# Patient Record
Sex: MALE | Race: WHITE | NOT HISPANIC OR LATINO
[De-identification: names, ages, dates, MRNs, and addresses within clinical notes are randomized per-mention and may not be internally consistent; named-entity substitution may affect disease eponyms.]

---

## 2018-01-12 ENCOUNTER — MOBILE ON CALL (OUTPATIENT)
Age: 68
End: 2018-01-12

## 2018-01-17 ENCOUNTER — APPOINTMENT (OUTPATIENT)
Dept: PULMONOLOGY | Facility: CLINIC | Age: 68
End: 2018-01-17
Payer: MEDICARE

## 2018-01-17 VITALS
WEIGHT: 135 LBS | BODY MASS INDEX: 19.99 KG/M2 | OXYGEN SATURATION: 98 % | DIASTOLIC BLOOD PRESSURE: 60 MMHG | HEART RATE: 78 BPM | SYSTOLIC BLOOD PRESSURE: 118 MMHG | HEIGHT: 69 IN

## 2018-01-17 DIAGNOSIS — G89.29 DORSALGIA, UNSPECIFIED: ICD-10-CM

## 2018-01-17 DIAGNOSIS — G47.30 SLEEP APNEA, UNSPECIFIED: ICD-10-CM

## 2018-01-17 DIAGNOSIS — M54.9 DORSALGIA, UNSPECIFIED: ICD-10-CM

## 2018-01-17 DIAGNOSIS — J32.9 CHRONIC SINUSITIS, UNSPECIFIED: ICD-10-CM

## 2018-01-17 PROCEDURE — 99202 OFFICE O/P NEW SF 15 MIN: CPT

## 2018-01-17 RX ORDER — TAMSULOSIN HYDROCHLORIDE 0.4 MG/1
0.4 CAPSULE ORAL
Refills: 0 | Status: ACTIVE | COMMUNITY

## 2018-01-17 RX ORDER — ASCORBIC ACID 500 MG
500 TABLET ORAL
Refills: 0 | Status: ACTIVE | COMMUNITY

## 2018-01-17 RX ORDER — MIRABEGRON 25 MG/1
25 TABLET, FILM COATED, EXTENDED RELEASE ORAL
Refills: 0 | Status: ACTIVE | COMMUNITY

## 2018-01-17 RX ORDER — MORPHINE SULFATE 30 MG/1
30 TABLET, FILM COATED, EXTENDED RELEASE ORAL
Refills: 0 | Status: ACTIVE | COMMUNITY

## 2018-01-17 RX ORDER — OMEPRAZOLE 20 MG/1
TABLET, DELAYED RELEASE ORAL
Refills: 0 | Status: ACTIVE | COMMUNITY

## 2018-01-17 RX ORDER — CALCITONIN SALMON 200 [IU]/1
200 SPRAY, METERED NASAL
Refills: 0 | Status: ACTIVE | COMMUNITY

## 2018-04-18 ENCOUNTER — APPOINTMENT (OUTPATIENT)
Dept: UROLOGY | Facility: CLINIC | Age: 68
End: 2018-04-18
Payer: MEDICARE

## 2018-04-18 VITALS
HEART RATE: 88 BPM | HEIGHT: 69 IN | SYSTOLIC BLOOD PRESSURE: 105 MMHG | BODY MASS INDEX: 19.26 KG/M2 | DIASTOLIC BLOOD PRESSURE: 69 MMHG | WEIGHT: 130 LBS | TEMPERATURE: 98.1 F

## 2018-04-18 PROCEDURE — 99204 OFFICE O/P NEW MOD 45 MIN: CPT

## 2018-05-04 ENCOUNTER — APPOINTMENT (OUTPATIENT)
Dept: UROLOGY | Facility: CLINIC | Age: 68
End: 2018-05-04
Payer: MEDICARE

## 2018-05-04 VITALS
HEIGHT: 69 IN | BODY MASS INDEX: 19.26 KG/M2 | DIASTOLIC BLOOD PRESSURE: 75 MMHG | WEIGHT: 130 LBS | SYSTOLIC BLOOD PRESSURE: 125 MMHG

## 2018-05-04 LAB
BILIRUB UR QL STRIP: NEGATIVE
GLUCOSE UR-MCNC: NEGATIVE
HCG UR QL: 0.2 EU/DL
HGB UR QL STRIP.AUTO: NEGATIVE
KETONES UR-MCNC: NEGATIVE
LEUKOCYTE ESTERASE UR QL STRIP: NORMAL
NITRITE UR QL STRIP: NEGATIVE
PH UR STRIP: 6
PROT UR STRIP-MCNC: NEGATIVE
SP GR UR STRIP: 1.02

## 2018-05-04 PROCEDURE — 76857 US EXAM PELVIC LIMITED: CPT

## 2018-05-04 PROCEDURE — 81003 URINALYSIS AUTO W/O SCOPE: CPT | Mod: QW

## 2018-05-04 PROCEDURE — 51741 ELECTRO-UROFLOWMETRY FIRST: CPT

## 2018-05-04 PROCEDURE — 76872 US TRANSRECTAL: CPT

## 2018-05-18 ENCOUNTER — APPOINTMENT (OUTPATIENT)
Dept: UROLOGY | Facility: CLINIC | Age: 68
End: 2018-05-18
Payer: MEDICARE

## 2018-05-18 PROCEDURE — 51784 ANAL/URINARY MUSCLE STUDY: CPT

## 2018-05-18 PROCEDURE — 51728 CYSTOMETROGRAM W/VP: CPT

## 2018-05-18 PROCEDURE — 51798 US URINE CAPACITY MEASURE: CPT | Mod: 59

## 2018-05-18 PROCEDURE — 51741 ELECTRO-UROFLOWMETRY FIRST: CPT

## 2018-05-18 PROCEDURE — 51797 INTRAABDOMINAL PRESSURE TEST: CPT

## 2018-06-15 ENCOUNTER — APPOINTMENT (OUTPATIENT)
Dept: UROLOGY | Facility: CLINIC | Age: 68
End: 2018-06-15
Payer: MEDICARE

## 2018-06-15 VITALS — DIASTOLIC BLOOD PRESSURE: 65 MMHG | SYSTOLIC BLOOD PRESSURE: 96 MMHG

## 2018-06-15 PROCEDURE — 52281 CYSTOSCOPY AND TREATMENT: CPT

## 2018-07-10 ENCOUNTER — APPOINTMENT (OUTPATIENT)
Dept: UROLOGY | Facility: CLINIC | Age: 68
End: 2018-07-10
Payer: MEDICARE

## 2018-07-10 DIAGNOSIS — N32.81 OVERACTIVE BLADDER: ICD-10-CM

## 2018-07-10 PROCEDURE — 99213 OFFICE O/P EST LOW 20 MIN: CPT

## 2018-07-17 PROBLEM — N32.81 OVERACTIVE BLADDER: Status: ACTIVE | Noted: 2018-01-17

## 2018-08-20 ENCOUNTER — MEDICATION RENEWAL (OUTPATIENT)
Age: 68
End: 2018-08-20

## 2018-08-20 DIAGNOSIS — N40.1 BENIGN PROSTATIC HYPERPLASIA WITH LOWER URINARY TRACT SYMPMS: ICD-10-CM

## 2018-08-20 DIAGNOSIS — N13.8 BENIGN PROSTATIC HYPERPLASIA WITH LOWER URINARY TRACT SYMPMS: ICD-10-CM

## 2018-08-20 RX ORDER — FESOTERODINE FUMARATE 8 MG/1
8 TABLET, FILM COATED, EXTENDED RELEASE ORAL
Qty: 7 | Refills: 0 | Status: COMPLETED | OUTPATIENT
Start: 2018-05-04 | End: 2018-08-20

## 2018-08-20 RX ORDER — SILODOSIN 8 MG/1
8 CAPSULE ORAL
Qty: 7 | Refills: 0 | Status: COMPLETED | OUTPATIENT
Start: 2018-04-18 | End: 2018-04-25

## 2018-08-20 RX ORDER — CIPROFLOXACIN HYDROCHLORIDE 500 MG/1
500 TABLET, FILM COATED ORAL
Qty: 20 | Refills: 0 | Status: COMPLETED | COMMUNITY
Start: 2018-04-18 | End: 2018-04-28

## 2018-08-20 RX ORDER — TERAZOSIN 5 MG/1
5 CAPSULE ORAL
Qty: 7 | Refills: 0 | Status: COMPLETED | OUTPATIENT
Start: 2018-04-18 | End: 2018-04-25

## 2020-02-28 ENCOUNTER — TRANSCRIPTION ENCOUNTER (OUTPATIENT)
Age: 70
End: 2020-02-28

## 2020-02-28 ENCOUNTER — INPATIENT (INPATIENT)
Facility: HOSPITAL | Age: 70
LOS: 10 days | Discharge: NURSING, REHAB OR LONG-TERM CARE | DRG: 481 | End: 2020-03-10
Attending: HOSPITALIST | Admitting: HOSPITALIST
Payer: MEDICARE

## 2020-02-28 VITALS
DIASTOLIC BLOOD PRESSURE: 67 MMHG | OXYGEN SATURATION: 94 % | RESPIRATION RATE: 17 BRPM | WEIGHT: 130.07 LBS | TEMPERATURE: 99 F | HEART RATE: 78 BPM | SYSTOLIC BLOOD PRESSURE: 131 MMHG | HEIGHT: 69 IN

## 2020-02-28 DIAGNOSIS — S72.002A FRACTURE OF UNSPECIFIED PART OF NECK OF LEFT FEMUR, INITIAL ENCOUNTER FOR CLOSED FRACTURE: ICD-10-CM

## 2020-02-28 DIAGNOSIS — Z98.890 OTHER SPECIFIED POSTPROCEDURAL STATES: Chronic | ICD-10-CM

## 2020-02-28 LAB
ALBUMIN SERPL ELPH-MCNC: 3.9 G/DL — SIGNIFICANT CHANGE UP (ref 3.3–5)
ALP SERPL-CCNC: 136 U/L — HIGH (ref 40–120)
ALT FLD-CCNC: 22 U/L — SIGNIFICANT CHANGE UP (ref 10–45)
ANION GAP SERPL CALC-SCNC: 2 MMOL/L — LOW (ref 5–17)
APTT BLD: 32.1 SEC — SIGNIFICANT CHANGE UP (ref 27.5–36.3)
AST SERPL-CCNC: 16 U/L — SIGNIFICANT CHANGE UP (ref 10–40)
BASOPHILS # BLD AUTO: 0.03 K/UL — SIGNIFICANT CHANGE UP (ref 0–0.2)
BASOPHILS NFR BLD AUTO: 0.3 % — SIGNIFICANT CHANGE UP (ref 0–2)
BILIRUB SERPL-MCNC: 0.5 MG/DL — SIGNIFICANT CHANGE UP (ref 0.2–1.2)
BLD GP AB SCN SERPL QL: SIGNIFICANT CHANGE UP
BUN SERPL-MCNC: 15 MG/DL — SIGNIFICANT CHANGE UP (ref 7–23)
CALCIUM SERPL-MCNC: 9.7 MG/DL — SIGNIFICANT CHANGE UP (ref 8.4–10.5)
CHLORIDE SERPL-SCNC: 104 MMOL/L — SIGNIFICANT CHANGE UP (ref 96–108)
CO2 SERPL-SCNC: 31 MMOL/L — SIGNIFICANT CHANGE UP (ref 22–31)
CREAT SERPL-MCNC: 0.64 MG/DL — SIGNIFICANT CHANGE UP (ref 0.5–1.3)
EOSINOPHIL # BLD AUTO: 0.04 K/UL — SIGNIFICANT CHANGE UP (ref 0–0.5)
EOSINOPHIL NFR BLD AUTO: 0.3 % — SIGNIFICANT CHANGE UP (ref 0–6)
GLUCOSE SERPL-MCNC: 118 MG/DL — HIGH (ref 70–99)
HCT VFR BLD CALC: 41.2 % — SIGNIFICANT CHANGE UP (ref 39–50)
HGB BLD-MCNC: 13.5 G/DL — SIGNIFICANT CHANGE UP (ref 13–17)
IMM GRANULOCYTES NFR BLD AUTO: 0.3 % — SIGNIFICANT CHANGE UP (ref 0–1.5)
INR BLD: 1.27 RATIO — HIGH (ref 0.88–1.16)
LYMPHOCYTES # BLD AUTO: 1.02 K/UL — SIGNIFICANT CHANGE UP (ref 1–3.3)
LYMPHOCYTES # BLD AUTO: 8.6 % — LOW (ref 13–44)
MCHC RBC-ENTMCNC: 31.9 PG — SIGNIFICANT CHANGE UP (ref 27–34)
MCHC RBC-ENTMCNC: 32.8 GM/DL — SIGNIFICANT CHANGE UP (ref 32–36)
MCV RBC AUTO: 97.4 FL — SIGNIFICANT CHANGE UP (ref 80–100)
MONOCYTES # BLD AUTO: 1.09 K/UL — HIGH (ref 0–0.9)
MONOCYTES NFR BLD AUTO: 9.2 % — SIGNIFICANT CHANGE UP (ref 2–14)
NEUTROPHILS # BLD AUTO: 9.69 K/UL — HIGH (ref 1.8–7.4)
NEUTROPHILS NFR BLD AUTO: 81.3 % — HIGH (ref 43–77)
NRBC # BLD: 0 /100 WBCS — SIGNIFICANT CHANGE UP (ref 0–0)
PLATELET # BLD AUTO: 220 K/UL — SIGNIFICANT CHANGE UP (ref 150–400)
POTASSIUM SERPL-MCNC: 4.4 MMOL/L — SIGNIFICANT CHANGE UP (ref 3.5–5.3)
POTASSIUM SERPL-SCNC: 4.4 MMOL/L — SIGNIFICANT CHANGE UP (ref 3.5–5.3)
PROT SERPL-MCNC: 8.2 G/DL — SIGNIFICANT CHANGE UP (ref 6–8.3)
PROTHROM AB SERPL-ACNC: 14.3 SEC — HIGH (ref 10–12.9)
RBC # BLD: 4.23 M/UL — SIGNIFICANT CHANGE UP (ref 4.2–5.8)
RBC # FLD: 13.3 % — SIGNIFICANT CHANGE UP (ref 10.3–14.5)
SODIUM SERPL-SCNC: 137 MMOL/L — SIGNIFICANT CHANGE UP (ref 135–145)
WBC # BLD: 11.91 K/UL — HIGH (ref 3.8–10.5)
WBC # FLD AUTO: 11.91 K/UL — HIGH (ref 3.8–10.5)

## 2020-02-28 PROCEDURE — 73502 X-RAY EXAM HIP UNI 2-3 VIEWS: CPT | Mod: 26,LT

## 2020-02-28 PROCEDURE — 93010 ELECTROCARDIOGRAM REPORT: CPT

## 2020-02-28 PROCEDURE — 99285 EMERGENCY DEPT VISIT HI MDM: CPT

## 2020-02-28 PROCEDURE — 71045 X-RAY EXAM CHEST 1 VIEW: CPT | Mod: 26

## 2020-02-28 RX ORDER — FINASTERIDE 5 MG/1
1 TABLET, FILM COATED ORAL
Qty: 0 | Refills: 0 | DISCHARGE

## 2020-02-28 RX ORDER — LATANOPROST 0.05 MG/ML
1 SOLUTION/ DROPS OPHTHALMIC; TOPICAL
Qty: 0 | Refills: 0 | DISCHARGE

## 2020-02-28 RX ORDER — TAMSULOSIN HYDROCHLORIDE 0.4 MG/1
0.8 CAPSULE ORAL AT BEDTIME
Refills: 0 | Status: DISCONTINUED | OUTPATIENT
Start: 2020-02-28 | End: 2020-02-29

## 2020-02-28 RX ORDER — MORPHINE SULFATE 50 MG/1
100 CAPSULE, EXTENDED RELEASE ORAL
Qty: 0 | Refills: 0 | DISCHARGE

## 2020-02-28 RX ORDER — ALENDRONATE SODIUM 70 MG/1
1 TABLET ORAL
Qty: 0 | Refills: 0 | DISCHARGE

## 2020-02-28 RX ORDER — LIDOCAINE 4 G/100G
0 CREAM TOPICAL
Qty: 0 | Refills: 0 | DISCHARGE

## 2020-02-28 RX ORDER — RANITIDINE HYDROCHLORIDE 150 MG/1
1 TABLET, FILM COATED ORAL
Qty: 0 | Refills: 0 | DISCHARGE

## 2020-02-28 RX ORDER — LANOLIN ALCOHOL/MO/W.PET/CERES
2 CREAM (GRAM) TOPICAL
Qty: 0 | Refills: 0 | DISCHARGE

## 2020-02-28 RX ORDER — CHLORHEXIDINE GLUCONATE 213 G/1000ML
1 SOLUTION TOPICAL ONCE
Refills: 0 | Status: COMPLETED | OUTPATIENT
Start: 2020-02-29 | End: 2020-02-29

## 2020-02-28 RX ORDER — MIRABEGRON 50 MG/1
1 TABLET, EXTENDED RELEASE ORAL
Qty: 0 | Refills: 0 | DISCHARGE

## 2020-02-28 RX ORDER — MORPHINE SULFATE 50 MG/1
100 CAPSULE, EXTENDED RELEASE ORAL AT BEDTIME
Refills: 0 | Status: DISCONTINUED | OUTPATIENT
Start: 2020-02-28 | End: 2020-02-29

## 2020-02-28 RX ORDER — MORPHINE SULFATE 50 MG/1
0 CAPSULE, EXTENDED RELEASE ORAL
Qty: 0 | Refills: 0 | DISCHARGE

## 2020-02-28 RX ORDER — FINASTERIDE 5 MG/1
5 TABLET, FILM COATED ORAL DAILY
Refills: 0 | Status: DISCONTINUED | OUTPATIENT
Start: 2020-02-28 | End: 2020-02-29

## 2020-02-28 RX ORDER — SODIUM CHLORIDE 0.65 %
2 AEROSOL, SPRAY (ML) NASAL
Qty: 0 | Refills: 0 | DISCHARGE

## 2020-02-28 RX ORDER — ASCORBIC ACID 60 MG
500 TABLET,CHEWABLE ORAL DAILY
Refills: 0 | Status: DISCONTINUED | OUTPATIENT
Start: 2020-02-28 | End: 2020-02-29

## 2020-02-28 RX ORDER — LACTULOSE 10 G/15ML
10 SOLUTION ORAL DAILY
Refills: 0 | Status: DISCONTINUED | OUTPATIENT
Start: 2020-02-28 | End: 2020-02-29

## 2020-02-28 RX ORDER — ERGOCALCIFEROL 1.25 MG/1
2 CAPSULE ORAL
Qty: 0 | Refills: 0 | DISCHARGE

## 2020-02-28 RX ORDER — MORPHINE SULFATE 50 MG/1
200 CAPSULE, EXTENDED RELEASE ORAL DAILY
Refills: 0 | Status: DISCONTINUED | OUTPATIENT
Start: 2020-02-28 | End: 2020-02-29

## 2020-02-28 RX ORDER — ALBUTEROL 90 UG/1
2 AEROSOL, METERED ORAL EVERY 6 HOURS
Refills: 0 | Status: DISCONTINUED | OUTPATIENT
Start: 2020-02-28 | End: 2020-02-29

## 2020-02-28 RX ORDER — TAMSULOSIN HYDROCHLORIDE 0.4 MG/1
2 CAPSULE ORAL
Qty: 0 | Refills: 0 | DISCHARGE

## 2020-02-28 RX ORDER — LIDOCAINE 4 G/100G
1 CREAM TOPICAL EVERY 24 HOURS
Refills: 0 | Status: DISCONTINUED | OUTPATIENT
Start: 2020-02-28 | End: 2020-02-29

## 2020-02-28 RX ORDER — ENOXAPARIN SODIUM 100 MG/ML
40 INJECTION SUBCUTANEOUS DAILY
Refills: 0 | Status: DISCONTINUED | OUTPATIENT
Start: 2020-02-28 | End: 2020-02-29

## 2020-02-28 RX ORDER — ACETAMINOPHEN 500 MG
650 TABLET ORAL EVERY 6 HOURS
Refills: 0 | Status: DISCONTINUED | OUTPATIENT
Start: 2020-02-28 | End: 2020-02-28

## 2020-02-28 RX ORDER — ACETAMINOPHEN 500 MG
650 TABLET ORAL
Qty: 0 | Refills: 0 | DISCHARGE

## 2020-02-28 RX ORDER — ALBUTEROL 90 UG/1
2 AEROSOL, METERED ORAL
Qty: 0 | Refills: 0 | DISCHARGE

## 2020-02-28 RX ORDER — LACTULOSE 10 G/15ML
0 SOLUTION ORAL
Qty: 0 | Refills: 0 | DISCHARGE

## 2020-02-28 RX ORDER — LANOLIN ALCOHOL/MO/W.PET/CERES
3 CREAM (GRAM) TOPICAL AT BEDTIME
Refills: 0 | Status: DISCONTINUED | OUTPATIENT
Start: 2020-02-28 | End: 2020-02-29

## 2020-02-28 RX ORDER — MORPHINE SULFATE 50 MG/1
200 CAPSULE, EXTENDED RELEASE ORAL
Qty: 0 | Refills: 0 | DISCHARGE

## 2020-02-28 RX ORDER — ACETAMINOPHEN 500 MG
2 TABLET ORAL
Qty: 0 | Refills: 0 | DISCHARGE

## 2020-02-28 RX ORDER — LORATADINE 10 MG/1
1 TABLET ORAL
Qty: 0 | Refills: 0 | DISCHARGE

## 2020-02-28 RX ORDER — ASCORBIC ACID 60 MG
0 TABLET,CHEWABLE ORAL
Qty: 0 | Refills: 0 | DISCHARGE

## 2020-02-28 RX ORDER — ACETAMINOPHEN 500 MG
650 TABLET ORAL EVERY 6 HOURS
Refills: 0 | Status: DISCONTINUED | OUTPATIENT
Start: 2020-02-28 | End: 2020-02-29

## 2020-02-28 RX ORDER — LATANOPROST 0.05 MG/ML
1 SOLUTION/ DROPS OPHTHALMIC; TOPICAL AT BEDTIME
Refills: 0 | Status: DISCONTINUED | OUTPATIENT
Start: 2020-02-28 | End: 2020-02-29

## 2020-02-28 RX ORDER — MORPHINE SULFATE 50 MG/1
2 CAPSULE, EXTENDED RELEASE ORAL EVERY 4 HOURS
Refills: 0 | Status: DISCONTINUED | OUTPATIENT
Start: 2020-02-28 | End: 2020-02-29

## 2020-02-28 RX ADMIN — Medication 3 MILLIGRAM(S): at 21:18

## 2020-02-28 RX ADMIN — LATANOPROST 1 DROP(S): 0.05 SOLUTION/ DROPS OPHTHALMIC; TOPICAL at 21:22

## 2020-02-28 RX ADMIN — MORPHINE SULFATE 100 MILLIGRAM(S): 50 CAPSULE, EXTENDED RELEASE ORAL at 21:20

## 2020-02-28 RX ADMIN — Medication 650 MILLIGRAM(S): at 20:32

## 2020-02-28 RX ADMIN — TAMSULOSIN HYDROCHLORIDE 0.8 MILLIGRAM(S): 0.4 CAPSULE ORAL at 21:19

## 2020-02-28 RX ADMIN — Medication 400 MILLIGRAM(S): at 21:19

## 2020-02-28 NOTE — H&P ADULT - NSHPREVIEWOFSYSTEMS_GEN_ALL_CORE
REVIEW OF SYSTEMS:  CONSTITUTIONAL: No fever, weight loss, or fatigue  EYES: No eye pain, visual disturbances, or discharge  ENMT:  No difficulty hearing, tinnitus, vertigo; No sinus or throat pain  NECK: No neck pain or neck stiffness  RESPIRATORY: No cough, wheezing, chills or hemoptysis; No shortness of breath  CARDIOVASCULAR: No chest pain, palpitations, dizziness, or leg swelling  GASTROINTESTINAL: No abdominal pain, No nausea, vomiting, or hematemesis; No diarrhea or constipation  GENITOURINARY: No dysuria, frequency, hematuria, or incontinence  NEUROLOGICAL: +diminished sensation to right leg, chronic. No headaches, memory loss, loss of strength, or tremors  SKIN: No itching, burning, rashes, or lesions   MUSCULOSKELETAL: +Left hip pain, + low back pain.   PSYCHIATRIC: No depression, anxiety, mood swings, or difficulty sleeping    All other ROS reviewed and negative except as otherwise stated

## 2020-02-28 NOTE — ED PROVIDER NOTE - OBJECTIVE STATEMENT
69 yr old male with hx of osteoarthritis, former smoker and  ETOH abuse, chronic back pain on morphine presents with left hip pain s/p trip and fall yesterday. Pt denies hitting head or LOC. Pt reports taking morphine po prior to arrival. Unable to ambulate due to pain.

## 2020-02-28 NOTE — ED PROVIDER NOTE - CLINICAL SUMMARY MEDICAL DECISION MAKING FREE TEXT BOX
69 yr old male with hx of osteoarthritis, former smoker and  ETOH abuse, chronic back pain on morphine presents with left hip pain s/p trip and fall yesterday. Pt denies hitting head or LOC. Pt reports taking morphine po prior to arrival. Unable to ambulate due to pain.  left leg external rotated and shortening, tenderness 69 yr old male with hx of osteoarthritis, former smoker and  ETOH abuse, chronic back pain on morphine presents with left hip pain s/p trip and fall yesterday. Pt denies hitting head or LOC. Pt reports taking morphine po prior to arrival. Unable to ambulate due to pain.  left leg external rotated and shortening, tenderness   xray showing left hip fracture, ortho called for consult, admit to medicine.

## 2020-02-28 NOTE — H&P ADULT - ATTENDING COMMENTS
I have personally seen and examined patient on the above date.  I discussed the case with Lian Devine NP and I agree with findings and plan as detailed per note above, which I have amended where appropriate.      Acute intertrochanteric hip fracture  - needs pulmonary clearance given COPD  - NPO after midnight

## 2020-02-28 NOTE — H&P ADULT - ASSESSMENT
70 yo male with PMH osteoarthritis, osteoporosis, BPH, glaucoma, seizure disorder, former smoker/ETOH use, chronic back pain on PO morphine presents to hospital with left intertrochanteric hip fx.    #Left intertrochanteric hip fx  - Admit to medicine  - Ortho consult pending  - Likely OR in AM  - NPO after midnight  - Pain control - on PO morphine daily for chronic pain. Will continue PO morphine standing medications with additional coverage for breakthrough pain    #COPD  - Not formally diagnosed with COPD but apparent on xray, physical exam  - nebs PRN  - Will get pulmonary consult    #BPH  - Continue home meds finasteride, tamsulosin  - Also takes mirabegron 50mg daily for overactive bladder -- this med is not in [facility] formulary     #Osteoporosis  - Takes alendronate 70mg tab once per week - will hold for now      #Chronic pain  - Takes PO morphine sulfate ER 200mg in morning, morphine sulfate IR 60mg in afternoon, and morphine sulfate ER 100mg at night   - Will keep standing morning and evening doses  - Confirmed ISTOPP Reference #: [id number]   Patient Name: Cassius Helton   YOB: 1950   Address:  [place] [address]   Sex: Male   Rx Written	Rx Dispensed	Drug	Quantity	Days Supply	Prescriber Name	Payment Method	Dispenser	  	morphine sulfate ir 30 mg tab 	60	30	[facility]	  	morphine sulf er 100 mg tablet 	30	30	[facility]	  	morphine sulf er 200 mg tablet 	30	30	[facility]	    #DVT ppx  - lovenox     CAPRINI SCORE [CLOT]    AGE RELATED RISK FACTORS                                                           MOBILITY RELATED FACTORS  [ ] Age 41-60 years                                            (1 Point)                 [ x] Bed rest                                                        (1 Point)  [x ] Age: 61-74 years                                           (2 Points)               [ ] Plaster cast                                                   (2 Points)  [ ] Age= 75 years                                                (3 Points)                 [ ] Bed bound for more than 72 hours          (2 Points)    DISEASE RELATED RISK FACTORS                                                     GENDER SPECIFIC FACTORS  [ ] Edema in the lower extremities                 (1 Point)                   [ ] Pregnancy                                                         (1 Point)  [ ] Varicose veins                                               (1 Point)                    [ ] Post-partum < 6 weeks                                   (1 Point)             [ ] BMI > 25 Kg/m2                                            (1 Point)                    [ ] Hormonal therapy  or oral contraception   (1 Point)                 [ ] Sepsis (in the previous month)                   (1 Point)                   [ ] History of pregnancy complications              (1 point)  [ ] Pneumonia or serious lung disease                                              [ ] Unexplained or recurrent                (1 Point)           (in the previous month)                               (1 Point)  [ ] Abnormal pulmonary function test                     (1 Point)            SURGERY RELATED RISK FACTORS  [ ] Acute myocardial infarction                              (1 Point)                [ ]  Section                                             (1 Point)  [ ] Congestive heart failure (in the previous month)  (1 Point)       [ ] Minor surgery                                                  (1 Point)   [ ] Inflammatory bowel disease                             (1 Point)                [ ] Arthroscopic surgery                                        (2 Points)  [ ] Central venous access                                      (2 Points)                [ ] General surgery lasting more than 45 minutes   (2 Points)       [ ] Stroke (in the previous month)                          (5 Points)             [ ] Elective arthroplasty                                         (5 Points)                                                                                                                                               HEMATOLOGY RELATED FACTORS                                                       TRAUMA RELATED RISK FACTORS  [ ] Prior episodes of VTE                                     (3 Points)                   [x ] Fracture of the hip, pelvis, or leg                       (5 Points)  [ ] Positive family history for VTE                         (3 Points)                [ ] Acute spinal cord injury (in the previous month)  (5 Points)  [ ] Prothrombin 53280 A                                     (3 Points)                   [ ] Paralysis  (less than 1 month)                             (5 Points)  [ ] Factor V Leiden                                             (3 Points)                      [ ] Multiple Trauma within 1 month                        (5 Points)  [ ] Lupus anticoagulants                                     (3 Points)                                                           [ ] Anticardiolipin antibodies                               (3 Points)                                                       [ ] High homocysteine in the blood                      (3 Points)                                             [ ] Other congenital or acquired thrombophilia      (3 Points)                                                [ ] Heparin induced thrombocytopenia                  (3 Points)                                        Total Score [    8      ] 68 yo male with PMH osteoarthritis, osteoporosis, BPH, glaucoma, seizure disorder, former smoker/ETOH use, chronic back pain on PO morphine presents to hospital with left intertrochanteric hip fx.    #Left intertrochanteric hip fx  - Admit to medicine  - Ortho consult pending  - Likely OR in AM  - NPO after midnight  - Pain control - on PO morphine daily for chronic pain. Will continue PO morphine standing medications with additional coverage for breakthrough pain    #COPD  - Not formally diagnosed with COPD but apparent on xray, physical exam  - nebs PRN  - Will get pulmonary consult    #BPH  - Continue home meds finasteride, tamsulosin  - Also takes mirabegron 50mg daily for overactive bladder -- this med is not in [facility] formulary     #Osteoporosis  - Takes alendronate 70mg tab once per week - will hold for now     #Glaucoma  - eye drops     #Chronic pain  - Takes PO morphine sulfate ER 200mg in morning, morphine sulfate IR 60mg in afternoon, and morphine sulfate ER 100mg at night   - Will keep standing morning and evening doses  - Confirmed ISTOPP Reference #: [id number]   Patient Name: Cassius Helton   YOB: 1950   Address:  [place] [address]   Sex: Male   Rx Written	Rx Dispensed	Drug	Quantity	Days Supply	Prescriber Name	Payment Method	Dispenser	  	morphine sulfate ir 30 mg tab 	60	30	[facility]	  	morphine sulf er 100 mg tablet 	30	30	[facility]	  	morphine sulf er 200 mg tablet 	30	30	[facility]	    #DVT ppx  - lovenox     CAPRINI SCORE [CLOT]    AGE RELATED RISK FACTORS                                                           MOBILITY RELATED FACTORS  [ ] Age 41-60 years                                            (1 Point)                 [ x] Bed rest                                                        (1 Point)  [x ] Age: 61-74 years                                           (2 Points)               [ ] Plaster cast                                                   (2 Points)  [ ] Age= 75 years                                                (3 Points)                 [ ] Bed bound for more than 72 hours          (2 Points)    DISEASE RELATED RISK FACTORS                                                     GENDER SPECIFIC FACTORS  [ ] Edema in the lower extremities                 (1 Point)                   [ ] Pregnancy                                                         (1 Point)  [ ] Varicose veins                                               (1 Point)                    [ ] Post-partum < 6 weeks                                   (1 Point)             [ ] BMI > 25 Kg/m2                                            (1 Point)                    [ ] Hormonal therapy  or oral contraception   (1 Point)                 [ ] Sepsis (in the previous month)                   (1 Point)                   [ ] History of pregnancy complications              (1 point)  [ ] Pneumonia or serious lung disease                                              [ ] Unexplained or recurrent                (1 Point)           (in the previous month)                               (1 Point)  [ ] Abnormal pulmonary function test                     (1 Point)            SURGERY RELATED RISK FACTORS  [ ] Acute myocardial infarction                              (1 Point)                [ ]  Section                                             (1 Point)  [ ] Congestive heart failure (in the previous month)  (1 Point)       [ ] Minor surgery                                                  (1 Point)   [ ] Inflammatory bowel disease                             (1 Point)                [ ] Arthroscopic surgery                                        (2 Points)  [ ] Central venous access                                      (2 Points)                [ ] General surgery lasting more than 45 minutes   (2 Points)       [ ] Stroke (in the previous month)                          (5 Points)             [ ] Elective arthroplasty                                         (5 Points)                                                                                                                                               HEMATOLOGY RELATED FACTORS                                                       TRAUMA RELATED RISK FACTORS  [ ] Prior episodes of VTE                                     (3 Points)                   [x ] Fracture of the hip, pelvis, or leg                       (5 Points)  [ ] Positive family history for VTE                         (3 Points)                [ ] Acute spinal cord injury (in the previous month)  (5 Points)  [ ] Prothrombin 38023 A                                     (3 Points)                   [ ] Paralysis  (less than 1 month)                             (5 Points)  [ ] Factor V Leiden                                             (3 Points)                      [ ] Multiple Trauma within 1 month                        (5 Points)  [ ] Lupus anticoagulants                                     (3 Points)                                                           [ ] Anticardiolipin antibodies                               (3 Points)                                                       [ ] High homocysteine in the blood                      (3 Points)                                             [ ] Other congenital or acquired thrombophilia      (3 Points)                                                [ ] Heparin induced thrombocytopenia                  (3 Points)                                        Total Score [    8      ] 68 yo male with PMH osteoarthritis, osteoporosis, BPH, glaucoma, seizure disorder, former smoker/ETOH use, chronic back pain on PO morphine presents to hospital with left intertrochanteric hip fx.    #Left intertrochanteric hip fx  - Admit to medicine  - Ortho consult pending  - Likely OR in AM  - NPO after midnight  - Pain control - on PO morphine daily for chronic pain. Will continue PO morphine standing medications with additional coverage for breakthrough pain    #COPD  - Not formally diagnosed with COPD but apparent on xray, physical exam  - inhaler PRN  - Will get pulmonary consult/clearance for surgery    #BPH  - Continue home meds finasteride, tamsulosin  - Also takes mirabegron 50mg daily for overactive bladder -- this med is not in [facility] formulary     #Osteoporosis  - Takes alendronate 70mg tab once per week - will hold for now     #Glaucoma  - eye drops     #Chronic pain  - Takes PO morphine sulfate ER 200mg in morning, morphine sulfate IR 60mg in afternoon, and morphine sulfate ER 100mg at night   - Will keep standing morning and evening doses  - Confirmed ISTOPP Reference #: [id number]   Patient Name: Cassius Helton   YOB: 1950   Address:  [place] [address]   Sex: Male   Rx Written	Rx Dispensed	Drug	Quantity	Days Supply	Prescriber Name	Payment Method	Dispenser	  	morphine sulfate ir 30 mg tab 	60	30	[facility]	  	morphine sulf er 100 mg tablet 	30	30	[facility]	  	morphine sulf er 200 mg tablet 	30	30	[facility]	    #DVT ppx  - lovenox     CAPRINI SCORE [CLOT]    AGE RELATED RISK FACTORS                                                           MOBILITY RELATED FACTORS  [ ] Age 41-60 years                                            (1 Point)                 [ x] Bed rest                                                        (1 Point)  [x ] Age: 61-74 years                                           (2 Points)               [ ] Plaster cast                                                   (2 Points)  [ ] Age= 75 years                                                (3 Points)                 [ ] Bed bound for more than 72 hours          (2 Points)    DISEASE RELATED RISK FACTORS                                                     GENDER SPECIFIC FACTORS  [ ] Edema in the lower extremities                 (1 Point)                   [ ] Pregnancy                                                         (1 Point)  [ ] Varicose veins                                               (1 Point)                    [ ] Post-partum < 6 weeks                                   (1 Point)             [ ] BMI > 25 Kg/m2                                            (1 Point)                    [ ] Hormonal therapy  or oral contraception   (1 Point)                 [ ] Sepsis (in the previous month)                   (1 Point)                   [ ] History of pregnancy complications              (1 point)  [ ] Pneumonia or serious lung disease                                              [ ] Unexplained or recurrent                (1 Point)           (in the previous month)                               (1 Point)  [ ] Abnormal pulmonary function test                     (1 Point)            SURGERY RELATED RISK FACTORS  [ ] Acute myocardial infarction                              (1 Point)                [ ]  Section                                             (1 Point)  [ ] Congestive heart failure (in the previous month)  (1 Point)       [ ] Minor surgery                                                  (1 Point)   [ ] Inflammatory bowel disease                             (1 Point)                [ ] Arthroscopic surgery                                        (2 Points)  [ ] Central venous access                                      (2 Points)                [ ] General surgery lasting more than 45 minutes   (2 Points)       [ ] Stroke (in the previous month)                          (5 Points)             [ ] Elective arthroplasty                                         (5 Points)                                                                                                                                               HEMATOLOGY RELATED FACTORS                                                       TRAUMA RELATED RISK FACTORS  [ ] Prior episodes of VTE                                     (3 Points)                   [x ] Fracture of the hip, pelvis, or leg                       (5 Points)  [ ] Positive family history for VTE                         (3 Points)                [ ] Acute spinal cord injury (in the previous month)  (5 Points)  [ ] Prothrombin 95896 A                                     (3 Points)                   [ ] Paralysis  (less than 1 month)                             (5 Points)  [ ] Factor V Leiden                                             (3 Points)                      [ ] Multiple Trauma within 1 month                        (5 Points)  [ ] Lupus anticoagulants                                     (3 Points)                                                           [ ] Anticardiolipin antibodies                               (3 Points)                                                       [ ] High homocysteine in the blood                      (3 Points)                                             [ ] Other congenital or acquired thrombophilia      (3 Points)                                                [ ] Heparin induced thrombocytopenia                  (3 Points)                                        Total Score [    8      ] 70 yo male with PMH osteoarthritis, osteoporosis, BPH, glaucoma, seizure disorder, former smoker/ETOH use, chronic back pain on PO morphine presents to hospital with left intertrochanteric hip fx.    Left intertrochanteric hip fx  - Admit to medicine  - Ortho consult pending  - Likely OR in AM  - NPO after midnight  - Pain control - on PO morphine daily for chronic pain. Will continue PO morphine standing medications with additional coverage for breakthrough pain    #COPD  - Not formally diagnosed with COPD but apparent on xray, physical exam  - inhaler PRN  - Will get pulmonary consult/clearance for surgery    #BPH  - Continue home meds finasteride, tamsulosin  - Also takes mirabegron 50mg daily for overactive bladder -- this med is not in [facility] formulary     #Osteoporosis  - Takes alendronate 70mg tab once per week - will hold for now     #Glaucoma  - eye drops     #Chronic pain  - Takes PO morphine sulfate ER 200mg in morning, morphine sulfate IR 60mg in afternoon, and morphine sulfate ER 100mg at night   - Will keep standing morning and evening doses  - Confirmed ISTOPP Reference #: [id number]   Patient Name: Cassius Helton   YOB: 1950   Address:  [place] [address]   Sex: Male   Rx Written	Rx Dispensed	Drug	Quantity	Days Supply	Prescriber Name	Payment Method	Dispenser	  	morphine sulfate ir 30 mg tab 	60	30	[facility]	  	morphine sulf er 100 mg tablet 	30	30	[facility]	  	morphine sulf er 200 mg tablet 	30	30	[facility]	    #DVT ppx  - lovenox     CAPRINI SCORE [CLOT]    AGE RELATED RISK FACTORS                                                           MOBILITY RELATED FACTORS  [ ] Age 41-60 years                                            (1 Point)                 [ x] Bed rest                                                        (1 Point)  [x ] Age: 61-74 years                                           (2 Points)               [ ] Plaster cast                                                   (2 Points)  [ ] Age= 75 years                                                (3 Points)                 [ ] Bed bound for more than 72 hours          (2 Points)    DISEASE RELATED RISK FACTORS                                                     GENDER SPECIFIC FACTORS  [ ] Edema in the lower extremities                 (1 Point)                   [ ] Pregnancy                                                         (1 Point)  [ ] Varicose veins                                               (1 Point)                    [ ] Post-partum < 6 weeks                                   (1 Point)             [ ] BMI > 25 Kg/m2                                            (1 Point)                    [ ] Hormonal therapy  or oral contraception   (1 Point)                 [ ] Sepsis (in the previous month)                   (1 Point)                   [ ] History of pregnancy complications              (1 point)  [ ] Pneumonia or serious lung disease                                              [ ] Unexplained or recurrent                (1 Point)           (in the previous month)                               (1 Point)  [ ] Abnormal pulmonary function test                     (1 Point)            SURGERY RELATED RISK FACTORS  [ ] Acute myocardial infarction                              (1 Point)                [ ]  Section                                             (1 Point)  [ ] Congestive heart failure (in the previous month)  (1 Point)       [ ] Minor surgery                                                  (1 Point)   [ ] Inflammatory bowel disease                             (1 Point)                [ ] Arthroscopic surgery                                        (2 Points)  [ ] Central venous access                                      (2 Points)                [ ] General surgery lasting more than 45 minutes   (2 Points)       [ ] Stroke (in the previous month)                          (5 Points)             [ ] Elective arthroplasty                                         (5 Points)                                                                                                                                               HEMATOLOGY RELATED FACTORS                                                       TRAUMA RELATED RISK FACTORS  [ ] Prior episodes of VTE                                     (3 Points)                   [x ] Fracture of the hip, pelvis, or leg                       (5 Points)  [ ] Positive family history for VTE                         (3 Points)                [ ] Acute spinal cord injury (in the previous month)  (5 Points)  [ ] Prothrombin 19274 A                                     (3 Points)                   [ ] Paralysis  (less than 1 month)                             (5 Points)  [ ] Factor V Leiden                                             (3 Points)                      [ ] Multiple Trauma within 1 month                        (5 Points)  [ ] Lupus anticoagulants                                     (3 Points)                                                           [ ] Anticardiolipin antibodies                               (3 Points)                                                       [ ] High homocysteine in the blood                      (3 Points)                                             [ ] Other congenital or acquired thrombophilia      (3 Points)                                                [ ] Heparin induced thrombocytopenia                  (3 Points)                                        Total Score [    8      ]

## 2020-02-28 NOTE — ED ADULT NURSE NOTE - OBJECTIVE STATEMENT
Patient presents to ED c/o left hip pain. Patient states he had a trip and fall yesterday at around 1130am, patient states he fell on his left side after a trip and fall while walking into the house, denies head injury, denies LOC. Patient states pain is minimal at rest and worsened with movement.

## 2020-02-28 NOTE — ED PROVIDER NOTE - ATTENDING CONTRIBUTION TO CARE
Shakira with [employer]. 69 yr old male with hx of osteoarthritis, former smoker and  ETOH abuse, chronic back pain on morphine presents with left hip pain s/p trip and fall yesterday. Pt denies hitting head or LOC. Pt reports taking morphine po prior to arrival. Unable to ambulate due to pain.  left leg external rotated and shortening, tenderness   xray showing left hip fracture, ortho called for consult, admit to medicine.  I performed a face to face bedside interview with patient regarding history of present illness, review of symptoms and past medical history. I completed an independent physical exam.  I have discussed the patient's plan of care with Physician Assistant (PA). I agree with note as stated above, having amended the EMR as needed to reflect my findings.   This includes History of Present Illness, HIV, Past Medical/Surgical/Family/Social History, Allergies and Home Medications, Review of Systems, Physical Exam, and any Progress Notes during the time I functioned as the attending physician for this patient.

## 2020-02-28 NOTE — ED ADULT NURSE REASSESSMENT NOTE - REASSESS COMMUNICATION
patient was given a tele bed, per supervisor patient will go to [facility] (as ordered by MD) instead, awaiting bed assignment now.

## 2020-02-28 NOTE — H&P ADULT - NSICDXPASTMEDICALHX_GEN_ALL_CORE_FT
PAST MEDICAL HISTORY:  Back pain     BPH (benign prostatic hyperplasia)     Osteoarthritis     Osteoporosis

## 2020-02-28 NOTE — H&P ADULT - HISTORY OF PRESENT ILLNESS
70 yo male with PMH osteoarthritis, osteoporosis, glaucoma, BPH, seizure disorder, former smoker/ETOH use, chronic back pain on PO morphine presents to hospital with left hip pain. Patient tripped over one step yesterday and fell, immediately felt pain and was unable to ambulate, he dragged himself to couch and slept on couch, this morning when he was still unable to ambulate due to pain he decided to call ambulance. Dx with left intertrochanteric fx.

## 2020-02-28 NOTE — H&P ADULT - NSHPLABSRESULTS_GEN_ALL_CORE
< from: Xray Hip w/ Pelvis 2 or 3 Views, Left (02.28.20 @ 16:18) >    EXAM:  XR HIP WITH PELV 2-3V LT      PROCEDURE DATE:  02/28/2020      INTERPRETATION:  Left hip with full pelvic view. 4 views. Patient local trauma from a fall.    Hips are relatively free of degeneration.    Degenerative loss of disc height mainly on the right at L4-5 is noted.    There is an intertrochanteric fracture of the left hip in fairly good position at this time.    There is some deformity of the medial left pubic ramus suggesting old fracture.    IMPRESSION: Probable old small fracture on the medial left pubic ramus.    There is a new intertrochanteric fracture left hip. A note was posted on the [facility] ED discrepancy site at 4:34 PM.    < end of copied text >        < from: Xray Chest 1 View AP/PA (02.28.20 @ 16:41) >      EXAM:  XR CHEST AP OR PA 1V      PROCEDURE DATE:  02/28/2020        INTERPRETATION:  AP erect chest on February 28, 2020 at 4:36 PM. 2 views obtained. Patient is admitted with a left hip intertrochanteric fracture.    COMPARISON: None available.    COPD hyperexpansion of the lungs is suggested.    Heart is normal in size.    No lung consolidations or layering effusions are evident.    IMPRESSION: COPD.    < end of copied text >                          13.5   11.91 )-----------( 220      ( 28 Feb 2020 15:55 )             41.2       02-28    137  |  104  |  15  ----------------------------<  118<H>  4.4   |  31  |  0.64    Ca    9.7      28 Feb 2020 15:55    TPro  8.2  /  Alb  3.9  /  TBili  0.5  /  DBili  x   /  AST  16  /  ALT  22  /  AlkPhos  136<H>  02-28      PT/INR - ( 28 Feb 2020 15:55 )   PT: 14.3 sec;   INR: 1.27 ratio         PTT - ( 28 Feb 2020 15:55 )  PTT:32.1 sec

## 2020-02-28 NOTE — H&P ADULT - NSHPSOCIALHISTORY_GEN_ALL_CORE
Patient lives in basement of mother's house (10 steps to basement)    Usually ambulates using a cane    Former smoker, former ETOH use (quit 10 years ago)    Denies recreational drug use    Unknown FH in father, FH of glaucoma in mother

## 2020-02-29 LAB
ANION GAP SERPL CALC-SCNC: 7 MMOL/L — SIGNIFICANT CHANGE UP (ref 5–17)
BUN SERPL-MCNC: 16 MG/DL — SIGNIFICANT CHANGE UP (ref 7–23)
CALCIUM SERPL-MCNC: 9 MG/DL — SIGNIFICANT CHANGE UP (ref 8.4–10.5)
CHLORIDE SERPL-SCNC: 103 MMOL/L — SIGNIFICANT CHANGE UP (ref 96–108)
CO2 SERPL-SCNC: 31 MMOL/L — SIGNIFICANT CHANGE UP (ref 22–31)
CREAT SERPL-MCNC: 0.64 MG/DL — SIGNIFICANT CHANGE UP (ref 0.5–1.3)
GLUCOSE SERPL-MCNC: 110 MG/DL — HIGH (ref 70–99)
HCT VFR BLD CALC: 36 % — LOW (ref 39–50)
HCV AB S/CO SERPL IA: 11.24 S/CO — HIGH (ref 0–0.99)
HCV AB SERPL-IMP: REACTIVE
HGB BLD-MCNC: 11.8 G/DL — LOW (ref 13–17)
INR BLD: 1.37 RATIO — HIGH (ref 0.88–1.16)
MCHC RBC-ENTMCNC: 31.9 PG — SIGNIFICANT CHANGE UP (ref 27–34)
MCHC RBC-ENTMCNC: 32.8 GM/DL — SIGNIFICANT CHANGE UP (ref 32–36)
MCV RBC AUTO: 97.3 FL — SIGNIFICANT CHANGE UP (ref 80–100)
NRBC # BLD: 0 /100 WBCS — SIGNIFICANT CHANGE UP (ref 0–0)
PLATELET # BLD AUTO: 179 K/UL — SIGNIFICANT CHANGE UP (ref 150–400)
POTASSIUM SERPL-MCNC: 3.9 MMOL/L — SIGNIFICANT CHANGE UP (ref 3.5–5.3)
POTASSIUM SERPL-SCNC: 3.9 MMOL/L — SIGNIFICANT CHANGE UP (ref 3.5–5.3)
PROTHROM AB SERPL-ACNC: 15.5 SEC — HIGH (ref 10–12.9)
RBC # BLD: 3.7 M/UL — LOW (ref 4.2–5.8)
RBC # FLD: 13.2 % — SIGNIFICANT CHANGE UP (ref 10.3–14.5)
SODIUM SERPL-SCNC: 141 MMOL/L — SIGNIFICANT CHANGE UP (ref 135–145)
WBC # BLD: 9.69 K/UL — SIGNIFICANT CHANGE UP (ref 3.8–10.5)
WBC # FLD AUTO: 9.69 K/UL — SIGNIFICANT CHANGE UP (ref 3.8–10.5)

## 2020-02-29 PROCEDURE — 27244 TREAT THIGH FRACTURE: CPT | Mod: AS,RT

## 2020-02-29 PROCEDURE — 27244 TREAT THIGH FRACTURE: CPT | Mod: RT

## 2020-02-29 PROCEDURE — 99233 SBSQ HOSP IP/OBS HIGH 50: CPT | Mod: GC

## 2020-02-29 RX ORDER — ALBUTEROL 90 UG/1
2 AEROSOL, METERED ORAL EVERY 6 HOURS
Refills: 0 | Status: DISCONTINUED | OUTPATIENT
Start: 2020-02-29 | End: 2020-03-10

## 2020-02-29 RX ORDER — CEFAZOLIN SODIUM 1 G
2000 VIAL (EA) INJECTION EVERY 8 HOURS
Refills: 0 | Status: COMPLETED | OUTPATIENT
Start: 2020-03-01 | End: 2020-03-01

## 2020-02-29 RX ORDER — LIDOCAINE 4 G/100G
1 CREAM TOPICAL EVERY 24 HOURS
Refills: 0 | Status: DISCONTINUED | OUTPATIENT
Start: 2020-02-29 | End: 2020-03-10

## 2020-02-29 RX ORDER — SENNA PLUS 8.6 MG/1
2 TABLET ORAL AT BEDTIME
Refills: 0 | Status: DISCONTINUED | OUTPATIENT
Start: 2020-03-01 | End: 2020-03-10

## 2020-02-29 RX ORDER — ONDANSETRON 8 MG/1
4 TABLET, FILM COATED ORAL EVERY 6 HOURS
Refills: 0 | Status: DISCONTINUED | OUTPATIENT
Start: 2020-02-29 | End: 2020-03-10

## 2020-02-29 RX ORDER — ACETAMINOPHEN 500 MG
650 TABLET ORAL EVERY 6 HOURS
Refills: 0 | Status: DISCONTINUED | OUTPATIENT
Start: 2020-02-29 | End: 2020-03-10

## 2020-02-29 RX ORDER — FINASTERIDE 5 MG/1
5 TABLET, FILM COATED ORAL DAILY
Refills: 0 | Status: DISCONTINUED | OUTPATIENT
Start: 2020-02-29 | End: 2020-03-10

## 2020-02-29 RX ORDER — ASCORBIC ACID 60 MG
500 TABLET,CHEWABLE ORAL DAILY
Refills: 0 | Status: DISCONTINUED | OUTPATIENT
Start: 2020-02-29 | End: 2020-03-10

## 2020-02-29 RX ORDER — ENOXAPARIN SODIUM 100 MG/ML
40 INJECTION SUBCUTANEOUS DAILY
Refills: 0 | Status: DISCONTINUED | OUTPATIENT
Start: 2020-03-01 | End: 2020-03-04

## 2020-02-29 RX ORDER — HYDROMORPHONE HYDROCHLORIDE 2 MG/ML
0.5 INJECTION INTRAMUSCULAR; INTRAVENOUS; SUBCUTANEOUS
Refills: 0 | Status: DISCONTINUED | OUTPATIENT
Start: 2020-02-29 | End: 2020-02-29

## 2020-02-29 RX ORDER — MORPHINE SULFATE 50 MG/1
4 CAPSULE, EXTENDED RELEASE ORAL
Refills: 0 | Status: DISCONTINUED | OUTPATIENT
Start: 2020-02-29 | End: 2020-03-07

## 2020-02-29 RX ORDER — LANOLIN ALCOHOL/MO/W.PET/CERES
3 CREAM (GRAM) TOPICAL AT BEDTIME
Refills: 0 | Status: DISCONTINUED | OUTPATIENT
Start: 2020-02-29 | End: 2020-03-10

## 2020-02-29 RX ORDER — SODIUM CHLORIDE 9 MG/ML
1000 INJECTION, SOLUTION INTRAVENOUS
Refills: 0 | Status: DISCONTINUED | OUTPATIENT
Start: 2020-02-29 | End: 2020-02-29

## 2020-02-29 RX ORDER — SODIUM CHLORIDE 9 MG/ML
1000 INJECTION, SOLUTION INTRAVENOUS
Refills: 0 | Status: DISCONTINUED | OUTPATIENT
Start: 2020-02-29 | End: 2020-03-03

## 2020-02-29 RX ORDER — POLYETHYLENE GLYCOL 3350 17 G/17G
17 POWDER, FOR SOLUTION ORAL DAILY
Refills: 0 | Status: DISCONTINUED | OUTPATIENT
Start: 2020-03-01 | End: 2020-03-04

## 2020-02-29 RX ORDER — LATANOPROST 0.05 MG/ML
1 SOLUTION/ DROPS OPHTHALMIC; TOPICAL AT BEDTIME
Refills: 0 | Status: DISCONTINUED | OUTPATIENT
Start: 2020-02-29 | End: 2020-03-10

## 2020-02-29 RX ORDER — MORPHINE SULFATE 50 MG/1
200 CAPSULE, EXTENDED RELEASE ORAL DAILY
Refills: 0 | Status: DISCONTINUED | OUTPATIENT
Start: 2020-02-29 | End: 2020-03-07

## 2020-02-29 RX ORDER — LACTULOSE 10 G/15ML
10 SOLUTION ORAL DAILY
Refills: 0 | Status: DISCONTINUED | OUTPATIENT
Start: 2020-02-29 | End: 2020-03-10

## 2020-02-29 RX ORDER — ONDANSETRON 8 MG/1
4 TABLET, FILM COATED ORAL ONCE
Refills: 0 | Status: DISCONTINUED | OUTPATIENT
Start: 2020-02-29 | End: 2020-02-29

## 2020-02-29 RX ORDER — TAMSULOSIN HYDROCHLORIDE 0.4 MG/1
0.8 CAPSULE ORAL AT BEDTIME
Refills: 0 | Status: DISCONTINUED | OUTPATIENT
Start: 2020-02-29 | End: 2020-03-10

## 2020-02-29 RX ORDER — MORPHINE SULFATE 50 MG/1
100 CAPSULE, EXTENDED RELEASE ORAL AT BEDTIME
Refills: 0 | Status: DISCONTINUED | OUTPATIENT
Start: 2020-02-29 | End: 2020-03-07

## 2020-02-29 RX ADMIN — HYDROMORPHONE HYDROCHLORIDE 0.5 MILLIGRAM(S): 2 INJECTION INTRAMUSCULAR; INTRAVENOUS; SUBCUTANEOUS at 18:45

## 2020-02-29 RX ADMIN — HYDROMORPHONE HYDROCHLORIDE 0.5 MILLIGRAM(S): 2 INJECTION INTRAMUSCULAR; INTRAVENOUS; SUBCUTANEOUS at 18:32

## 2020-02-29 RX ADMIN — HYDROMORPHONE HYDROCHLORIDE 0.5 MILLIGRAM(S): 2 INJECTION INTRAMUSCULAR; INTRAVENOUS; SUBCUTANEOUS at 18:22

## 2020-02-29 RX ADMIN — MORPHINE SULFATE 2 MILLIGRAM(S): 50 CAPSULE, EXTENDED RELEASE ORAL at 07:00

## 2020-02-29 RX ADMIN — HYDROMORPHONE HYDROCHLORIDE 0.5 MILLIGRAM(S): 2 INJECTION INTRAMUSCULAR; INTRAVENOUS; SUBCUTANEOUS at 18:35

## 2020-02-29 RX ADMIN — MORPHINE SULFATE 100 MILLIGRAM(S): 50 CAPSULE, EXTENDED RELEASE ORAL at 22:17

## 2020-02-29 RX ADMIN — Medication 500 MILLIGRAM(S): at 11:48

## 2020-02-29 RX ADMIN — MORPHINE SULFATE 2 MILLIGRAM(S): 50 CAPSULE, EXTENDED RELEASE ORAL at 11:45

## 2020-02-29 RX ADMIN — MORPHINE SULFATE 2 MILLIGRAM(S): 50 CAPSULE, EXTENDED RELEASE ORAL at 00:00

## 2020-02-29 RX ADMIN — LATANOPROST 1 DROP(S): 0.05 SOLUTION/ DROPS OPHTHALMIC; TOPICAL at 22:18

## 2020-02-29 RX ADMIN — MORPHINE SULFATE 100 MILLIGRAM(S): 50 CAPSULE, EXTENDED RELEASE ORAL at 23:15

## 2020-02-29 RX ADMIN — TAMSULOSIN HYDROCHLORIDE 0.8 MILLIGRAM(S): 0.4 CAPSULE ORAL at 22:19

## 2020-02-29 RX ADMIN — Medication 400 MILLIGRAM(S): at 22:20

## 2020-02-29 RX ADMIN — HYDROMORPHONE HYDROCHLORIDE 0.5 MILLIGRAM(S): 2 INJECTION INTRAMUSCULAR; INTRAVENOUS; SUBCUTANEOUS at 18:10

## 2020-02-29 RX ADMIN — MORPHINE SULFATE 4 MILLIGRAM(S): 50 CAPSULE, EXTENDED RELEASE ORAL at 23:15

## 2020-02-29 RX ADMIN — Medication 100 MILLIGRAM(S): at 23:14

## 2020-02-29 RX ADMIN — LIDOCAINE 1 PATCH: 4 CREAM TOPICAL at 10:02

## 2020-02-29 RX ADMIN — FINASTERIDE 5 MILLIGRAM(S): 5 TABLET, FILM COATED ORAL at 11:48

## 2020-02-29 RX ADMIN — HYDROMORPHONE HYDROCHLORIDE 0.5 MILLIGRAM(S): 2 INJECTION INTRAMUSCULAR; INTRAVENOUS; SUBCUTANEOUS at 18:20

## 2020-02-29 RX ADMIN — Medication 3 MILLIGRAM(S): at 22:19

## 2020-02-29 RX ADMIN — MORPHINE SULFATE 4 MILLIGRAM(S): 50 CAPSULE, EXTENDED RELEASE ORAL at 22:17

## 2020-02-29 RX ADMIN — SODIUM CHLORIDE 100 MILLILITER(S): 9 INJECTION, SOLUTION INTRAVENOUS at 12:43

## 2020-02-29 RX ADMIN — CHLORHEXIDINE GLUCONATE 1 APPLICATION(S): 213 SOLUTION TOPICAL at 06:30

## 2020-02-29 RX ADMIN — MORPHINE SULFATE 2 MILLIGRAM(S): 50 CAPSULE, EXTENDED RELEASE ORAL at 06:39

## 2020-02-29 NOTE — PROGRESS NOTE ADULT - SUBJECTIVE AND OBJECTIVE BOX
HPI  Pt is a 68yo M admitted to [facility] s/p with left intertrochanteric fx.   Pt was seen and examined at bedside. states have left hip pain. Hemodynamically stable 	    Vital Signs Last 24 Hrs  T(C): 37.3 (29 Feb 2020 05:17), Max: 38.1 (28 Feb 2020 20:31)  T(F): 99.1 (29 Feb 2020 05:17), Max: 100.5 (28 Feb 2020 20:31)  HR: 86 (29 Feb 2020 05:17) (78 - 94)  BP: 106/63 (29 Feb 2020 05:17) (106/63 - 131/67)  BP(mean): --  RR: 16 (29 Feb 2020 05:17) (16 - 17)  SpO2: 95% (29 Feb 2020 05:17) (94% - 96%)    I&O's Summary    28 Feb 2020 07:01  -  29 Feb 2020 07:00  --------------------------------------------------------  IN: 200 mL / OUT: 400 mL / NET: -200 mL        CAPILLARY BLOOD GLUCOSE          PHYSICAL EXAM:    Constitutional: NAD, awake and alert, well-developed  Respiratory: Breath sounds are clear bilaterally, No wheezing,    Cardiovascular: S1 and S2,    Gastrointestinal: Bowel Sounds present,    Extremities: No peripheral edema  Vascular: 2+ peripheral pulses  Neurological: A/O x 3, no focal deficits  Musculoskeletal: left hip pain   Skin: No rashes    MEDICATIONS:  MEDICATIONS  (STANDING):  ascorbic acid 500 milliGRAM(s) Oral daily  finasteride 5 milliGRAM(s) Oral daily  latanoprost 0.005% Ophthalmic Solution 1 Drop(s) Both EYES at bedtime  melatonin 3 milliGRAM(s) Oral at bedtime  morphine ER Tablet 200 milliGRAM(s) Oral daily  morphine ER Tablet 100 milliGRAM(s) Oral at bedtime  phenytoin   Capsule 400 milliGRAM(s) Oral at bedtime  tamsulosin 0.8 milliGRAM(s) Oral at bedtime      LABS: All Labs Reviewed:                        11.8   9.69  )-----------( 179      ( 29 Feb 2020 07:00 )             36.0     02-29    141  |  103  |  16  ----------------------------<  110<H>  3.9   |  31  |  0.64    Ca    9.0      29 Feb 2020 07:00    TPro  8.2  /  Alb  3.9  /  TBili  0.5  /  DBili  x   /  AST  16  /  ALT  22  /  AlkPhos  136<H>  02-28    PT/INR - ( 29 Feb 2020 07:00 )   PT: 15.5 sec;   INR: 1.37 ratio         PTT - ( 28 Feb 2020 15:55 )  PTT:32.1 sec      Blood Culture:     RADIOLOGY/EKG:    DVT PPX:    ADVANCED DIRECTIVE:    DISPOSITION:

## 2020-02-29 NOTE — BRIEF OPERATIVE NOTE - NSICDXBRIEFPROCEDURE_GEN_ALL_CORE_FT
PROCEDURES:  ORIF fracture of femoral neck fracture with dynamic hip screw (DHS) 29-Feb-2020 18:05:00 Left hip IT fracture Kitty Denise

## 2020-02-29 NOTE — PROGRESS NOTE ADULT - ASSESSMENT
Pt is a 68yo M admitted to [facility] s/p with left intertrochanteric fx.     * left intertrochanteric fx  NPO after midnight   pending OR today   pain management   ortho consult appreciated     *COPD   not formally diagnosed  seen on CXR   pending Pul consult for OR clearance    *BPH  cont Finasteride, tamsulosin     *osteoporosis  Alendronate 70mg q week hold     *Chronic pain syndrom   - Takes PO morphine sulfate ER 200mg in morning, morphine sulfate IR 60mg in afternoon, and morphine sulfate ER 100mg at night   - Will keep standing morning and evening doses  - Confirmed ISTOPP Reference #: [id number]     *DVT ppx   lovenox  hold now due to pending OR today

## 2020-02-29 NOTE — CONSULT NOTE ADULT - ASSESSMENT
Assessment  1. Chronic COPD likely as Ex smoker- suspect Mild/Asymptomatic  2. MARY GRACE - not tolerated cpap / bipap  3. Mechanical fall with Left hip fx  4. Underlying Osteoporosis, BPH (benign prostatic hyperplasia), Back pain, Osteoarthritis    Plan  - No Absolute pulmonary Contraindications for planed ORIF for today  - If able spinal aesthesia perforable over general, but not mandatory .  - Post operative care should include Incentive spirometry, pulse ox monitoring and Duonebs PRN.  - Can consider post op ABG based clinical status.  - Will follow   - D/w Medical and Surgical team.

## 2020-02-29 NOTE — CONSULT NOTE ADULT - SUBJECTIVE AND OBJECTIVE BOX
PULMONARY CONSULT  Location of Patient : [facility] [address] ([facility])  Attending requesting Consult:Kemar Belle  Chief Complaint :  Fall - mechanical  Reason For consult : Pre op clearance    [facility] - does not [facility] name     States had PFT , PSG in past year. never advised to get any inhalers.     Initial HPI on admission:  HPI:  69 year old male with PMH of Ex smoker - Possible COPD, MARY GRACE not tolerated Cpap/BiPap, osteoarthritis, osteoporosis, glaucoma, BPH, seizure disorder, former ETOH use, chronic back pain on PO morphine s/p lumbar spine sx who presents to hospital with left hip pain.   Patient tripped over one step yesterday and fell, immediately felt pain and was unable to ambulate, he dragged himself to couch and slept on couch, next morning when he was still unable to ambulate due to pain he decided to call ambulance and come to ED yesterday. Dx with left intertrochanteric fx.    other than left hip pain is at usual good health, no sob, no RAY, able to go up stairs with out sob, no wheezing, does not recall last lung infection/pna/wheezing episode  was never on standing regimen for COPD managment    PAST MEDICAL & SURGICAL HISTORY:  Osteoporosis  BPH (benign prostatic hyperplasia)  Back pain  Osteoarthritis  History of back surgery    Allergies    No Known Allergies    Intolerances      FAMILY HISTORY:    Social history: was in [employer]     Smoking: Ex smoker, quit 4-5 years ago, smoked on off ~ 1 pk /day     Drinking: Ex ETOH use     Drug use: denies active use .    Review of Systems: as stated above    CONSTITUTIONAL: No fever, No chills, No fatigue  EYES: No eye pain, No visual disturbances, No discharge  ENMT:  No difficulty hearing, No tinnitus, No vertigo; No sinus or throat pain  NECK: No pain, No stiffness  RESPIRATORY: No Cough, No SOB, No Secretions  CARDIOVASCULAR: No chest pain, No palpitations, No dizziness, or No leg swelling  GASTROINTESTINAL: No abdominal or epigastric pain. No nausea, No vomiting, No hematemesis; No diarrhea, No constipation. No melena, No hematochezia.  GENITOURINARY: No dysuria, No frequency, No hematuria, No incontinence  NEUROLOGICAL: No headaches, No memory loss, No loss of strength, No numbness, No tremors  SKIN: No itching, No burning, No rashes, No lesions   MUSCULOSKELETAL: +joint pain ; No muscle, back, No extremity pain  PSYCHIATRIC: No depression, No anxiety, No mood swings, No difficulty sleeping      Medications:  MEDICATIONS  (STANDING):  ascorbic acid 500 milliGRAM(s) Oral daily  finasteride 5 milliGRAM(s) Oral daily  latanoprost 0.005% Ophthalmic Solution 1 Drop(s) Both EYES at bedtime  melatonin 3 milliGRAM(s) Oral at bedtime  morphine ER Tablet 200 milliGRAM(s) Oral daily  morphine ER Tablet 100 milliGRAM(s) Oral at bedtime  phenytoin   Capsule 400 milliGRAM(s) Oral at bedtime  tamsulosin 0.8 milliGRAM(s) Oral at bedtime    MEDICATIONS  (PRN):  acetaminophen   Tablet .. 650 milliGRAM(s) Oral every 6 hours PRN Temp greater or equal to 38C (100.4F), Mild Pain (1 - 3), Moderate Pain (4 - 6)  ALBUTerol    90 MICROgram(s) HFA Inhaler 2 Puff(s) Inhalation every 6 hours PRN Shortness of Breath and/or Wheezing  lactulose Syrup 10 Gram(s) Oral daily PRN constipation  lidocaine   Patch 1 Patch Transdermal every 24 hours PRN pain  morphine  - Injectable 2 milliGRAM(s) IV Push every 4 hours PRN Severe Pain (7 - 10)      Home Medications:  Last Order Reconciliation Date: 02-28-20 @ 17:52 (Admission Reconciliation)  albuterol 90 mcg/inh inhalation powder: 2 puff(s) inhaled every 6 hours, As Needed (02-28-20 @ 17:45)  alendronate 70 mg oral tablet: 1 tab(s) orally once a week (02-28-20 @ 17:45)  ascorbic acid 1000 mg oral tablet, extended release: orally once a day (02-28-20 @ 17:45)  carboxymethylcellulose-Na hyaluronate: to each affected eye 2 times a day  (02-28-20 @ 17:45)  Dilantin 100 mg oral capsule: 4 cap(s) orally once a day (at bedtime) (02-28-20 @ 17:45)  finasteride 5 mg oral tablet: 1 tab(s) orally once a day (02-28-20 @ 17:45)  lactulose 10 g/15 mL oral solution: orally once a day (at bedtime), As Needed (02-28-20 @ 17:45)  latanoprost 0.005% ophthalmic solution: 1 drop(s) to each affected eye once a day (in the evening) (02-28-20 @ 17:45)  Lidoderm:  (02-28-20 @ 17:45)  Melatonin 1 mg oral tablet: 2 tab(s) orally once a day (at bedtime) (02-28-20 @ 17:45)  mirabegron 50 mg oral tablet, extended release: 1 tab(s) orally once a day (02-28-20 @ 17:45)  morphine 100 mg/12 hours oral tablet, extended release: 100  mg orally every evening (02-28-20 @ 17:45)  morphine 200 mg/12 hours oral tablet, extended release: orally once a day (02-28-20 @ 17:45)  morphine 30 mg oral tablet: orally every 12 hours, As Needed (02-28-20 @ 17:45)  tamsulosin 0.4 mg oral capsule: 2 cap(s) orally once a day (at bedtime) (02-28-20 @ 17:45)  Tylenol: 650 milligram(s) orally every 4 hours, As Needed (02-28-20 @ 17:45)  Vitamin D2 400 intl units oral tablet: 2 tab(s) orally once a day (02-28-20 @ 17:45)      LABS:                        11.8   9.69  )-----------( 179      ( 29 Feb 2020 07:00 )             36.0     02-29    141  |  103  |  16  ----------------------------<  110<H>  3.9   |  31  |  0.64    Ca    9.0      29 Feb 2020 07:00    TPro  8.2  /  Alb  3.9  /  TBili  0.5  /  DBili  x   /  AST  16  /  ALT  22  /  AlkPhos  136<H>  02-28            PT/INR - ( 29 Feb 2020 07:00 )   PT: 15.5 sec;   INR: 1.37 ratio         PTT - ( 28 Feb 2020 15:55 )  PTT:32.1 sec            CULTURES: (if applicable)          CAPILLARY BLOOD GLUCOSE          RADIOLOGY  CXR:    < from: Xray Chest 1 View AP/PA (02.28.20 @ 16:41) >  INTERPRETATION:  AP erect chest on February 28, 2020 at 4:36 PM. 2 views obtained. Patient is admitted with a left hip intertrochanteric fracture.    COMPARISON: None available.    COPD hyperexpansion of the lungs is suggested.    Heart is normal in size.    No lung consolidations or layering effusions are evident.    IMPRESSION: COPD.    < end of copied text >        VITALS:  T(C): 37.3 (02-29-20 @ 05:17), Max: 38.1 (02-28-20 @ 20:31)  T(F): 99.1 (02-29-20 @ 05:17), Max: 100.5 (02-28-20 @ 20:31)  HR: 86 (02-29-20 @ 05:17) (78 - 94)  BP: 106/63 (02-29-20 @ 05:17) (106/63 - 131/67)  BP(mean): --  ABP: --  ABP(mean): --  RR: 16 (02-29-20 @ 05:17) (16 - 17)  SpO2: 95% (02-29-20 @ 05:17) (94% - 96%)  CVP(mm Hg): --  CVP(cm H2O): --    Ins and Outs     02-28-20 @ 07:01  -  02-29-20 @ 07:00  --------------------------------------------------------  IN: 200 mL / OUT: 400 mL / NET: -200 mL        Height (cm): 175.3 (02-28-20 @ 18:56)  Weight (kg): 57.1 (02-28-20 @ 18:56)  BMI (kg/m2): 18.6 (02-28-20 @ 18:56)        I&O's Detail    28 Feb 2020 07:01  -  29 Feb 2020 07:00  --------------------------------------------------------  IN:    Oral Fluid: 200 mL  Total IN: 200 mL    OUT:    Voided: 400 mL  Total OUT: 400 mL    Total NET: -200 mL          Physical Examination:  GENERAL:               Alert, Oriented, No acute distress.    HEENT:                   No JVD, Moist MM  PULM:                     Bilateral air entry, Clear to auscultation bilaterally, no significant sputum production, No Rales, No Rhonchi, No Wheezing  CVS:                         S1, S2,  No Murmur  ABD:                        Soft, nondistended, nontender, normoactive bowel sounds,   EXT:                         No edema, nontender, No Cyanosis or Clubbing    NEURO:                  Alert, oriented, interactive, nonfocal, follows commands  PSYC:                      Calm, + Insight.

## 2020-03-01 DIAGNOSIS — S72.002A FRACTURE OF UNSPECIFIED PART OF NECK OF LEFT FEMUR, INITIAL ENCOUNTER FOR CLOSED FRACTURE: ICD-10-CM

## 2020-03-01 LAB
ANION GAP SERPL CALC-SCNC: 6 MMOL/L — SIGNIFICANT CHANGE UP (ref 5–17)
BUN SERPL-MCNC: 11 MG/DL — SIGNIFICANT CHANGE UP (ref 7–23)
CALCIUM SERPL-MCNC: 8.4 MG/DL — SIGNIFICANT CHANGE UP (ref 8.4–10.5)
CHLORIDE SERPL-SCNC: 102 MMOL/L — SIGNIFICANT CHANGE UP (ref 96–108)
CO2 SERPL-SCNC: 30 MMOL/L — SIGNIFICANT CHANGE UP (ref 22–31)
CREAT SERPL-MCNC: 0.55 MG/DL — SIGNIFICANT CHANGE UP (ref 0.5–1.3)
GLUCOSE SERPL-MCNC: 121 MG/DL — HIGH (ref 70–99)
HCT VFR BLD CALC: 33.8 % — LOW (ref 39–50)
HGB BLD-MCNC: 11 G/DL — LOW (ref 13–17)
MCHC RBC-ENTMCNC: 31.5 PG — SIGNIFICANT CHANGE UP (ref 27–34)
MCHC RBC-ENTMCNC: 32.5 GM/DL — SIGNIFICANT CHANGE UP (ref 32–36)
MCV RBC AUTO: 96.8 FL — SIGNIFICANT CHANGE UP (ref 80–100)
NRBC # BLD: 0 /100 WBCS — SIGNIFICANT CHANGE UP (ref 0–0)
PLATELET # BLD AUTO: 181 K/UL — SIGNIFICANT CHANGE UP (ref 150–400)
POTASSIUM SERPL-MCNC: 3.6 MMOL/L — SIGNIFICANT CHANGE UP (ref 3.5–5.3)
POTASSIUM SERPL-SCNC: 3.6 MMOL/L — SIGNIFICANT CHANGE UP (ref 3.5–5.3)
RBC # BLD: 3.49 M/UL — LOW (ref 4.2–5.8)
RBC # FLD: 13.2 % — SIGNIFICANT CHANGE UP (ref 10.3–14.5)
SODIUM SERPL-SCNC: 138 MMOL/L — SIGNIFICANT CHANGE UP (ref 135–145)
WBC # BLD: 10.92 K/UL — HIGH (ref 3.8–10.5)
WBC # FLD AUTO: 10.92 K/UL — HIGH (ref 3.8–10.5)

## 2020-03-01 PROCEDURE — 99233 SBSQ HOSP IP/OBS HIGH 50: CPT | Mod: GC

## 2020-03-01 RX ADMIN — MORPHINE SULFATE 200 MILLIGRAM(S): 50 CAPSULE, EXTENDED RELEASE ORAL at 11:41

## 2020-03-01 RX ADMIN — LATANOPROST 1 DROP(S): 0.05 SOLUTION/ DROPS OPHTHALMIC; TOPICAL at 21:59

## 2020-03-01 RX ADMIN — MORPHINE SULFATE 200 MILLIGRAM(S): 50 CAPSULE, EXTENDED RELEASE ORAL at 12:41

## 2020-03-01 RX ADMIN — Medication 3 MILLIGRAM(S): at 21:59

## 2020-03-01 RX ADMIN — MORPHINE SULFATE 4 MILLIGRAM(S): 50 CAPSULE, EXTENDED RELEASE ORAL at 06:07

## 2020-03-01 RX ADMIN — Medication 400 MILLIGRAM(S): at 21:59

## 2020-03-01 RX ADMIN — TAMSULOSIN HYDROCHLORIDE 0.8 MILLIGRAM(S): 0.4 CAPSULE ORAL at 21:59

## 2020-03-01 RX ADMIN — MORPHINE SULFATE 4 MILLIGRAM(S): 50 CAPSULE, EXTENDED RELEASE ORAL at 11:11

## 2020-03-01 RX ADMIN — MORPHINE SULFATE 4 MILLIGRAM(S): 50 CAPSULE, EXTENDED RELEASE ORAL at 06:20

## 2020-03-01 RX ADMIN — FINASTERIDE 5 MILLIGRAM(S): 5 TABLET, FILM COATED ORAL at 11:42

## 2020-03-01 RX ADMIN — SENNA PLUS 2 TABLET(S): 8.6 TABLET ORAL at 21:59

## 2020-03-01 RX ADMIN — MORPHINE SULFATE 100 MILLIGRAM(S): 50 CAPSULE, EXTENDED RELEASE ORAL at 23:18

## 2020-03-01 RX ADMIN — ENOXAPARIN SODIUM 40 MILLIGRAM(S): 100 INJECTION SUBCUTANEOUS at 11:42

## 2020-03-01 RX ADMIN — MORPHINE SULFATE 4 MILLIGRAM(S): 50 CAPSULE, EXTENDED RELEASE ORAL at 01:33

## 2020-03-01 RX ADMIN — Medication 500 MILLIGRAM(S): at 11:42

## 2020-03-01 RX ADMIN — MORPHINE SULFATE 4 MILLIGRAM(S): 50 CAPSULE, EXTENDED RELEASE ORAL at 01:45

## 2020-03-01 RX ADMIN — MORPHINE SULFATE 100 MILLIGRAM(S): 50 CAPSULE, EXTENDED RELEASE ORAL at 22:17

## 2020-03-01 RX ADMIN — Medication 100 MILLIGRAM(S): at 08:54

## 2020-03-01 RX ADMIN — SODIUM CHLORIDE 100 MILLILITER(S): 9 INJECTION, SOLUTION INTRAVENOUS at 12:56

## 2020-03-01 RX ADMIN — MORPHINE SULFATE 4 MILLIGRAM(S): 50 CAPSULE, EXTENDED RELEASE ORAL at 10:56

## 2020-03-01 RX ADMIN — LIDOCAINE 1 PATCH: 4 CREAM TOPICAL at 00:26

## 2020-03-01 NOTE — PROGRESS NOTE ADULT - ASSESSMENT
Pt is a 68yo M admitted to [facility] s/p with left intertrochanteric fx.     * left intertrochanteric fx  s/p ORIF POD 1    pain management   ortho consult appreciated     *COPD   not formally diagnosed  seen on CXR   pul consult appreicated     *BPH  cont Finasteride, tamsulosin     *Hep C  follow up out pt GI     *osteoporosis  Alendronate 70mg q week hold     *Chronic pain syndrom   - Takes PO morphine sulfate ER 200mg in morning, morphine sulfate IR 60mg in afternoon, and morphine sulfate ER 100mg at night   - Will keep standing morning and evening doses  - Confirmed ISTOPP Reference #: [id number]     *DVT ppx   Lovenox

## 2020-03-01 NOTE — PROGRESS NOTE ADULT - ASSESSMENT
Physical Examination:  GENERAL:               Alert, Oriented, No acute distress.    HEENT:                   No JVD, Moist MM  PULM:                     Bilateral air entry, Clear to auscultation bilaterally, no significant sputum production, No Rales, No Rhonchi, No Wheezing  CVS:                         S1, S2,  No Murmur  ABD:                        Soft, nondistended, nontender, normoactive bowel sounds,   NEURO:                  Alert, oriented, interactive, nonfocal, follows commands  PSYC:                      Calm, + Insight.    Assessment  1. Chronic COPD likely as Ex smoker- suspect Mild/Asymptomatic  2. MARY GRACE - not tolerated cpap / bipap  3. Mechanical fall with Left hip fx  4. Underlying Osteoporosis, BPH (benign prostatic hyperplasia), Back pain, Osteoarthritis    Plan  - Incentive spirometry  - carol PRN  - OOB, PT  - Care as per primary team.

## 2020-03-01 NOTE — PROGRESS NOTE ADULT - SUBJECTIVE AND OBJECTIVE BOX
HPI  Pt is a 68yo M admitted to [facility] s/p with left intertrochanteric fx.   Pt was seen and examined at bedside. states have left hip pain at incision site. Hemodynamically stable       Vital Signs Last 24 Hrs  T(C): 37.2 (01 Mar 2020 05:39), Max: 37.2 (01 Mar 2020 05:39)  T(F): 98.9 (01 Mar 2020 05:39), Max: 98.9 (01 Mar 2020 05:39)  HR: 90 (01 Mar 2020 05:39) (74 - 90)  BP: 111/63 (01 Mar 2020 05:39) (101/58 - 119/80)  BP(mean): 69 (29 Feb 2020 18:42) (69 - 88)  RR: 13 (01 Mar 2020 05:39) (13 - 18)  SpO2: 94% (01 Mar 2020 05:39) (92% - 100%)    I&O's Summary    29 Feb 2020 07:01  -  01 Mar 2020 07:00  --------------------------------------------------------  IN: 75 mL / OUT: 350 mL / NET: -275 mL        CAPILLARY BLOOD GLUCOSE          PHYSICAL EXAM:    Constitutional: NAD, awake and alert, well-developed  Respiratory: Breath sounds are clear bilaterally, No wheezing,    Cardiovascular: S1 and S2,    Gastrointestinal: Bowel Sounds present,    Extremities: No peripheral edema  Vascular: 2+ peripheral pulses  Neurological: A/O x 3, no focal deficits  Musculoskeletal: left hip pain   Skin: left thigh dressing noted, bruising and swelling surrounding     MEDICATIONS:  MEDICATIONS  (STANDING):  ascorbic acid 500 milliGRAM(s) Oral daily  enoxaparin Injectable 40 milliGRAM(s) SubCutaneous daily  finasteride 5 milliGRAM(s) Oral daily  lactated ringers. 1000 milliLiter(s) (100 mL/Hr) IV Continuous <Continuous>  latanoprost 0.005% Ophthalmic Solution 1 Drop(s) Both EYES at bedtime  melatonin 3 milliGRAM(s) Oral at bedtime  morphine ER Tablet 200 milliGRAM(s) Oral daily  morphine ER Tablet 100 milliGRAM(s) Oral at bedtime  phenytoin   Capsule 400 milliGRAM(s) Oral at bedtime  senna 2 Tablet(s) Oral at bedtime  tamsulosin 0.8 milliGRAM(s) Oral at bedtime      LABS: All Labs Reviewed:                        11.0   10.92 )-----------( 181      ( 01 Mar 2020 05:49 )             33.8     03-01    138  |  102  |  11  ----------------------------<  121<H>  3.6   |  30  |  0.55    Ca    8.4      01 Mar 2020 05:49    TPro  8.2  /  Alb  3.9  /  TBili  0.5  /  DBili  x   /  AST  16  /  ALT  22  /  AlkPhos  136<H>  02-28    PT/INR - ( 29 Feb 2020 07:00 )   PT: 15.5 sec;   INR: 1.37 ratio         PTT - ( 28 Feb 2020 15:55 )  PTT:32.1 sec      Blood Culture:     RADIOLOGY/EKG:    DVT PPX:    ADVANCED DIRECTIVE:    DISPOSITION:

## 2020-03-01 NOTE — ANESTHESIA FOLLOW-UP NOTE - NSRECOMMEND_GEN_ALL_CORE
Group home called. Advised needs an appointment. Mell will assist pt is making one. Mell states pt is not demonstrating any other symptoms except back pain. Wilda Naqvi RN   None

## 2020-03-01 NOTE — PHYSICAL THERAPY INITIAL EVALUATION ADULT - ADDITIONAL COMMENTS
Pt lives in private home, [address], 10 stairs down to basement apartment.  Pt has a cane that he reports he uses at times in the community or for long walking distances.  Pt. wears glasses.  Pt does not drive.

## 2020-03-01 NOTE — PHYSICAL THERAPY INITIAL EVALUATION ADULT - MANUAL MUSCLE TESTING RESULTS, REHAB EVAL
5/5 throughout bilateral UE, right LE 4+/5 t/o all movements, left ankle DF/PF 3/5, knee/hip not tested due to sx/pain

## 2020-03-01 NOTE — PROGRESS NOTE ADULT - SUBJECTIVE AND OBJECTIVE BOX
Follow-up Pulmonary Progress Note  Chief Complaint : Fracture of unspecified part of neck of left femur, initial encounter for closed fracture      S/P hip ORIF yesterday  doing well  no acute respiratory complaints    Allergies :Flonase (Headache)  No Known Allergies      PAST MEDICAL & SURGICAL HISTORY:  Osteoporosis  BPH (benign prostatic hyperplasia)  Back pain  Osteoarthritis  History of back surgery      Medications:  MEDICATIONS  (STANDING):  ascorbic acid 500 milliGRAM(s) Oral daily  enoxaparin Injectable 40 milliGRAM(s) SubCutaneous daily  finasteride 5 milliGRAM(s) Oral daily  lactated ringers. 1000 milliLiter(s) (100 mL/Hr) IV Continuous <Continuous>  latanoprost 0.005% Ophthalmic Solution 1 Drop(s) Both EYES at bedtime  melatonin 3 milliGRAM(s) Oral at bedtime  morphine ER Tablet 200 milliGRAM(s) Oral daily  morphine ER Tablet 100 milliGRAM(s) Oral at bedtime  phenytoin   Capsule 400 milliGRAM(s) Oral at bedtime  senna 2 Tablet(s) Oral at bedtime  tamsulosin 0.8 milliGRAM(s) Oral at bedtime    MEDICATIONS  (PRN):  acetaminophen   Tablet .. 650 milliGRAM(s) Oral every 6 hours PRN Temp greater or equal to 38C (100.4F)  ALBUTerol    90 MICROgram(s) HFA Inhaler 2 Puff(s) Inhalation every 6 hours PRN Shortness of Breath and/or Wheezing  lactulose Syrup 10 Gram(s) Oral daily PRN constipation  lidocaine   Patch 1 Patch Transdermal every 24 hours PRN pain  morphine  - Injectable 4 milliGRAM(s) IV Push every 3 hours PRN Severe Pain (7 - 10)  ondansetron Injectable 4 milliGRAM(s) IV Push every 6 hours PRN Nausea and/or Vomiting  polyethylene glycol 3350 17 Gram(s) Oral daily PRN Constipation      LABS:                        11.0   10.92 )-----------( 181      ( 01 Mar 2020 05:49 )             33.8     03-01    138  |  102  |  11  ----------------------------<  121<H>  3.6   |  30  |  0.55    Ca    8.4      01 Mar 2020 05:49    TPro  8.2  /  Alb  3.9  /  TBili  0.5  /  DBili  x   /  AST  16  /  ALT  22  /  AlkPhos  136<H>  02-28            PT/INR - ( 29 Feb 2020 07:00 )   PT: 15.5 sec;   INR: 1.37 ratio         PTT - ( 28 Feb 2020 15:55 )  PTT:32.1 sec          VITALS:  T(C): 37.2 (03-01-20 @ 05:39), Max: 37.2 (03-01-20 @ 05:39)  T(F): 98.9 (03-01-20 @ 05:39), Max: 98.9 (03-01-20 @ 05:39)  HR: 90 (03-01-20 @ 05:39) (74 - 90)  BP: 111/63 (03-01-20 @ 05:39) (101/58 - 119/80)  BP(mean): 69 (02-29-20 @ 18:42) (69 - 88)  ABP: --  ABP(mean): --  RR: 13 (03-01-20 @ 05:39) (13 - 18)  SpO2: 94% (03-01-20 @ 05:39) (92% - 100%)  CVP(mm Hg): --  CVP(cm H2O): --    Ins and Outs     02-29-20 @ 07:01  -  03-01-20 @ 07:00  --------------------------------------------------------  IN: 75 mL / OUT: 350 mL / NET: -275 mL        Height (cm): 175.26 (02-29-20 @ 15:08)  Weight (kg): 57.1 (02-29-20 @ 15:08)  BMI (kg/m2): 18.6 (02-29-20 @ 15:08)        I&O's Detail    29 Feb 2020 07:01  -  01 Mar 2020 07:00  --------------------------------------------------------  IN:    lactated ringers.: 75 mL  Total IN: 75 mL    OUT:    Voided: 350 mL  Total OUT: 350 mL    Total NET: -275 mL

## 2020-03-01 NOTE — PHYSICAL THERAPY INITIAL EVALUATION ADULT - CRITERIA FOR SKILLED THERAPEUTIC INTERVENTIONS
anticipated discharge recommendation/impairments found/therapy frequency/rehab potential/predicted duration of therapy intervention/functional limitations in following categories/risk reduction/prevention

## 2020-03-01 NOTE — PROGRESS NOTE ADULT - SUBJECTIVE AND OBJECTIVE BOX
POD 1, s/p ORIF fracture of femoral neck fracture with dynamic hip screw (DHS), pt admits to pain on Left hip, and thigh upon movement, denies cp/sob/n/v    Vital Signs Last 24 Hrs  T(C): 37.2 (01 Mar 2020 05:39), Max: 37.2 (01 Mar 2020 05:39)  T(F): 98.9 (01 Mar 2020 05:39), Max: 98.9 (01 Mar 2020 05:39)  HR: 90 (01 Mar 2020 05:39) (74 - 90)  BP: 111/63 (01 Mar 2020 05:39) (101/58 - 119/80)  BP(mean): 69 (29 Feb 2020 18:42) (69 - 88)  RR: 13 (01 Mar 2020 05:39) (13 - 18)  SpO2: 94% (01 Mar 2020 05:39) (92% - 100%)    Gen: A&o x3 nad  abd: soft, nt, nd, ng  : voiding  LLE: dressing intact swelling, tender on Left hip, bruising noted on posterior aspect,  DP/pt pulses pal, full range of motion on ankle, mild ROM on hip and knee

## 2020-03-01 NOTE — PHYSICAL THERAPY INITIAL EVALUATION ADULT - GENERAL OBSERVATIONS, REHAB EVAL
Pt encountered supine in bed with HOB elevated, + IV, + bilateral IPC, on RA, in NAD, bed alarm ON, premedicated for pain, agreeable to PT eval at this time

## 2020-03-02 LAB
ANION GAP SERPL CALC-SCNC: 6 MMOL/L — SIGNIFICANT CHANGE UP (ref 5–17)
APTT BLD: 29.4 SEC — SIGNIFICANT CHANGE UP (ref 27.5–36.3)
BUN SERPL-MCNC: 13 MG/DL — SIGNIFICANT CHANGE UP (ref 7–23)
CALCIUM SERPL-MCNC: 8.6 MG/DL — SIGNIFICANT CHANGE UP (ref 8.4–10.5)
CHLORIDE SERPL-SCNC: 104 MMOL/L — SIGNIFICANT CHANGE UP (ref 96–108)
CO2 SERPL-SCNC: 28 MMOL/L — SIGNIFICANT CHANGE UP (ref 22–31)
CREAT SERPL-MCNC: 0.56 MG/DL — SIGNIFICANT CHANGE UP (ref 0.5–1.3)
GLUCOSE SERPL-MCNC: 111 MG/DL — HIGH (ref 70–99)
HCT VFR BLD CALC: 33.7 % — LOW (ref 39–50)
HCV RNA FLD QL NAA+PROBE: SIGNIFICANT CHANGE UP
HCV RNA SPEC QL PROBE+SIG AMP: SIGNIFICANT CHANGE UP
HGB BLD-MCNC: 10.9 G/DL — LOW (ref 13–17)
INR BLD: 1.35 RATIO — HIGH (ref 0.88–1.16)
MAGNESIUM SERPL-MCNC: 1.7 MG/DL — SIGNIFICANT CHANGE UP (ref 1.6–2.6)
MCHC RBC-ENTMCNC: 31.1 PG — SIGNIFICANT CHANGE UP (ref 27–34)
MCHC RBC-ENTMCNC: 32.3 GM/DL — SIGNIFICANT CHANGE UP (ref 32–36)
MCV RBC AUTO: 96.3 FL — SIGNIFICANT CHANGE UP (ref 80–100)
NRBC # BLD: 0 /100 WBCS — SIGNIFICANT CHANGE UP (ref 0–0)
PLATELET # BLD AUTO: 188 K/UL — SIGNIFICANT CHANGE UP (ref 150–400)
POTASSIUM SERPL-MCNC: 3.7 MMOL/L — SIGNIFICANT CHANGE UP (ref 3.5–5.3)
POTASSIUM SERPL-SCNC: 3.7 MMOL/L — SIGNIFICANT CHANGE UP (ref 3.5–5.3)
PROTHROM AB SERPL-ACNC: 15.3 SEC — HIGH (ref 10–12.9)
RBC # BLD: 3.5 M/UL — LOW (ref 4.2–5.8)
RBC # FLD: 13 % — SIGNIFICANT CHANGE UP (ref 10.3–14.5)
SODIUM SERPL-SCNC: 138 MMOL/L — SIGNIFICANT CHANGE UP (ref 135–145)
WBC # BLD: 9.44 K/UL — SIGNIFICANT CHANGE UP (ref 3.8–10.5)
WBC # FLD AUTO: 9.44 K/UL — SIGNIFICANT CHANGE UP (ref 3.8–10.5)

## 2020-03-02 PROCEDURE — 99222 1ST HOSP IP/OBS MODERATE 55: CPT

## 2020-03-02 PROCEDURE — 99223 1ST HOSP IP/OBS HIGH 75: CPT

## 2020-03-02 PROCEDURE — 99232 SBSQ HOSP IP/OBS MODERATE 35: CPT | Mod: 25

## 2020-03-02 RX ADMIN — FINASTERIDE 5 MILLIGRAM(S): 5 TABLET, FILM COATED ORAL at 12:03

## 2020-03-02 RX ADMIN — Medication 400 MILLIGRAM(S): at 20:48

## 2020-03-02 RX ADMIN — Medication 500 MILLIGRAM(S): at 12:03

## 2020-03-02 RX ADMIN — MORPHINE SULFATE 4 MILLIGRAM(S): 50 CAPSULE, EXTENDED RELEASE ORAL at 06:00

## 2020-03-02 RX ADMIN — SODIUM CHLORIDE 100 MILLILITER(S): 9 INJECTION, SOLUTION INTRAVENOUS at 05:22

## 2020-03-02 RX ADMIN — SODIUM CHLORIDE 100 MILLILITER(S): 9 INJECTION, SOLUTION INTRAVENOUS at 12:03

## 2020-03-02 RX ADMIN — MORPHINE SULFATE 4 MILLIGRAM(S): 50 CAPSULE, EXTENDED RELEASE ORAL at 09:00

## 2020-03-02 RX ADMIN — ENOXAPARIN SODIUM 40 MILLIGRAM(S): 100 INJECTION SUBCUTANEOUS at 12:02

## 2020-03-02 RX ADMIN — MORPHINE SULFATE 100 MILLIGRAM(S): 50 CAPSULE, EXTENDED RELEASE ORAL at 21:58

## 2020-03-02 RX ADMIN — MORPHINE SULFATE 4 MILLIGRAM(S): 50 CAPSULE, EXTENDED RELEASE ORAL at 06:15

## 2020-03-02 RX ADMIN — LATANOPROST 1 DROP(S): 0.05 SOLUTION/ DROPS OPHTHALMIC; TOPICAL at 20:49

## 2020-03-02 RX ADMIN — Medication 3 MILLIGRAM(S): at 20:49

## 2020-03-02 RX ADMIN — MORPHINE SULFATE 100 MILLIGRAM(S): 50 CAPSULE, EXTENDED RELEASE ORAL at 22:58

## 2020-03-02 RX ADMIN — SENNA PLUS 2 TABLET(S): 8.6 TABLET ORAL at 20:49

## 2020-03-02 RX ADMIN — MORPHINE SULFATE 4 MILLIGRAM(S): 50 CAPSULE, EXTENDED RELEASE ORAL at 18:16

## 2020-03-02 RX ADMIN — MORPHINE SULFATE 4 MILLIGRAM(S): 50 CAPSULE, EXTENDED RELEASE ORAL at 09:30

## 2020-03-02 RX ADMIN — MORPHINE SULFATE 4 MILLIGRAM(S): 50 CAPSULE, EXTENDED RELEASE ORAL at 19:00

## 2020-03-02 RX ADMIN — MORPHINE SULFATE 200 MILLIGRAM(S): 50 CAPSULE, EXTENDED RELEASE ORAL at 13:01

## 2020-03-02 RX ADMIN — TAMSULOSIN HYDROCHLORIDE 0.8 MILLIGRAM(S): 0.4 CAPSULE ORAL at 20:49

## 2020-03-02 RX ADMIN — MORPHINE SULFATE 4 MILLIGRAM(S): 50 CAPSULE, EXTENDED RELEASE ORAL at 15:30

## 2020-03-02 RX ADMIN — MORPHINE SULFATE 4 MILLIGRAM(S): 50 CAPSULE, EXTENDED RELEASE ORAL at 15:01

## 2020-03-02 RX ADMIN — MORPHINE SULFATE 200 MILLIGRAM(S): 50 CAPSULE, EXTENDED RELEASE ORAL at 12:03

## 2020-03-02 NOTE — PROGRESS NOTE ADULT - ASSESSMENT
Pt is a 70yo M admitted to [facility] s/p with left intertrochanteric fx.     * left intertrochanteric fx s/p mechanical fall  s/p ORIF POD 2  pain management as per ortho  ortho consult appreciated   PT/OT eval and PM&R consult    *COPD   not formally diagnosed  seen on CXR   pul consult appreicated     *BPH  cont Finasteride, tamsulosin     *Hep C  follow up out pt GI     *osteoporosis  Alendronate 70mg q week hold    * seizure disorder   - phenytoin qHS     *Chronic pain syndrome  - Takes PO morphine sulfate ER 200mg in morning, morphine sulfate IR 60mg in afternoon, and morphine sulfate ER 100mg at night   - Will keep standing morning and evening doses  - Confirmed ISTOPP Reference #: [id number]     * BPH  -finasteride and BPH    *DVT ppx   Lovenox subq    DISPO: pending PM&R consult

## 2020-03-02 NOTE — CONSULT NOTE ADULT - ASSESSMENT
This is a 70 y/o male with PMH osteoarthritis, osteoporosis, glaucoma, BPH, seizure disorder, former smoker/ETOH use, chronic back pain on PO morphine who presented to [facility] on 2/28 with left hip pain, found to have left IT fx s/po ORIF. now with functional deficits consisting of gait and ADL impairments.     Recommendations:   Continue pain regimen per primary and surgical teams.   Continue medical management per hospitalist.   Continue bedside therapies.   Patient will likely tolerate inpatient rehab for transfer, gait and stair training as well as to improve independence with ADLs, and improve strength and endurance.   Also of note, patient tachycardic. Post blood loss anemia vs deconditioning? Will continue to follow vitals.  Encouraged patient to work with PT tomorrow morning.   Will come to follow up tomorrow prior to making final recommendations.

## 2020-03-02 NOTE — PROGRESS NOTE ADULT - ASSESSMENT
POD #2  ORIF DHS  femoral neck fracture     -discharge planning  ortho cleared for rehab  -WBAT RLE continue PT/OT  -DVT prophylaxis-lovenox  -chronic pain management per medical team  -post op anemia monitor H/H

## 2020-03-02 NOTE — OCCUPATIONAL THERAPY INITIAL EVALUATION ADULT - PLANNED THERAPY INTERVENTIONS, OT EVAL
bed mobility training/strengthening/parent/caregiver training.../ADL retraining/balance training/transfer training

## 2020-03-02 NOTE — CONSULT NOTE ADULT - SUBJECTIVE AND OBJECTIVE BOX
This is a 68 y/o male with PMH osteoarthritis, osteoporosis, glaucoma, BPH, seizure disorder, former smoker/ETOH use, chronic back pain on PO morphine who presented to [facility] on 2/28 with left hip pain. Patient reports that he tripped on the stairs while trying to take out the garbage. He turned to listen to his mother while on the stairs and lost his balance, falling onto his left hip. Patient then pulled himself into the living room, unable to ambulate, until his cousin found him the next morning.  In the ED, xray confirmed left IT fracture. Patient cleared for surgery and underwent ORIF with placement of dynamic hip screw on 2/29 by Dr Coleman.   Post operative course notable for acute blood loss anemia.  OT saw patient today and has found that he needs max assist for toilet transfers. PT found patient needed min to mod A for bed mobility, transfers and ambulation 5 steps with RW.   PM&R consulted to weigh in on disposition planning.     PAST MEDICAL & SURGICAL HISTORY:  Osteoporosis  BPH (benign prostatic hyperplasia)  Back pain  Osteoarthritis  History of back surgery    Allergies  No Known Allergies  Intolerances  Flonase (Headache)    Social HX: Patient states that he lives in a PH with mother. 5 JEAN from door, however uses side door. 10 stairs up to landing and three steps more to second floor. Bedroom on first floor, bathroom on second floor.   Patient used cane on occasion for long distance ambulation in the community. Hx back surgery in the 90s and has been on disability since surgery.   Denies use of ETOH, illicit drug use or tobacco.   Quit cigarettes 5 yrs ago.  Quit ETOH 10 yrs ago.    ROS: Reports pain in the left hip with movement that becomes severe. Pain in left hip at rest "dull" and tolerable.   Chronic pain in back generally is localized over lumbar back. Occasionally pain radiates down right leg from back and there is numbness in right leg (chronic).   Denies SOB, CP, HA, dizziness, n/v, palpitations, abdominal pain, dysuria, difficulty urinating.   Reports pain in left shoulder, chronic. Unable to describe reason for pain but was present PTA.   Does report falls in the past but can not explain what precipitates falls. Does not believe he is dizzy prior to falls. Aware of feet in space. Unsure if pain medication cause falls. Always light headed in the mornings that resolves by mid day. Does report unsteady gait for which he has been prescribed PT in the past.       03-02    138  |  104  |  13  ----------------------------<  111<H>  3.7   |  28  |  0.56    Ca    8.6      02 Mar 2020 06:44  Mg     1.7     03-02                          10.9   9.44  )-----------( 188      ( 02 Mar 2020 06:44 )             33.7       On Physical Examination:    Vital Signs Last 24 Hrs  T(C): 36.8 (02 Mar 2020 05:26), Max: 37.7 (02 Mar 2020 00:01)  T(F): 98.3 (02 Mar 2020 05:26), Max: 99.8 (02 Mar 2020 00:01)  HR: 81 (02 Mar 2020 05:26) (81 - 95)  BP: 103/66 (02 Mar 2020 05:26) (101/65 - 114/63)  RR: 14 (02 Mar 2020 05:26) (14 - 15)  SpO2: 93% (02 Mar 2020 05:26) (93% - 100%)    Mental Status - Patient is alert, awake, oriented X3. Speech is fluent. Normal attention and concentration.  Follows commands well and able to answer questions appropriately. Mood and affect  normal.  Motor Exam -   Right upper full AROM, 5/5 strengths throughout   Left upper limited AROM secondary to pain, 4/5 shoulder strengths, 5/5 elbow/wrist/hand strengths   Right lower 4+/5 right hip flexor, 5/5 knee and ankle strengths   Left lower unable to pick thigh off of chair secondary to pain, 3/5 knee flexor due to pain, 4+/5 ankle strengths  Normal muscle bulk/tone  Sensory    Intact to light touch bilaterally.                                GENERAL Exam:     Nontoxic , No Acute Distress 	  HEENT:  normocephalic, atraumatic		  LUNGS:	Clear bilaterally    HEART:	 Normal S1S2 , tachycardic     GI/ ABDOMEN:  Soft  Non tender +BS  EXTREMITIES:   No Edema    SKIN:    Aquacel dressing c/d/i

## 2020-03-02 NOTE — PROGRESS NOTE ADULT - ASSESSMENT
Physical Examination:  GENERAL:               Alert, Oriented, No acute distress.    HEENT:                   No JVD, Moist MM  PULM:                     Bilateral air entry, Clear to auscultation bilaterally, no significant sputum production, No Rales, No Rhonchi, No Wheezing  CVS:                         S1, S2,  No Murmur  ABD:                        Soft, nondistended, nontender, normoactive bowel sounds,   NEURO:                  Alert, oriented, interactive, nonfocal, follows commands  PSYC:                      Calm, + Insight.    Assessment  1. Chronic COPD likely as Ex smoker- suspect Mild/Asymptomatic  2. MARY GRACE - not tolerated cpap / bipap  3. Mechanical fall with Left hip fx  4. Underlying Osteoporosis, BPH (benign prostatic hyperplasia), Back pain, Osteoarthritis    Plan  - Incentive spirometry  - carol PRN  - OOB, PT  - can d/c continuos pulse ox  - Dispo planning appropriate from pulmonary perspective

## 2020-03-02 NOTE — PROGRESS NOTE ADULT - SUBJECTIVE AND OBJECTIVE BOX
POD #2  ORIF DHS  femoral neck fracture     Pt resting comfortably in bed. has chronic pain issues/management for back pain. No SOB No CP No calf pain voiding  No new complaints    Vital Signs Last 24 Hrs  T(C): 36.8 (02 Mar 2020 05:26), Max: 37.7 (02 Mar 2020 00:01)  T(F): 98.3 (02 Mar 2020 05:26), Max: 99.8 (02 Mar 2020 00:01)  HR: 81 (02 Mar 2020 05:26) (74 - 95)  BP: 103/66 (02 Mar 2020 05:26) (101/61 - 114/63)  BP(mean): --  RR: 14 (02 Mar 2020 05:26) (14 - 16)  SpO2: 93% (02 Mar 2020 05:26) (93% - 100%)                          10.9   9.44  )-----------( 188      ( 02 Mar 2020 06:44 )             33.7     03-02    138  |  104  |  13  ----------------------------<  111<H>  3.7   |  28  |  0.56    Ca    8.6      02 Mar 2020 06:44  Mg     1.7     03-02    I&O's Detail    01 Mar 2020 07:01  -  02 Mar 2020 07:00  --------------------------------------------------------  IN:    lactated ringers.: 2400 mL    Oral Fluid: 900 mL  Total IN: 3300 mL    OUT:    Voided: 2075 mL  Total OUT: 2075 mL    Total NET: 1225 mL      PE:  LLE: dressing intact. no active drainage. thigh mildly tense. +DP , FROM ankle, feet warm gd cap refill +EHL

## 2020-03-02 NOTE — PROGRESS NOTE ADULT - SUBJECTIVE AND OBJECTIVE BOX
Follow-up Pulmonary Progress Note  Chief Complaint : Fracture of unspecified part of neck of left femur, initial encounter for closed fracture        No new events overnight.  Denies SOB/CP.   comfortable  pain controlled      Allergies :Flonase (Headache)  No Known Allergies      PAST MEDICAL & SURGICAL HISTORY:  Osteoporosis  BPH (benign prostatic hyperplasia)  Back pain  Osteoarthritis  History of back surgery      Medications:  MEDICATIONS  (STANDING):  ascorbic acid 500 milliGRAM(s) Oral daily  enoxaparin Injectable 40 milliGRAM(s) SubCutaneous daily  finasteride 5 milliGRAM(s) Oral daily  lactated ringers. 1000 milliLiter(s) (100 mL/Hr) IV Continuous <Continuous>  latanoprost 0.005% Ophthalmic Solution 1 Drop(s) Both EYES at bedtime  melatonin 3 milliGRAM(s) Oral at bedtime  morphine ER Tablet 200 milliGRAM(s) Oral daily  morphine ER Tablet 100 milliGRAM(s) Oral at bedtime  phenytoin   Capsule 400 milliGRAM(s) Oral at bedtime  senna 2 Tablet(s) Oral at bedtime  tamsulosin 0.8 milliGRAM(s) Oral at bedtime    MEDICATIONS  (PRN):  acetaminophen   Tablet .. 650 milliGRAM(s) Oral every 6 hours PRN Temp greater or equal to 38C (100.4F)  ALBUTerol    90 MICROgram(s) HFA Inhaler 2 Puff(s) Inhalation every 6 hours PRN Shortness of Breath and/or Wheezing  lactulose Syrup 10 Gram(s) Oral daily PRN constipation  lidocaine   Patch 1 Patch Transdermal every 24 hours PRN pain  morphine  - Injectable 4 milliGRAM(s) IV Push every 3 hours PRN Severe Pain (7 - 10)  ondansetron Injectable 4 milliGRAM(s) IV Push every 6 hours PRN Nausea and/or Vomiting  polyethylene glycol 3350 17 Gram(s) Oral daily PRN Constipation      LABS:                        10.9   9.44  )-----------( 188      ( 02 Mar 2020 06:44 )             33.7     03-02    138  |  104  |  13  ----------------------------<  111<H>  3.7   |  28  |  0.56    Ca    8.6      02 Mar 2020 06:44  Mg     1.7     03-02              PT/INR - ( 02 Mar 2020 06:44 )   PT: 15.3 sec;   INR: 1.35 ratio         PTT - ( 02 Mar 2020 06:44 )  PTT:29.4 sec      VITALS:  T(C): 36.8 (03-02-20 @ 05:26), Max: 37.7 (03-02-20 @ 00:01)  T(F): 98.3 (03-02-20 @ 05:26), Max: 99.8 (03-02-20 @ 00:01)  HR: 81 (03-02-20 @ 05:26) (74 - 95)  BP: 103/66 (03-02-20 @ 05:26) (101/65 - 114/63)  BP(mean): --  ABP: --  ABP(mean): --  RR: 14 (03-02-20 @ 05:26) (14 - 16)  SpO2: 93% (03-02-20 @ 05:26) (93% - 100%)  CVP(mm Hg): --  CVP(cm H2O): --    Ins and Outs     03-01-20 @ 07:01  -  03-02-20 @ 07:00  --------------------------------------------------------  IN: 3300 mL / OUT: 2075 mL / NET: 1225 mL        Height (cm): 175.26 (02-29-20 @ 15:08)  Weight (kg): 57.1 (02-29-20 @ 15:08)  BMI (kg/m2): 18.6 (02-29-20 @ 15:08)        I&O's Detail    01 Mar 2020 07:01  -  02 Mar 2020 07:00  --------------------------------------------------------  IN:    lactated ringers.: 2400 mL    Oral Fluid: 900 mL  Total IN: 3300 mL    OUT:    Voided: 2075 mL  Total OUT: 2075 mL    Total NET: 1225 mL

## 2020-03-02 NOTE — PROGRESS NOTE ADULT - SUBJECTIVE AND OBJECTIVE BOX
Patient is a 69y old  Male who presents with a chief complaint of left intertrochanteric fx (02 Mar 2020 12:01)      Patient seen and examined at bedside. No overnight events reported. pt reports pain on surgical site. no sob, cp, numbness/tingling.    ALLERGIES:  Flonase (Headache)  No Known Allergies    MEDICATIONS  (STANDING):  ascorbic acid 500 milliGRAM(s) Oral daily  enoxaparin Injectable 40 milliGRAM(s) SubCutaneous daily  finasteride 5 milliGRAM(s) Oral daily  lactated ringers. 1000 milliLiter(s) (100 mL/Hr) IV Continuous <Continuous>  latanoprost 0.005% Ophthalmic Solution 1 Drop(s) Both EYES at bedtime  melatonin 3 milliGRAM(s) Oral at bedtime  morphine ER Tablet 200 milliGRAM(s) Oral daily  morphine ER Tablet 100 milliGRAM(s) Oral at bedtime  phenytoin   Capsule 400 milliGRAM(s) Oral at bedtime  senna 2 Tablet(s) Oral at bedtime  tamsulosin 0.8 milliGRAM(s) Oral at bedtime    MEDICATIONS  (PRN):  acetaminophen   Tablet .. 650 milliGRAM(s) Oral every 6 hours PRN Temp greater or equal to 38C (100.4F)  ALBUTerol    90 MICROgram(s) HFA Inhaler 2 Puff(s) Inhalation every 6 hours PRN Shortness of Breath and/or Wheezing  lactulose Syrup 10 Gram(s) Oral daily PRN constipation  lidocaine   Patch 1 Patch Transdermal every 24 hours PRN pain  morphine  - Injectable 4 milliGRAM(s) IV Push every 3 hours PRN Severe Pain (7 - 10)  ondansetron Injectable 4 milliGRAM(s) IV Push every 6 hours PRN Nausea and/or Vomiting  polyethylene glycol 3350 17 Gram(s) Oral daily PRN Constipation    Vital Signs Last 24 Hrs  T(F): 98.3 (02 Mar 2020 05:26), Max: 99.8 (02 Mar 2020 00:01)  HR: 81 (02 Mar 2020 05:26) (74 - 95)  BP: 103/66 (02 Mar 2020 05:26) (101/65 - 114/63)  RR: 14 (02 Mar 2020 05:26) (14 - 16)  SpO2: 93% (02 Mar 2020 05:26) (93% - 100%)  I&O's Summary    01 Mar 2020 07:01  -  02 Mar 2020 07:00  --------------------------------------------------------  IN: 3300 mL / OUT: 2075 mL / NET: 1225 mL      GENERAL: NAD  HEAD:  Atraumatic, Normocephalic  EYES: EOMI, PERRLA, sclera clear  NECK: Supple  CHEST/LUNG: Clear to auscultation bilaterally; No wheeze  HEART: Regular rate and rhythm; No murmurs, rubs, or gallops  ABDOMEN: Soft, Nontender, Nondistended; Bowel sounds present  EXTREMITIES:  No clubbing, cyanosis. left hip surgical dressing c/d/i  NEUROLOGY: non-focal. aoox3  SKIN: No rashes     LABS:                        10.9   9.44  )-----------( 188      ( 02 Mar 2020 06:44 )             33.7     03-02    138  |  104  |  13  ----------------------------<  111  3.7   |  28  |  0.56    Ca    8.6      02 Mar 2020 06:44  Mg     1.7     03-02    TPro  8.2  /  Alb  3.9  /  TBili  0.5  /  DBili  x   /  AST  16  /  ALT  22  /  AlkPhos  136  02-28        eGFR if Non : 107 mL/min/1.73M2 (03-02-20 @ 06:44)  eGFR if : 124 mL/min/1.73M2 (03-02-20 @ 06:44)    PT/INR - ( 02 Mar 2020 06:44 )   PT: 15.3 sec;   INR: 1.35 ratio         PTT - ( 02 Mar 2020 06:44 )  PTT:29.4 sec        RADIOLOGY & ADDITIONAL TESTS:    Care Discussed with Consultants/Other Providers: d/w orthopedic PA

## 2020-03-02 NOTE — OCCUPATIONAL THERAPY INITIAL EVALUATION ADULT - ADDITIONAL COMMENTS
Pt lives in , [address], 10 steps to basement apartment. Mother and sister live upstairs, pt was assisting mother with medication management. Pt wears glasses, doesn't drive, occasionally used a cane in the community.  Pt reports recent LOB, falls past 2 months

## 2020-03-03 LAB
ANION GAP SERPL CALC-SCNC: 5 MMOL/L — SIGNIFICANT CHANGE UP (ref 5–17)
BASOPHILS # BLD AUTO: 0.04 K/UL — SIGNIFICANT CHANGE UP (ref 0–0.2)
BASOPHILS NFR BLD AUTO: 0.5 % — SIGNIFICANT CHANGE UP (ref 0–2)
BUN SERPL-MCNC: 14 MG/DL — SIGNIFICANT CHANGE UP (ref 7–23)
CALCIUM SERPL-MCNC: 8.7 MG/DL — SIGNIFICANT CHANGE UP (ref 8.4–10.5)
CHLORIDE SERPL-SCNC: 105 MMOL/L — SIGNIFICANT CHANGE UP (ref 96–108)
CO2 SERPL-SCNC: 30 MMOL/L — SIGNIFICANT CHANGE UP (ref 22–31)
CREAT SERPL-MCNC: 0.48 MG/DL — LOW (ref 0.5–1.3)
EOSINOPHIL # BLD AUTO: 0.21 K/UL — SIGNIFICANT CHANGE UP (ref 0–0.5)
EOSINOPHIL NFR BLD AUTO: 2.7 % — SIGNIFICANT CHANGE UP (ref 0–6)
GLUCOSE SERPL-MCNC: 111 MG/DL — HIGH (ref 70–99)
HCT VFR BLD CALC: 31.9 % — LOW (ref 39–50)
HGB BLD-MCNC: 10.5 G/DL — LOW (ref 13–17)
IMM GRANULOCYTES NFR BLD AUTO: 0.4 % — SIGNIFICANT CHANGE UP (ref 0–1.5)
LYMPHOCYTES # BLD AUTO: 1.29 K/UL — SIGNIFICANT CHANGE UP (ref 1–3.3)
LYMPHOCYTES # BLD AUTO: 16.5 % — SIGNIFICANT CHANGE UP (ref 13–44)
MCHC RBC-ENTMCNC: 31.8 PG — SIGNIFICANT CHANGE UP (ref 27–34)
MCHC RBC-ENTMCNC: 32.9 GM/DL — SIGNIFICANT CHANGE UP (ref 32–36)
MCV RBC AUTO: 96.7 FL — SIGNIFICANT CHANGE UP (ref 80–100)
MONOCYTES # BLD AUTO: 0.75 K/UL — SIGNIFICANT CHANGE UP (ref 0–0.9)
MONOCYTES NFR BLD AUTO: 9.6 % — SIGNIFICANT CHANGE UP (ref 2–14)
NEUTROPHILS # BLD AUTO: 5.48 K/UL — SIGNIFICANT CHANGE UP (ref 1.8–7.4)
NEUTROPHILS NFR BLD AUTO: 70.3 % — SIGNIFICANT CHANGE UP (ref 43–77)
NRBC # BLD: 0 /100 WBCS — SIGNIFICANT CHANGE UP (ref 0–0)
PLATELET # BLD AUTO: 181 K/UL — SIGNIFICANT CHANGE UP (ref 150–400)
POTASSIUM SERPL-MCNC: 4.2 MMOL/L — SIGNIFICANT CHANGE UP (ref 3.5–5.3)
POTASSIUM SERPL-SCNC: 4.2 MMOL/L — SIGNIFICANT CHANGE UP (ref 3.5–5.3)
RBC # BLD: 3.3 M/UL — LOW (ref 4.2–5.8)
RBC # FLD: 13.1 % — SIGNIFICANT CHANGE UP (ref 10.3–14.5)
SODIUM SERPL-SCNC: 140 MMOL/L — SIGNIFICANT CHANGE UP (ref 135–145)
WBC # BLD: 7.8 K/UL — SIGNIFICANT CHANGE UP (ref 3.8–10.5)
WBC # FLD AUTO: 7.8 K/UL — SIGNIFICANT CHANGE UP (ref 3.8–10.5)

## 2020-03-03 PROCEDURE — 93971 EXTREMITY STUDY: CPT | Mod: 26,LT

## 2020-03-03 PROCEDURE — 99233 SBSQ HOSP IP/OBS HIGH 50: CPT

## 2020-03-03 PROCEDURE — 76882 US LMTD JT/FCL EVL NVASC XTR: CPT | Mod: 26,59,LT

## 2020-03-03 RX ORDER — MIRABEGRON 50 MG/1
50 TABLET, EXTENDED RELEASE ORAL DAILY
Refills: 0 | Status: DISCONTINUED | OUTPATIENT
Start: 2020-03-03 | End: 2020-03-10

## 2020-03-03 RX ORDER — MORPHINE SULFATE 50 MG/1
2 CAPSULE, EXTENDED RELEASE ORAL ONCE
Refills: 0 | Status: DISCONTINUED | OUTPATIENT
Start: 2020-03-03 | End: 2020-03-03

## 2020-03-03 RX ADMIN — MORPHINE SULFATE 100 MILLIGRAM(S): 50 CAPSULE, EXTENDED RELEASE ORAL at 21:59

## 2020-03-03 RX ADMIN — TAMSULOSIN HYDROCHLORIDE 0.8 MILLIGRAM(S): 0.4 CAPSULE ORAL at 21:58

## 2020-03-03 RX ADMIN — MORPHINE SULFATE 200 MILLIGRAM(S): 50 CAPSULE, EXTENDED RELEASE ORAL at 12:07

## 2020-03-03 RX ADMIN — Medication 400 MILLIGRAM(S): at 21:58

## 2020-03-03 RX ADMIN — MORPHINE SULFATE 100 MILLIGRAM(S): 50 CAPSULE, EXTENDED RELEASE ORAL at 22:59

## 2020-03-03 RX ADMIN — ENOXAPARIN SODIUM 40 MILLIGRAM(S): 100 INJECTION SUBCUTANEOUS at 11:28

## 2020-03-03 RX ADMIN — LATANOPROST 1 DROP(S): 0.05 SOLUTION/ DROPS OPHTHALMIC; TOPICAL at 22:12

## 2020-03-03 RX ADMIN — SENNA PLUS 2 TABLET(S): 8.6 TABLET ORAL at 21:58

## 2020-03-03 RX ADMIN — MORPHINE SULFATE 200 MILLIGRAM(S): 50 CAPSULE, EXTENDED RELEASE ORAL at 13:27

## 2020-03-03 RX ADMIN — MORPHINE SULFATE 2 MILLIGRAM(S): 50 CAPSULE, EXTENDED RELEASE ORAL at 10:13

## 2020-03-03 RX ADMIN — MORPHINE SULFATE 4 MILLIGRAM(S): 50 CAPSULE, EXTENDED RELEASE ORAL at 16:41

## 2020-03-03 RX ADMIN — MORPHINE SULFATE 2 MILLIGRAM(S): 50 CAPSULE, EXTENDED RELEASE ORAL at 10:26

## 2020-03-03 RX ADMIN — Medication 3 MILLIGRAM(S): at 21:59

## 2020-03-03 RX ADMIN — Medication 500 MILLIGRAM(S): at 11:29

## 2020-03-03 RX ADMIN — FINASTERIDE 5 MILLIGRAM(S): 5 TABLET, FILM COATED ORAL at 11:29

## 2020-03-03 NOTE — PROGRESS NOTE ADULT - SUBJECTIVE AND OBJECTIVE BOX
Patient is a 69y old  Male who presents with a chief complaint of left intertrochanteric fx (02 Mar 2020 16:26)      Patient seen and examined at bedside. No overnight events reported. Pt reports pain controlled.    ALLERGIES:  Flonase (Headache)  No Known Allergies    MEDICATIONS  (STANDING):  ascorbic acid 500 milliGRAM(s) Oral daily  enoxaparin Injectable 40 milliGRAM(s) SubCutaneous daily  finasteride 5 milliGRAM(s) Oral daily  lactated ringers. 1000 milliLiter(s) (100 mL/Hr) IV Continuous <Continuous>  latanoprost 0.005% Ophthalmic Solution 1 Drop(s) Both EYES at bedtime  melatonin 3 milliGRAM(s) Oral at bedtime  morphine ER Tablet 200 milliGRAM(s) Oral daily  morphine ER Tablet 100 milliGRAM(s) Oral at bedtime  phenytoin   Capsule 400 milliGRAM(s) Oral at bedtime  senna 2 Tablet(s) Oral at bedtime  tamsulosin 0.8 milliGRAM(s) Oral at bedtime    MEDICATIONS  (PRN):  acetaminophen   Tablet .. 650 milliGRAM(s) Oral every 6 hours PRN Temp greater or equal to 38C (100.4F)  ALBUTerol    90 MICROgram(s) HFA Inhaler 2 Puff(s) Inhalation every 6 hours PRN Shortness of Breath and/or Wheezing  lactulose Syrup 10 Gram(s) Oral daily PRN constipation  lidocaine   Patch 1 Patch Transdermal every 24 hours PRN pain  morphine  - Injectable 4 milliGRAM(s) IV Push every 3 hours PRN Severe Pain (7 - 10)  ondansetron Injectable 4 milliGRAM(s) IV Push every 6 hours PRN Nausea and/or Vomiting  polyethylene glycol 3350 17 Gram(s) Oral daily PRN Constipation    Vital Signs Last 24 Hrs  T(F): 98.6 (03 Mar 2020 09:37), Max: 99.8 (02 Mar 2020 21:32)  HR: 89 (03 Mar 2020 09:37) (85 - 89)  BP: 104/51 (03 Mar 2020 09:37) (96/47 - 106/57)  RR: 17 (03 Mar 2020 09:37) (14 - 17)  SpO2: 94% (03 Mar 2020 09:37) (91% - 94%)  I&O's Summary    02 Mar 2020 07:01  -  03 Mar 2020 07:00  --------------------------------------------------------  IN: 300 mL / OUT: 1600 mL / NET: -1300 mL      GENERAL: NAD  HEAD:  Atraumatic, Normocephalic  EYES: EOMI, PERRLA, sclera clear  NECK: Supple  CHEST/LUNG: Clear to auscultation bilaterally; No wheeze  HEART: Regular rate and rhythm; No murmurs  ABDOMEN: Soft, Nontender, Nondistended; Bowel sounds present  EXTREMITIES: No clubbing, cyanosis. left thigh surgical dressing  PSYCH: AAOx3  NEUROLOGY: non-focal  SKIN: No rashes    LABS:                        10.5   7.80  )-----------( 181      ( 03 Mar 2020 05:54 )             31.9     03-03    140  |  105  |  14  ----------------------------<  111  4.2   |  30  |  0.48    Ca    8.7      03 Mar 2020 05:54  Mg     1.7     03-02        eGFR if Non African American: 114 mL/min/1.73M2 (03-03-20 @ 05:54)  eGFR if African American: 132 mL/min/1.73M2 (03-03-20 @ 05:54)    PT/INR - ( 02 Mar 2020 06:44 )   PT: 15.3 sec;   INR: 1.35 ratio         PTT - ( 02 Mar 2020 06:44 )  PTT:29.4 sec          RADIOLOGY & ADDITIONAL TESTS:    Care Discussed with Consultants/Other Providers: Patient is a 69y old  Male who presents with a chief complaint of left intertrochanteric fx (02 Mar 2020 16:26)      Patient seen and examined at bedside. No overnight events reported. Pt reports pain controlled.    ALLERGIES:  Flonase (Headache)  No Known Allergies    MEDICATIONS  (STANDING):  ascorbic acid 500 milliGRAM(s) Oral daily  enoxaparin Injectable 40 milliGRAM(s) SubCutaneous daily  finasteride 5 milliGRAM(s) Oral daily  lactated ringers. 1000 milliLiter(s) (100 mL/Hr) IV Continuous <Continuous>  latanoprost 0.005% Ophthalmic Solution 1 Drop(s) Both EYES at bedtime  melatonin 3 milliGRAM(s) Oral at bedtime  morphine ER Tablet 200 milliGRAM(s) Oral daily  morphine ER Tablet 100 milliGRAM(s) Oral at bedtime  phenytoin   Capsule 400 milliGRAM(s) Oral at bedtime  senna 2 Tablet(s) Oral at bedtime  tamsulosin 0.8 milliGRAM(s) Oral at bedtime    MEDICATIONS  (PRN):  acetaminophen   Tablet .. 650 milliGRAM(s) Oral every 6 hours PRN Temp greater or equal to 38C (100.4F)  ALBUTerol    90 MICROgram(s) HFA Inhaler 2 Puff(s) Inhalation every 6 hours PRN Shortness of Breath and/or Wheezing  lactulose Syrup 10 Gram(s) Oral daily PRN constipation  lidocaine   Patch 1 Patch Transdermal every 24 hours PRN pain  morphine  - Injectable 4 milliGRAM(s) IV Push every 3 hours PRN Severe Pain (7 - 10)  ondansetron Injectable 4 milliGRAM(s) IV Push every 6 hours PRN Nausea and/or Vomiting  polyethylene glycol 3350 17 Gram(s) Oral daily PRN Constipation    Vital Signs Last 24 Hrs  T(F): 98.6 (03 Mar 2020 09:37), Max: 99.8 (02 Mar 2020 21:32)  HR: 89 (03 Mar 2020 09:37) (85 - 89)  BP: 104/51 (03 Mar 2020 09:37) (96/47 - 106/57)  RR: 17 (03 Mar 2020 09:37) (14 - 17)  SpO2: 94% (03 Mar 2020 09:37) (91% - 94%)  I&O's Summary    02 Mar 2020 07:01  -  03 Mar 2020 07:00  --------------------------------------------------------  IN: 300 mL / OUT: 1600 mL / NET: -1300 mL      GENERAL: NAD  HEAD:  Atraumatic, Normocephalic  EYES: EOMI, PERRLA, sclera clear  NECK: Supple  CHEST/LUNG: Clear to auscultation bilaterally; No wheeze  HEART: Regular rate and rhythm; No murmurs  ABDOMEN: Soft, Nontender, Nondistended; Bowel sounds present  EXTREMITIES: No clubbing, cyanosis. left thigh surgical dressing. left knee swelling, no erythema or pain on palpation   PSYCH: AAOx3  NEUROLOGY: non-focal  SKIN: No rashes    LABS:                        10.5   7.80  )-----------( 181      ( 03 Mar 2020 05:54 )             31.9     03-03    140  |  105  |  14  ----------------------------<  111  4.2   |  30  |  0.48    Ca    8.7      03 Mar 2020 05:54  Mg     1.7     03-02        eGFR if Non African American: 114 mL/min/1.73M2 (03-03-20 @ 05:54)  eGFR if African American: 132 mL/min/1.73M2 (03-03-20 @ 05:54)    PT/INR - ( 02 Mar 2020 06:44 )   PT: 15.3 sec;   INR: 1.35 ratio         PTT - ( 02 Mar 2020 06:44 )  PTT:29.4 sec          RADIOLOGY & ADDITIONAL TESTS:    Care Discussed with Consultants/Other Providers: d/w orthopedics MAXIMILIAN Hendricks Patient is a 69y old  Male who presents with a chief complaint of left intertrochanteric fx (02 Mar 2020 16:26)      Patient seen and examined at bedside. No overnight events reported. Pt reports pain controlled.    ALLERGIES:  Flonase (Headache)  No Known Allergies    MEDICATIONS  (STANDING):  ascorbic acid 500 milliGRAM(s) Oral daily  enoxaparin Injectable 40 milliGRAM(s) SubCutaneous daily  finasteride 5 milliGRAM(s) Oral daily  lactated ringers. 1000 milliLiter(s) (100 mL/Hr) IV Continuous <Continuous>  latanoprost 0.005% Ophthalmic Solution 1 Drop(s) Both EYES at bedtime  melatonin 3 milliGRAM(s) Oral at bedtime  morphine ER Tablet 200 milliGRAM(s) Oral daily  morphine ER Tablet 100 milliGRAM(s) Oral at bedtime  phenytoin   Capsule 400 milliGRAM(s) Oral at bedtime  senna 2 Tablet(s) Oral at bedtime  tamsulosin 0.8 milliGRAM(s) Oral at bedtime    MEDICATIONS  (PRN):  acetaminophen   Tablet .. 650 milliGRAM(s) Oral every 6 hours PRN Temp greater or equal to 38C (100.4F)  ALBUTerol    90 MICROgram(s) HFA Inhaler 2 Puff(s) Inhalation every 6 hours PRN Shortness of Breath and/or Wheezing  lactulose Syrup 10 Gram(s) Oral daily PRN constipation  lidocaine   Patch 1 Patch Transdermal every 24 hours PRN pain  morphine  - Injectable 4 milliGRAM(s) IV Push every 3 hours PRN Severe Pain (7 - 10)  ondansetron Injectable 4 milliGRAM(s) IV Push every 6 hours PRN Nausea and/or Vomiting  polyethylene glycol 3350 17 Gram(s) Oral daily PRN Constipation    Vital Signs Last 24 Hrs  T(F): 98.6 (03 Mar 2020 09:37), Max: 99.8 (02 Mar 2020 21:32)  HR: 89 (03 Mar 2020 09:37) (85 - 89)  BP: 104/51 (03 Mar 2020 09:37) (96/47 - 106/57)  RR: 17 (03 Mar 2020 09:37) (14 - 17)  SpO2: 94% (03 Mar 2020 09:37) (91% - 94%)  I&O's Summary    02 Mar 2020 07:01  -  03 Mar 2020 07:00  --------------------------------------------------------  IN: 300 mL / OUT: 1600 mL / NET: -1300 mL      GENERAL: NAD  HEAD:  Atraumatic, Normocephalic  EYES: EOMI, PERRLA, sclera clear  NECK: Supple  CHEST/LUNG: Clear to auscultation bilaterally; No wheeze  HEART: Regular rate and rhythm; No murmurs  ABDOMEN: Soft, Nontender, Nondistended; Bowel sounds present  EXTREMITIES: No clubbing, cyanosis. left thigh surgical dressing. left knee swelling with increased warmth, no erythema or pain on palpation   PSYCH: AAOx3  NEUROLOGY: non-focal  SKIN: No rashes    LABS:                        10.5   7.80  )-----------( 181      ( 03 Mar 2020 05:54 )             31.9     03-03    140  |  105  |  14  ----------------------------<  111  4.2   |  30  |  0.48    Ca    8.7      03 Mar 2020 05:54  Mg     1.7     03-02        eGFR if Non African American: 114 mL/min/1.73M2 (03-03-20 @ 05:54)  eGFR if African American: 132 mL/min/1.73M2 (03-03-20 @ 05:54)    PT/INR - ( 02 Mar 2020 06:44 )   PT: 15.3 sec;   INR: 1.35 ratio         PTT - ( 02 Mar 2020 06:44 )  PTT:29.4 sec          RADIOLOGY & ADDITIONAL TESTS:    Care Discussed with Consultants/Other Providers: d/w orthopedics MAXIMILIAN Hendricks

## 2020-03-03 NOTE — CHART NOTE - NSCHARTNOTEFT_GEN_A_CORE
Chart reviewed.   Patient requiring mod A with PT. Patient continues to have difficulty with left hip flexion secondary to pain.   Will visit patient tomorrow for final follow up and functional exam.   Patient encouraged to continue to participate with therapist at bedside.   Rehab attending agrees with plan.

## 2020-03-03 NOTE — PROGRESS NOTE ADULT - SUBJECTIVE AND OBJECTIVE BOX
Follow-up Pulmonary Progress Note  Chief Complaint : Fracture of unspecified part of neck of left femur, initial encounter for closed fracture      stat 94-99 % RA  feels well  no cp,sob, palp, n/v      Allergies :Flonase (Headache)  No Known Allergies      PAST MEDICAL & SURGICAL HISTORY:  Osteoporosis  BPH (benign prostatic hyperplasia)  Back pain  Osteoarthritis  History of back surgery      Medications:  MEDICATIONS  (STANDING):  ascorbic acid 500 milliGRAM(s) Oral daily  enoxaparin Injectable 40 milliGRAM(s) SubCutaneous daily  finasteride 5 milliGRAM(s) Oral daily  lactated ringers. 1000 milliLiter(s) (100 mL/Hr) IV Continuous <Continuous>  latanoprost 0.005% Ophthalmic Solution 1 Drop(s) Both EYES at bedtime  melatonin 3 milliGRAM(s) Oral at bedtime  morphine ER Tablet 200 milliGRAM(s) Oral daily  morphine ER Tablet 100 milliGRAM(s) Oral at bedtime  phenytoin   Capsule 400 milliGRAM(s) Oral at bedtime  senna 2 Tablet(s) Oral at bedtime  tamsulosin 0.8 milliGRAM(s) Oral at bedtime    MEDICATIONS  (PRN):  acetaminophen   Tablet .. 650 milliGRAM(s) Oral every 6 hours PRN Temp greater or equal to 38C (100.4F)  ALBUTerol    90 MICROgram(s) HFA Inhaler 2 Puff(s) Inhalation every 6 hours PRN Shortness of Breath and/or Wheezing  lactulose Syrup 10 Gram(s) Oral daily PRN constipation  lidocaine   Patch 1 Patch Transdermal every 24 hours PRN pain  morphine  - Injectable 4 milliGRAM(s) IV Push every 3 hours PRN Severe Pain (7 - 10)  ondansetron Injectable 4 milliGRAM(s) IV Push every 6 hours PRN Nausea and/or Vomiting  polyethylene glycol 3350 17 Gram(s) Oral daily PRN Constipation      LABS:                        10.5   7.80  )-----------( 181      ( 03 Mar 2020 05:54 )             31.9     03-03    140  |  105  |  14  ----------------------------<  111<H>  4.2   |  30  |  0.48<L>    Ca    8.7      03 Mar 2020 05:54  Mg     1.7     03-02              PT/INR - ( 02 Mar 2020 06:44 )   PT: 15.3 sec;   INR: 1.35 ratio         PTT - ( 02 Mar 2020 06:44 )  PTT:29.4 sec      VITALS:  T(C): 37 (03-03-20 @ 09:37), Max: 37.7 (03-02-20 @ 21:32)  T(F): 98.6 (03-03-20 @ 09:37), Max: 99.8 (03-02-20 @ 21:32)  HR: 89 (03-03-20 @ 09:37) (85 - 89)  BP: 104/51 (03-03-20 @ 09:37) (96/47 - 106/57)  BP(mean): --  ABP: --  ABP(mean): --  RR: 17 (03-03-20 @ 09:37) (14 - 17)  SpO2: 94% (03-03-20 @ 09:37) (91% - 94%)  CVP(mm Hg): --  CVP(cm H2O): --    Ins and Outs     03-02-20 @ 07:01  -  03-03-20 @ 07:00  --------------------------------------------------------  IN: 300 mL / OUT: 1600 mL / NET: -1300 mL    03-03-20 @ 07:01  -  03-03-20 @ 12:17  --------------------------------------------------------  IN: 200 mL / OUT: 600 mL / NET: -400 mL        Height (cm): 175.26 (02-29-20 @ 15:08)  Weight (kg): 57.1 (02-29-20 @ 15:08)  BMI (kg/m2): 18.6 (02-29-20 @ 15:08)        I&O's Detail    02 Mar 2020 07:01  -  03 Mar 2020 07:00  --------------------------------------------------------  IN:    Oral Fluid: 300 mL  Total IN: 300 mL    OUT:    Voided: 1600 mL  Total OUT: 1600 mL    Total NET: -1300 mL      03 Mar 2020 07:01  -  03 Mar 2020 12:17  --------------------------------------------------------  IN:    Oral Fluid: 200 mL  Total IN: 200 mL    OUT:    Voided: 600 mL  Total OUT: 600 mL    Total NET: -400 mL

## 2020-03-03 NOTE — PROGRESS NOTE ADULT - SUBJECTIVE AND OBJECTIVE BOX
70 yo male POD # 3 s/p left [facility]  no events noted over night  pt c/o left hip pain, denies n/v/f/c  tolerating po, voiding on his own    Vital Signs Last 24 Hrs  T(C): 37 (03 Mar 2020 09:37), Max: 37.7 (02 Mar 2020 21:32)  T(F): 98.6 (03 Mar 2020 09:37), Max: 99.8 (02 Mar 2020 21:32)  HR: 89 (03 Mar 2020 09:37) (85 - 89)  BP: 104/51 (03 Mar 2020 09:37) (96/47 - 106/57)  RR: 17 (03 Mar 2020 09:37) (14 - 17)  SpO2: 94% (03 Mar 2020 09:37) (91% - 94%)    pt seen and examined at bedside  a+ox3, nad, non toxic  nc/at, PERRL  abd - soft, nd, nttp  RLE - strength 5/5, sensation intact, good dorsi/plantarflexion  LLE - thigh slightly tense, no ecchymoses, dressing intact, mild erythema of skin from thigh to knee, soft calf, good dorsi/plantarflexion, foot is warm with good cap refill, left knee appears larger as compared to right, nttp, pt might have chronic effusion?   - pt voiding on his own                          10.5   7.80  )-----------( 181      ( 03 Mar 2020 05:54 )             31.9     03-03    140  |  105  |  14  ----------------------------<  111<H>  4.2   |  30  |  0.48<L>    Ca    8.7      03 Mar 2020 05:54  Mg     1.7     03-02 70 yo male POD # 3 s/p left [facility]  no events noted over night  pt c/o left hip pain, denies n/v/f/c  tolerating po, voiding on his own    Vital Signs Last 24 Hrs  T(C): 37 (03 Mar 2020 09:37), Max: 37.7 (02 Mar 2020 21:32)  T(F): 98.6 (03 Mar 2020 09:37), Max: 99.8 (02 Mar 2020 21:32)  HR: 89 (03 Mar 2020 09:37) (85 - 89)  BP: 104/51 (03 Mar 2020 09:37) (96/47 - 106/57)  RR: 17 (03 Mar 2020 09:37) (14 - 17)  SpO2: 94% (03 Mar 2020 09:37) (91% - 94%)    pt seen and examined at bedside  a+ox3, nad, non toxic  nc/at, PERRL  abd - soft, nd, nttp  RLE - strength 5/5, sensation intact, good dorsi/plantarflexion  LLE - thigh slightly tense, no ecchymoses, dressing intact, mild erythema of skin from thigh to knee, soft calf, good dorsi/plantarflexion, foot is warm with good cap refill, left knee appears larger, with palpable fluid laterally, as compared to right, nttp, pt might have chronic effusion?   - pt voiding on his own                          10.5   7.80  )-----------( 181      ( 03 Mar 2020 05:54 )             31.9     03-03    140  |  105  |  14  ----------------------------<  111<H>  4.2   |  30  |  0.48<L>    Ca    8.7      03 Mar 2020 05:54  Mg     1.7     03-02

## 2020-03-03 NOTE — PROGRESS NOTE ADULT - ASSESSMENT
70 yo male POD # 3 s/p left [facility]  no events noted over night  pt c/o left hip pain, denies n/v/f/c  tolerating po, voiding on his own    H/H stabilized    pt stable from surgical standpoint    plan  - DVT prophylaxis  - WBAT with PT   - analgesia prn  - incentive spirometry  - awaiting rehab placement 70 yo male POD # 3 s/p left [facility]  no events noted over night  pt c/o left hip pain, denies n/v/f/c  tolerating po, voiding on his own    H/H stabilized    pt stable from surgical standpoint    plan  - left knee x-rays  -DVT prophylaxis  - WBAT with PT   - analgesia prn  - incentive spirometry  - awaiting rehab placement

## 2020-03-03 NOTE — PROGRESS NOTE ADULT - ASSESSMENT
Physical Examination:  GENERAL:               Alert, Oriented, No acute distress.    HEENT:                   No JVD, Moist MM  PULM:                     Bilateral air entry, Clear to auscultation bilaterally, no significant sputum production, No Rales, No Rhonchi, No Wheezing  CVS:                         S1, S2,  No Murmur  ABD:                        Soft, nondistended, nontender, normoactive bowel sounds,   NEURO:                  Alert, oriented, interactive, nonfocal, follows commands  PSYC:                      Calm, + Insight.    Assessment  1. Chronic COPD likely as Ex smoker- suspect Mild/Asymptomatic  2. MARY GRACE - not tolerated cpap / bipap  3. Mechanical fall with Left hip fx  4. Underlying Osteoporosis, BPH (benign prostatic hyperplasia), Back pain, Osteoarthritis    Plan  - Incentive spirometry  - carol PRN  - OOB, PT  - can d/c continuos pulse ox, d/c o2    - Dispo planning appropriate from pulmonary perspective

## 2020-03-03 NOTE — PROGRESS NOTE ADULT - ASSESSMENT
Pt is a 68yo M admitted to [facility] s/p with left intertrochanteric fx.     * left intertrochanteric fx s/p mechanical fall  s/p ORIF POD 3  pain management as per ortho  ortho consult appreciated   PT/OT eval  PM&R consult: re-evaluation today    *COPD - stable  *MARY GRACE - not tolerated CPAP/biapp  seen on CXR   pulm consult appreciated  nebs prn, incentive spirometry     *BPH  cont Finasteride, tamsulosin     *Hep C  follow up out pt GI     *osteoporosis  Alendronate 70mg q week hold    * seizure disorder   - phenytoin qHS     *Chronic pain syndrome  - Takes PO morphine sulfate ER 200mg in morning, morphine sulfate IR 60mg in afternoon, and morphine sulfate ER 100mg at night   - Will keep standing morning and evening doses  - Confirmed ISTOPP Reference #: [id number]     * BPH  -finasteride and BPH    *DVT ppx   Lovenox subq    DISPO: pending PM&R consult Pt is a 70yo M admitted to [facility] s/p with left intertrochanteric fx.     * left intertrochanteric fx s/p mechanical fall  s/p ORIF POD 3  pain management as per ortho  ortho consult appreciated   PT/OT eval  PM&R consult: re-evaluation today    * Left knee swelling. r/o bursitis   - consider left knee US  - follow up Left knee X ray    *COPD - stable  *MARY GRACE - not tolerated CPAP/biapp  seen on CXR   pulm consult appreciated  nebs prn, incentive spirometry     *BPH  cont Finasteride, tamsulosin     *Hep C  follow up out pt GI     *osteoporosis  Alendronate 70mg q week hold    * seizure disorder   - phenytoin qHS     *Chronic pain syndrome  - Takes PO morphine sulfate ER 200mg in morning, morphine sulfate IR 60mg in afternoon, and morphine sulfate ER 100mg at night   - Will keep standing morning and evening doses  - Confirmed ISTOPP Reference #: [id number]     * BPH  -finasteride and BPH    *DVT ppx   Lovenox subq    DISPO: pending PM&R consult Pt is a 70yo M admitted to [facility] s/p with left intertrochanteric fx.     * left intertrochanteric fx s/p mechanical fall  s/p ORIF POD 3  pain management as per ortho  ortho consult appreciated   PT/OT eval  PM&R consult: re-evaluation today    * Left knee swelling/induration. r/o bursitis   - follow up left knee US  - follow up Left knee X ray    *COPD - stable  *MARY GRACE - not tolerated CPAP/biapp  seen on CXR   pulm consult appreciated  nebs prn, incentive spirometry     *BPH  cont Finasteride, tamsulosin     *Hep C  follow up out pt GI     *osteoporosis  Alendronate 70mg q week hold    * seizure disorder   - phenytoin qHS     *Chronic pain syndrome  - Takes PO morphine sulfate ER 200mg in morning, morphine sulfate IR 60mg in afternoon, and morphine sulfate ER 100mg at night   - Will keep standing morning and evening doses  - Confirmed ISTOPP Reference #: [id number]     * BPH  -finasteride and BPH    *DVT ppx   Lovenox subq    DISPO: pending PM&R consult Pt is a 70yo M admitted to [facility] s/p with left intertrochanteric fx.     * left intertrochanteric fx s/p mechanical fall  s/p ORIF POD 3  pain management as per ortho  ortho consult appreciated   PT/OT eval  PM&R consult: re-evaluation today    * Left knee swelling/induration. r/o bursitis r/o gout   - follow up left knee US  - follow up Left knee X ray  - follow up uric acid   - will consider rheumatology consult     *COPD - stable  *MARY GRACE - not tolerated CPAP/biapp  seen on CXR   pulm consult appreciated  nebs prn, incentive spirometry     *BPH  cont Finasteride, tamsulosin     *Hep C  follow up out pt GI     *osteoporosis  Alendronate 70mg q week hold    * seizure disorder   - phenytoin qHS     *Chronic pain syndrome  - Takes PO morphine sulfate ER 200mg in morning, morphine sulfate IR 60mg in afternoon, and morphine sulfate ER 100mg at night   - Will keep standing morning and evening doses  - Confirmed ISTOPP Reference #: [id number]     * BPH  -finasteride and BPH    *DVT ppx   Lovenox subq    DISPO: pending PM&R consult

## 2020-03-04 PROCEDURE — 99232 SBSQ HOSP IP/OBS MODERATE 35: CPT

## 2020-03-04 PROCEDURE — 99233 SBSQ HOSP IP/OBS HIGH 50: CPT

## 2020-03-04 PROCEDURE — 73562 X-RAY EXAM OF KNEE 3: CPT | Mod: 26,LT

## 2020-03-04 RX ORDER — ENOXAPARIN SODIUM 100 MG/ML
60 INJECTION SUBCUTANEOUS EVERY 12 HOURS
Refills: 0 | Status: DISCONTINUED | OUTPATIENT
Start: 2020-03-04 | End: 2020-03-05

## 2020-03-04 RX ORDER — POLYETHYLENE GLYCOL 3350 17 G/17G
17 POWDER, FOR SOLUTION ORAL DAILY
Refills: 0 | Status: DISCONTINUED | OUTPATIENT
Start: 2020-03-04 | End: 2020-03-10

## 2020-03-04 RX ADMIN — LACTULOSE 10 GRAM(S): 10 SOLUTION ORAL at 11:19

## 2020-03-04 RX ADMIN — ENOXAPARIN SODIUM 60 MILLIGRAM(S): 100 INJECTION SUBCUTANEOUS at 06:00

## 2020-03-04 RX ADMIN — MORPHINE SULFATE 100 MILLIGRAM(S): 50 CAPSULE, EXTENDED RELEASE ORAL at 22:31

## 2020-03-04 RX ADMIN — Medication 500 MILLIGRAM(S): at 11:22

## 2020-03-04 RX ADMIN — Medication 400 MILLIGRAM(S): at 22:04

## 2020-03-04 RX ADMIN — MIRABEGRON 50 MILLIGRAM(S): 50 TABLET, EXTENDED RELEASE ORAL at 11:19

## 2020-03-04 RX ADMIN — MORPHINE SULFATE 200 MILLIGRAM(S): 50 CAPSULE, EXTENDED RELEASE ORAL at 11:19

## 2020-03-04 RX ADMIN — MORPHINE SULFATE 4 MILLIGRAM(S): 50 CAPSULE, EXTENDED RELEASE ORAL at 09:45

## 2020-03-04 RX ADMIN — MORPHINE SULFATE 4 MILLIGRAM(S): 50 CAPSULE, EXTENDED RELEASE ORAL at 09:29

## 2020-03-04 RX ADMIN — Medication 3 MILLIGRAM(S): at 22:03

## 2020-03-04 RX ADMIN — TAMSULOSIN HYDROCHLORIDE 0.8 MILLIGRAM(S): 0.4 CAPSULE ORAL at 22:03

## 2020-03-04 RX ADMIN — ENOXAPARIN SODIUM 60 MILLIGRAM(S): 100 INJECTION SUBCUTANEOUS at 16:29

## 2020-03-04 RX ADMIN — MORPHINE SULFATE 200 MILLIGRAM(S): 50 CAPSULE, EXTENDED RELEASE ORAL at 12:18

## 2020-03-04 RX ADMIN — SENNA PLUS 2 TABLET(S): 8.6 TABLET ORAL at 22:03

## 2020-03-04 RX ADMIN — POLYETHYLENE GLYCOL 3350 17 GRAM(S): 17 POWDER, FOR SOLUTION ORAL at 14:29

## 2020-03-04 RX ADMIN — LATANOPROST 1 DROP(S): 0.05 SOLUTION/ DROPS OPHTHALMIC; TOPICAL at 22:02

## 2020-03-04 RX ADMIN — MORPHINE SULFATE 100 MILLIGRAM(S): 50 CAPSULE, EXTENDED RELEASE ORAL at 22:01

## 2020-03-04 RX ADMIN — FINASTERIDE 5 MILLIGRAM(S): 5 TABLET, FILM COATED ORAL at 11:19

## 2020-03-04 NOTE — PROGRESS NOTE ADULT - SUBJECTIVE AND OBJECTIVE BOX
Follow-up Pulmonary Progress Note  Chief Complaint : Fracture of unspecified part of neck of left femur, initial encounter for closed fracture      pt feels well  no cp, sob, palp, n/v  c/o constipation    noted Left knee swelling yesterday, had US found to have new DVT.     Allergies :Flonase (Headache)  No Known Allergies      PAST MEDICAL & SURGICAL HISTORY:  Osteoporosis  BPH (benign prostatic hyperplasia)  Back pain  Osteoarthritis  History of back surgery      Medications:  MEDICATIONS  (STANDING):  ascorbic acid 500 milliGRAM(s) Oral daily  enoxaparin Injectable 60 milliGRAM(s) SubCutaneous every 12 hours  finasteride 5 milliGRAM(s) Oral daily  latanoprost 0.005% Ophthalmic Solution 1 Drop(s) Both EYES at bedtime  melatonin 3 milliGRAM(s) Oral at bedtime  mirabegron ER 50 milliGRAM(s) Oral daily  morphine ER Tablet 200 milliGRAM(s) Oral daily  morphine ER Tablet 100 milliGRAM(s) Oral at bedtime  phenytoin   Capsule 400 milliGRAM(s) Oral at bedtime  senna 2 Tablet(s) Oral at bedtime  tamsulosin 0.8 milliGRAM(s) Oral at bedtime    MEDICATIONS  (PRN):  acetaminophen   Tablet .. 650 milliGRAM(s) Oral every 6 hours PRN Temp greater or equal to 38C (100.4F)  ALBUTerol    90 MICROgram(s) HFA Inhaler 2 Puff(s) Inhalation every 6 hours PRN Shortness of Breath and/or Wheezing  lactulose Syrup 10 Gram(s) Oral daily PRN constipation  lidocaine   Patch 1 Patch Transdermal every 24 hours PRN pain  morphine  - Injectable 4 milliGRAM(s) IV Push every 3 hours PRN Severe Pain (7 - 10)  ondansetron Injectable 4 milliGRAM(s) IV Push every 6 hours PRN Nausea and/or Vomiting  polyethylene glycol 3350 17 Gram(s) Oral daily PRN Constipation      LABS:                        10.5   7.80  )-----------( 181      ( 03 Mar 2020 05:54 )             31.9     03-03    140  |  105  |  14  ----------------------------<  111<H>  4.2   |  30  |  0.48<L>    Ca    8.7      03 Mar 2020 05:54      RADIOLOGY  < from: US Duplex Venous Lower Ext Ltd, Left (03.03.20 @ 20:30) >    IMPRESSION:     Acute deep vein thrombosis from the left upper femoral vein to the popliteal vein and into the calf veins confirmed as previously reported.    < end of copied text >    VITALS:  T(C): 36.9 (03-04-20 @ 11:00), Max: 37.6 (03-03-20 @ 15:37)  T(F): 98.4 (03-04-20 @ 11:00), Max: 99.6 (03-03-20 @ 15:37)  HR: 108 (03-04-20 @ 11:00) (87 - 108)  BP: 97/68 (03-04-20 @ 11:00) (90/62 - 116/73)  BP(mean): --  ABP: --  ABP(mean): --  RR: 20 (03-04-20 @ 11:00) (15 - 20)  SpO2: 95% (03-04-20 @ 11:00) (91% - 95%)  CVP(mm Hg): --  CVP(cm H2O): --    Ins and Outs     03-03-20 @ 07:01  -  03-04-20 @ 07:00  --------------------------------------------------------  IN: 750 mL / OUT: 2750 mL / NET: -2000 mL    03-04-20 @ 07:01  -  03-04-20 @ 12:23  --------------------------------------------------------  IN: 360 mL / OUT: 400 mL / NET: -40 mL                I&O's Detail    03 Mar 2020 07:01  -  04 Mar 2020 07:00  --------------------------------------------------------  IN:    Oral Fluid: 750 mL  Total IN: 750 mL    OUT:    Voided: 2750 mL  Total OUT: 2750 mL    Total NET: -2000 mL      04 Mar 2020 07:01  -  04 Mar 2020 12:23  --------------------------------------------------------  IN:    Oral Fluid: 360 mL  Total IN: 360 mL    OUT:    Voided: 400 mL  Total OUT: 400 mL    Total NET: -40 mL

## 2020-03-04 NOTE — PROGRESS NOTE ADULT - ATTENDING COMMENTS
X-ray left knee reviewed which shows calcified menisci and moderate DJD.  Clinically has mild effusion.  Advise ice packs PRN and continued PT as tolerated

## 2020-03-04 NOTE — PROGRESS NOTE ADULT - ASSESSMENT
Pt is a 68yo M admitted to [facility] s/p with left intertrochanteric fx.     * left intertrochanteric fx s/p mechanical fall  s/p ORIF POD 4  pain management as per ortho  ortho consult appreciated   PT/OT eval  PM&R consult: re-evaluation today but most likely CHRIS    # LLE DVT  - venous doppler: left femoral vein to popiteal and calf veins  - Lovenox 60mg q12  - if no new procedures planned, switch to PO AC     * Left knee swelling/induration.   - no history of gout. minimal pain at rest. effusion possibly from inflammation from recent fall/trauma  - sono left knee showed moderate joint effusion. left knee swelling improved. spoke to orthopedics to consider arthrocentesis  - follow up Left knee X ray    *COPD - stable  *MARY GRACE - not tolerated CPAP/biapp  seen on CXR   pulm consult appreciated  nebs prn, incentive spirometry     *BPH  cont Finasteride, tamsulosin     *Hep C  - hep c RNA non-detectable  follow up out pt GI     *osteoporosis  Alendronate 70mg q week hold    * seizure disorder   - phenytoin qHS     *Chronic pain syndrome  - Takes PO morphine sulfate ER 200mg in morning, morphine sulfate IR 60mg in afternoon, and morphine sulfate ER 100mg at night   - Will keep standing morning and evening doses  - Confirmed ISTOPP Reference #: [id number]     * BPH  -finasteride and BPH    *DVT ppx   Lovenox subq    DISPO: pending PM&R consult

## 2020-03-04 NOTE — PROGRESS NOTE ADULT - SUBJECTIVE AND OBJECTIVE BOX
This is a 68 y/o male with PMH osteoarthritis, osteoporosis, glaucoma, BPH, seizure disorder, former smoker/ETOH use, chronic back pain on PO morphine who presented to [facility] on 2/28 with left hip pain. Patient reports that he tripped on the stairs while trying to take out the garbage. He turned to listen to his mother while on the stairs and lost his balance, falling onto his left hip. Patient then pulled himself into the living room, unable to ambulate, until his cousin found him the next morning.  In the ED, xray confirmed left IT fracture. Patient cleared for surgery and underwent ORIF with placement of dynamic hip screw on 2/29 by Dr Coleman.   Post operative course notable for acute blood loss anemia, new DVT in left leg and left knee effusion.   Patient started on therapeutic lovenox. Pulm consulted and recommended Eliquis for treatment of DVT once cleared by ortho.   OT patient and has found that he needs max assist for toilet transfers. PT found patient needed min to mod A for bed mobility, transfers and ambulation 5 steps with RW. Discussed progress with physical therapist and today patient required min A x2 for transfers and ambulation 20 steps with RW. Therapist reports that patient is very reluctant to place any weight on left leg.   PM&R follow up today.         ROS: Reports pain in the left hip with movement that becomes severe. Pain in left hip at rest "dull" and tolerable. Reports that he is not putting any weight on the left leg due to pain. Reports that he walked "alot" today.   Denies SOB, CP, HA, dizziness, n/v, palpitations, abdominal pain, dysuria, difficulty urinating.   Reports pain in left shoulder, chronic. Unable to describe reason for pain but was present PTA. Does report that he's had injections into both shoulders in the past. Injections helped with pain in right shoulder but not with left shoulder pain.   Can't recall if injection was steroid or an analgesic.       03-03    140  |  105  |  14  ----------------------------<  111<H>  4.2   |  30  |  0.48<L>    Ca    8.7      03 Mar 2020 05:54                          10.5   7.80  )-----------( 181      ( 03 Mar 2020 05:54 )             31.9   < from: US Duplex Venous Lower Ext Ltd, Left (03.03.20 @ 20:30) >    EXAM:  US DPLX LWR EXT VEINS LTD LT      PROCEDURE DATE:  03/03/2020        INTERPRETATION:  CLINICAL INFORMATION: Left femoral-popliteal vein deep vein thrombosis follow-up    COMPARISON: Same day lower leg ultrasound    TECHNIQUE: Duplex sonography of the LEFT LOWER extremity veins with color and spectral Doppler, with and without compression.      FINDINGS:    There is normal compressibility of the left common femoral, veins. Acute deep vein thrombosis from the upper femoral vein to the popliteal vein and into the calf veins.    The contralateral common femoral vein is patent.    IMPRESSION:     Acute deep vein thrombosis from the left upper femoral vein to the popliteal vein and into the calf veins confirmed as previously reported.        HUGH MARIO   This document has been electronically signed. Mar  3 2020  8:46PM          < end of copied text >        On Physical Examination:    Vital Signs Last 24 Hrs  T(C): 36.9 (04 Mar 2020 11:00), Max: 37.1 (03 Mar 2020 21:58)  T(F): 98.4 (04 Mar 2020 11:00), Max: 98.8 (03 Mar 2020 21:58)  HR: 108 (04 Mar 2020 11:00) (87 - 108)  BP: 97/68 (04 Mar 2020 11:00) (97/68 - 116/73)  RR: 20 (04 Mar 2020 11:00) (15 - 20)  SpO2: 95% (04 Mar 2020 11:00) (94% - 95%    Mental Status - Patient is alert, awake, oriented X3.   Motor Exam -   Right upper full AROM, 5/5 strengths throughout   Left upper limited AROM secondary to pain, able to passivley range the shoulder, pain occurs with abduction and flexion of the shoulder, 4/5 shoulder strengths, 5/5 elbow/wrist/hand strengths   Right lower 4+/5 right hip flexor, 5/5 knee and ankle strengths   Left lower still unable to pick thigh off of chair secondary to pain, 3/5 knee flexor due to pain, 4+/5 ankle strengths  Normal muscle bulk/tone  Sensory    Intact to light touch bilaterally.                                GENERAL Exam:     Nontoxic , No Acute Distress 	  HEENT:  normocephalic, atraumatic		  LUNGS:	Clear bilaterally    HEART:	 Normal S1S2 , tachycardic     GI/ ABDOMEN:  Soft  Non tender +BS  EXTREMITIES:   trace edema left lower extremity   SKIN:    Aquacel dressing c/d/i      Functional Exam:   Patient stood with min to mod A x1. Patient stood for under 20 seconds and stated that he could not stand any longer.

## 2020-03-04 NOTE — PROGRESS NOTE ADULT - SUBJECTIVE AND OBJECTIVE BOX
Patient is a 69y old  Male who presents with a chief complaint of left intertrochanteric fx (04 Mar 2020 09:40)      Patient seen and examined at bedside. No overnight events reported.     ALLERGIES:  Flonase (Headache)  No Known Allergies    MEDICATIONS  (STANDING):  ascorbic acid 500 milliGRAM(s) Oral daily  enoxaparin Injectable 60 milliGRAM(s) SubCutaneous every 12 hours  finasteride 5 milliGRAM(s) Oral daily  latanoprost 0.005% Ophthalmic Solution 1 Drop(s) Both EYES at bedtime  melatonin 3 milliGRAM(s) Oral at bedtime  mirabegron ER 50 milliGRAM(s) Oral daily  morphine ER Tablet 200 milliGRAM(s) Oral daily  morphine ER Tablet 100 milliGRAM(s) Oral at bedtime  phenytoin   Capsule 400 milliGRAM(s) Oral at bedtime  senna 2 Tablet(s) Oral at bedtime  tamsulosin 0.8 milliGRAM(s) Oral at bedtime    MEDICATIONS  (PRN):  acetaminophen   Tablet .. 650 milliGRAM(s) Oral every 6 hours PRN Temp greater or equal to 38C (100.4F)  ALBUTerol    90 MICROgram(s) HFA Inhaler 2 Puff(s) Inhalation every 6 hours PRN Shortness of Breath and/or Wheezing  lactulose Syrup 10 Gram(s) Oral daily PRN constipation  lidocaine   Patch 1 Patch Transdermal every 24 hours PRN pain  morphine  - Injectable 4 milliGRAM(s) IV Push every 3 hours PRN Severe Pain (7 - 10)  ondansetron Injectable 4 milliGRAM(s) IV Push every 6 hours PRN Nausea and/or Vomiting  polyethylene glycol 3350 17 Gram(s) Oral daily PRN Constipation    Vital Signs Last 24 Hrs  T(F): 98.4 (04 Mar 2020 11:00), Max: 99.6 (03 Mar 2020 15:37)  HR: 108 (04 Mar 2020 11:00) (87 - 108)  BP: 97/68 (04 Mar 2020 11:00) (90/62 - 116/73)  RR: 20 (04 Mar 2020 11:00) (15 - 20)  SpO2: 95% (04 Mar 2020 11:00) (91% - 95%)  I&O's Summary    03 Mar 2020 07:01  -  04 Mar 2020 07:00  --------------------------------------------------------  IN: 750 mL / OUT: 2750 mL / NET: -2000 mL    04 Mar 2020 07:01  -  04 Mar 2020 12:16  --------------------------------------------------------  IN: 360 mL / OUT: 400 mL / NET: -40 mL          GENERAL: NAD  HEAD:  Atraumatic, Normocephalic  EYES: EOMI, PERRLA, sclera clear  NECK: Supple  CHEST/LUNG: Clear to auscultation bilaterally; No wheeze  HEART: Regular rate and rhythm; No murmurs, rubs, or gallops  ABDOMEN: Soft, Nontender, Nondistended; Bowel sounds present  EXTREMITIES: No clubbing, cyanosis. + left knee edema (improved from yesterday), no erythema, mild warmth. + surgical dressing L thigh  NEUROLOGY: non-focal  SKIN: No rashes or lesions    LABS:                        10.5   7.80  )-----------( 181      ( 03 Mar 2020 05:54 )             31.9     03-03    140  |  105  |  14  ----------------------------<  111  4.2   |  30  |  0.48    Ca    8.7      03 Mar 2020 05:54  Mg     1.7     03-02      eGFR if Non African American: 114 mL/min/1.73M2 (03-03-20 @ 05:54)  eGFR if African American: 132 mL/min/1.73M2 (03-03-20 @ 05:54)    PT/INR - ( 02 Mar 2020 06:44 )   PT: 15.3 sec;   INR: 1.35 ratio         PTT - ( 02 Mar 2020 06:44 )  PTT:29.4 sec    RADIOLOGY & ADDITIONAL TESTS:    Care Discussed with Consultants/Other Providers: d/w ortho PA

## 2020-03-04 NOTE — PROGRESS NOTE ADULT - ASSESSMENT
Physical Examination:  GENERAL:               Alert, Oriented, No acute distress.    HEENT:                   No JVD, Moist MM  PULM:                     Bilateral air entry, Clear to auscultation bilaterally, no significant sputum production, No Rales, No Rhonchi, No Wheezing  CVS:                         S1, S2,  No Murmur  ABD:                        Soft, nondistended, nontender, normoactive bowel sounds,   NEURO:                  Alert, oriented, interactive, nonfocal, follows commands  PSYC:                      Calm, + Insight.    Assessment  1. Chronic COPD likely as Ex smoker- suspect Mild/Asymptomatic  2. New LLE DVT - provoked despite being on Lovenox for dvt ppx.   3. MARY GRACE - not tolerated cpap / bipap  4. Mechanical fall with Left hip fx  5 Underlying Osteoporosis, BPH (benign prostatic hyperplasia), Back pain, Osteoarthritis    Plan  - currently on lovenox BID, can switch to eliquis for 3-6 month course based on affordability and when OK by Ortho  - Incentive spirometry  - carol PRN  - OOB, PT  - can d/c continuos pulse ox, d/c o2

## 2020-03-04 NOTE — PROGRESS NOTE ADULT - SUBJECTIVE AND OBJECTIVE BOX
" My knee hurts"    S  This 70 y/o male with hx of left hip IT fx, s/p DHS ,copd,   is found sitting up in bed eating. He was diagnosed last night with a DVT of left lower extremity and given Lovenox.  He states that the swelling in his left knee has gotten better since yesterday , but continues to cause restriction of movement .  He denies any severe hip pain , no fever no chills no chest pain no sob no other complaints.  Patient states he is urinating well and has had bm.  No other complaints at this time.    O A & O x 3 in NAD   ICU Vital Signs Last 24 Hrs  T(C): 36.9 (04 Mar 2020 05:23), Max: 37.6 (03 Mar 2020 15:37)  T(F): 98.4 (04 Mar 2020 05:23), Max: 99.6 (03 Mar 2020 15:37)  HR: 87 (04 Mar 2020 05:23) (87 - 99)  BP: 116/73 (04 Mar 2020 05:23) (90/62 - 116/73)  BP(mean): --  ABP: --  ABP(mean): --  RR: 15 (04 Mar 2020 05:23) (15 - 18)  SpO2: 94% (04 Mar 2020 05:23) (91% - 95%)      Left leg :  Aquacell clean and dry intact, thigh is soft nontender with palpation, no erythema no edema                Knee:  mild sup< from: US Duplex Venous Lower Ext Ltd, Left (03.03.20 @ 20:30) >      < end of copied text >  < from: US Duplex Venous Lower Ext Ltd, Left (03.03.20 @ 20:30) >      < end of copied text >  rapatellar effusion palpated, no warmth or redness noted, limited ROM 2/2 discomfort     CBC Full  -  ( 03 Mar 2020 05:54 )  WBC Count : 7.80 K/uL  RBC Count : 3.30 M/uL  Hemoglobin : 10.5 g/dL  Hematocrit : 31.9 %  Platelet Count - Automated : 181 K/uL  Mean Cell Volume : 96.7 fl  Mean Cell Hemoglobin : 31.8 pg  Mean Cell Hemoglobin Concentration : 32.9 gm/dL  Auto Neutrophil # : 5.48 K/uL  Auto Lymphocyte # : 1.29 K/uL  Auto Monocyte # : 0.75 K/uL  Auto Eosinophil # : 0.21 K/uL  Auto Basophil # : 0.04 K/uL  Auto Neutrophil % : 70.3 %  Auto Lymphocyte % : 16.5 %  Auto Monocyte % : 9.6 %  Auto Eosinophil % : 2.7 %  Auto Basophil % : 0.5 %    A/P    POD#4 s/p Left Hip IT Fx s/p DHS       + DVT LLE on Lovenox   Left knee effusion  US shows small effusion , XRAY Pending   Continue pain management   PT as prescribed   plan and pt status to be discussed with Dr Coleman

## 2020-03-04 NOTE — PROGRESS NOTE ADULT - ASSESSMENT
This is a 68 y/o male with PMH osteoarthritis, osteoporosis, glaucoma, BPH, seizure disorder, former smoker/ETOH use, chronic back pain on PO morphine who presented to [facility] on 2/28 with left hip pain, found to have left IT fx s/p ORIF. now with functional deficits consisting of gait and ADL impairments.     Recommendations:   Continue pain regimen per primary and surgical teams.   Continue medical management per hospitalist.   Patient with new DVT. On therapeutic lovenox with recommendations to switch to Eliquis once cleared by surgery, however, recommend vascular consult prior to continuing rehab efforts.   Patient has tolerated minimal bedside therapies but is now POD #4 and appears he will need a longer course of therapy greater than 7-10 days.   Also of note, patient remains tachycardic on telemetry monitoring prior to attempting functional exam.   At this time, will recommend for [facility] transfer to continue rehab efforts once medically cleared by medicine and orthopedic surgery and vascular surgery.

## 2020-03-05 LAB
ANION GAP SERPL CALC-SCNC: 4 MMOL/L — LOW (ref 5–17)
BASOPHILS # BLD AUTO: 0.04 K/UL — SIGNIFICANT CHANGE UP (ref 0–0.2)
BASOPHILS NFR BLD AUTO: 0.4 % — SIGNIFICANT CHANGE UP (ref 0–2)
BUN SERPL-MCNC: 17 MG/DL — SIGNIFICANT CHANGE UP (ref 7–23)
CALCIUM SERPL-MCNC: 9 MG/DL — SIGNIFICANT CHANGE UP (ref 8.4–10.5)
CHLORIDE SERPL-SCNC: 102 MMOL/L — SIGNIFICANT CHANGE UP (ref 96–108)
CO2 SERPL-SCNC: 31 MMOL/L — SIGNIFICANT CHANGE UP (ref 22–31)
CREAT SERPL-MCNC: 0.56 MG/DL — SIGNIFICANT CHANGE UP (ref 0.5–1.3)
EOSINOPHIL # BLD AUTO: 0.23 K/UL — SIGNIFICANT CHANGE UP (ref 0–0.5)
EOSINOPHIL NFR BLD AUTO: 2.5 % — SIGNIFICANT CHANGE UP (ref 0–6)
GLUCOSE SERPL-MCNC: 110 MG/DL — HIGH (ref 70–99)
HCT VFR BLD CALC: 31.3 % — LOW (ref 39–50)
HGB BLD-MCNC: 10.4 G/DL — LOW (ref 13–17)
IMM GRANULOCYTES NFR BLD AUTO: 0.4 % — SIGNIFICANT CHANGE UP (ref 0–1.5)
LYMPHOCYTES # BLD AUTO: 1.3 K/UL — SIGNIFICANT CHANGE UP (ref 1–3.3)
LYMPHOCYTES # BLD AUTO: 14.3 % — SIGNIFICANT CHANGE UP (ref 13–44)
MCHC RBC-ENTMCNC: 32 PG — SIGNIFICANT CHANGE UP (ref 27–34)
MCHC RBC-ENTMCNC: 33.2 GM/DL — SIGNIFICANT CHANGE UP (ref 32–36)
MCV RBC AUTO: 96.3 FL — SIGNIFICANT CHANGE UP (ref 80–100)
MONOCYTES # BLD AUTO: 0.83 K/UL — SIGNIFICANT CHANGE UP (ref 0–0.9)
MONOCYTES NFR BLD AUTO: 9.2 % — SIGNIFICANT CHANGE UP (ref 2–14)
NEUTROPHILS # BLD AUTO: 6.62 K/UL — SIGNIFICANT CHANGE UP (ref 1.8–7.4)
NEUTROPHILS NFR BLD AUTO: 73.2 % — SIGNIFICANT CHANGE UP (ref 43–77)
NRBC # BLD: 0 /100 WBCS — SIGNIFICANT CHANGE UP (ref 0–0)
PLATELET # BLD AUTO: 228 K/UL — SIGNIFICANT CHANGE UP (ref 150–400)
POTASSIUM SERPL-MCNC: 3.9 MMOL/L — SIGNIFICANT CHANGE UP (ref 3.5–5.3)
POTASSIUM SERPL-SCNC: 3.9 MMOL/L — SIGNIFICANT CHANGE UP (ref 3.5–5.3)
RBC # BLD: 3.25 M/UL — LOW (ref 4.2–5.8)
RBC # FLD: 13 % — SIGNIFICANT CHANGE UP (ref 10.3–14.5)
SODIUM SERPL-SCNC: 137 MMOL/L — SIGNIFICANT CHANGE UP (ref 135–145)
WBC # BLD: 9.06 K/UL — SIGNIFICANT CHANGE UP (ref 3.8–10.5)
WBC # FLD AUTO: 9.06 K/UL — SIGNIFICANT CHANGE UP (ref 3.8–10.5)

## 2020-03-05 PROCEDURE — 99233 SBSQ HOSP IP/OBS HIGH 50: CPT

## 2020-03-05 RX ORDER — COLCHICINE 0.6 MG
1.2 TABLET ORAL ONCE
Refills: 0 | Status: COMPLETED | OUTPATIENT
Start: 2020-03-05 | End: 2020-03-05

## 2020-03-05 RX ORDER — ALENDRONATE SODIUM 70 MG/1
70 TABLET ORAL
Refills: 0 | Status: COMPLETED | OUTPATIENT
Start: 2020-03-06 | End: 2020-03-06

## 2020-03-05 RX ORDER — COLCHICINE 0.6 MG
0.6 TABLET ORAL ONCE
Refills: 0 | Status: COMPLETED | OUTPATIENT
Start: 2020-03-05 | End: 2020-03-05

## 2020-03-05 RX ORDER — COLCHICINE 0.6 MG
0.6 TABLET ORAL
Refills: 0 | Status: COMPLETED | OUTPATIENT
Start: 2020-03-06 | End: 2020-03-09

## 2020-03-05 RX ORDER — APIXABAN 2.5 MG/1
10 TABLET, FILM COATED ORAL EVERY 12 HOURS
Refills: 0 | Status: DISCONTINUED | OUTPATIENT
Start: 2020-03-05 | End: 2020-03-10

## 2020-03-05 RX ADMIN — LIDOCAINE 1 PATCH: 4 CREAM TOPICAL at 11:27

## 2020-03-05 RX ADMIN — LIDOCAINE 1 PATCH: 4 CREAM TOPICAL at 23:30

## 2020-03-05 RX ADMIN — Medication 400 MILLIGRAM(S): at 21:33

## 2020-03-05 RX ADMIN — LATANOPROST 1 DROP(S): 0.05 SOLUTION/ DROPS OPHTHALMIC; TOPICAL at 21:32

## 2020-03-05 RX ADMIN — APIXABAN 10 MILLIGRAM(S): 2.5 TABLET, FILM COATED ORAL at 18:13

## 2020-03-05 RX ADMIN — Medication 500 MILLIGRAM(S): at 11:27

## 2020-03-05 RX ADMIN — Medication 0.6 MILLIGRAM(S): at 18:13

## 2020-03-05 RX ADMIN — Medication 3 MILLIGRAM(S): at 21:32

## 2020-03-05 RX ADMIN — MORPHINE SULFATE 100 MILLIGRAM(S): 50 CAPSULE, EXTENDED RELEASE ORAL at 22:15

## 2020-03-05 RX ADMIN — SENNA PLUS 2 TABLET(S): 8.6 TABLET ORAL at 21:33

## 2020-03-05 RX ADMIN — FINASTERIDE 5 MILLIGRAM(S): 5 TABLET, FILM COATED ORAL at 11:27

## 2020-03-05 RX ADMIN — ENOXAPARIN SODIUM 60 MILLIGRAM(S): 100 INJECTION SUBCUTANEOUS at 04:30

## 2020-03-05 RX ADMIN — Medication 1.2 MILLIGRAM(S): at 16:28

## 2020-03-05 RX ADMIN — MORPHINE SULFATE 100 MILLIGRAM(S): 50 CAPSULE, EXTENDED RELEASE ORAL at 21:31

## 2020-03-05 RX ADMIN — POLYETHYLENE GLYCOL 3350 17 GRAM(S): 17 POWDER, FOR SOLUTION ORAL at 11:30

## 2020-03-05 RX ADMIN — MORPHINE SULFATE 200 MILLIGRAM(S): 50 CAPSULE, EXTENDED RELEASE ORAL at 11:27

## 2020-03-05 RX ADMIN — LIDOCAINE 1 PATCH: 4 CREAM TOPICAL at 19:35

## 2020-03-05 RX ADMIN — MIRABEGRON 50 MILLIGRAM(S): 50 TABLET, EXTENDED RELEASE ORAL at 11:30

## 2020-03-05 RX ADMIN — TAMSULOSIN HYDROCHLORIDE 0.8 MILLIGRAM(S): 0.4 CAPSULE ORAL at 21:33

## 2020-03-05 RX ADMIN — Medication 650 MILLIGRAM(S): at 11:04

## 2020-03-05 RX ADMIN — Medication 650 MILLIGRAM(S): at 11:30

## 2020-03-05 NOTE — PROGRESS NOTE ADULT - SUBJECTIVE AND OBJECTIVE BOX
Follow-up Pulmonary Progress Note  Chief Complaint : Fracture of unspecified part of neck of left femur, initial encounter for closed fracture      pt feels well, no cp, sob, palp, n/v      Allergies :Flonase (Headache)  No Known Allergies      PAST MEDICAL & SURGICAL HISTORY:  Osteoporosis  BPH (benign prostatic hyperplasia)  Back pain  Osteoarthritis  History of back surgery      Medications:  MEDICATIONS  (STANDING):  ascorbic acid 500 milliGRAM(s) Oral daily  enoxaparin Injectable 60 milliGRAM(s) SubCutaneous every 12 hours  finasteride 5 milliGRAM(s) Oral daily  latanoprost 0.005% Ophthalmic Solution 1 Drop(s) Both EYES at bedtime  melatonin 3 milliGRAM(s) Oral at bedtime  mirabegron ER 50 milliGRAM(s) Oral daily  morphine ER Tablet 200 milliGRAM(s) Oral daily  morphine ER Tablet 100 milliGRAM(s) Oral at bedtime  phenytoin   Capsule 400 milliGRAM(s) Oral at bedtime  polyethylene glycol 3350 17 Gram(s) Oral daily  senna 2 Tablet(s) Oral at bedtime  tamsulosin 0.8 milliGRAM(s) Oral at bedtime    MEDICATIONS  (PRN):  acetaminophen   Tablet .. 650 milliGRAM(s) Oral every 6 hours PRN Temp greater or equal to 38C (100.4F)  ALBUTerol    90 MICROgram(s) HFA Inhaler 2 Puff(s) Inhalation every 6 hours PRN Shortness of Breath and/or Wheezing  lactulose Syrup 10 Gram(s) Oral daily PRN constipation  lidocaine   Patch 1 Patch Transdermal every 24 hours PRN pain  morphine  - Injectable 4 milliGRAM(s) IV Push every 3 hours PRN Severe Pain (7 - 10)  ondansetron Injectable 4 milliGRAM(s) IV Push every 6 hours PRN Nausea and/or Vomiting      LABS:                        10.4   9.06  )-----------( 228      ( 05 Mar 2020 06:30 )             31.3     03-05    137  |  102  |  17  ----------------------------<  110<H>  3.9   |  31  |  0.56    Ca    9.0      05 Mar 2020 06:30        VITALS:  T(C): 37.1 (03-05-20 @ 09:46), Max: 37.4 (03-04-20 @ 16:56)  T(F): 98.7 (03-05-20 @ 09:46), Max: 99.3 (03-04-20 @ 16:56)  HR: 85 (03-05-20 @ 09:46) (85 - 108)  BP: 97/60 (03-05-20 @ 09:46) (97/60 - 120/62)  BP(mean): --  ABP: --  ABP(mean): --  RR: 16 (03-05-20 @ 09:46) (14 - 20)  SpO2: 93% (03-05-20 @ 09:46) (93% - 100%)  CVP(mm Hg): --  CVP(cm H2O): --    Ins and Outs     03-04-20 @ 07:01  -  03-05-20 @ 07:00  --------------------------------------------------------  IN: 900 mL / OUT: 2400 mL / NET: -1500 mL                I&O's Detail    04 Mar 2020 07:01  -  05 Mar 2020 07:00  --------------------------------------------------------  IN:    Oral Fluid: 900 mL  Total IN: 900 mL    OUT:    Voided: 2400 mL  Total OUT: 2400 mL    Total NET: -1500 mL

## 2020-03-05 NOTE — PROGRESS NOTE ADULT - SUBJECTIVE AND OBJECTIVE BOX
68 yo male POD # 5 s/p left hip DHS  pt had x-ray of left knee done and duplex of left leg which showed left femoral vein DVT pt currently on Lovenox 60 mg q 12   pt overall feels well, denies complaints, pain well managed  denies n/v/f/c    Vital Signs Last 24 Hrs  T(C): 37.2 (05 Mar 2020 05:30), Max: 37.4 (04 Mar 2020 16:56)  T(F): 98.9 (05 Mar 2020 05:30), Max: 99.3 (04 Mar 2020 16:56)  HR: 90 (05 Mar 2020 05:30) (88 - 108)  BP: 102/62 (05 Mar 2020 05:30) (97/68 - 120/62)  RR: 16 (05 Mar 2020 05:30) (14 - 20)  SpO2: 100% (05 Mar 2020 05:30) (95% - 100%)    pt seen and examined at bedside  a+ox3, nad, non toxic  nc/at  abd - soft, nd, nttp  RLE - strength 5/5, sensation intact, FROM  LLE - mild thigh swelling, dressing c/d/i, soft calf, knee swelling is much improved, minimal palpable swelling laterally, no erythema or ecchymoses, good dorsi/plantarflexion, foot is warm, minimal ankle swelling noted, DP 2+   - no Todd, pt voiding on his own                          10.4   9.06  )-----------( 228      ( 05 Mar 2020 06:30 )             31.3   03-05    137  |  102  |  17  ----------------------------<  110<H>  3.9   |  31  |  0.56    Ca    9.0      05 Mar 2020 06:30    < from: US Duplex Venous Lower Ext Ltd, Left (03.03.20 @ 20:30) >  IMPRESSION:     Acute deep vein thrombosis from the left upper femoral vein to the popliteal vein and into the calf veins confirmed as previously reported.    < from: Xray Knee 3 Views, Left (03.04.20 @ 09:54) >  Three views of the left knee show no acute fracture or dislocation. There is calcification of the menisci and moderate joint effusion projects above the patella.    IMPRESSION: No acute bony pathology.

## 2020-03-05 NOTE — PROGRESS NOTE ADULT - ASSESSMENT
68 yo male POD # 5 s/p left hip DHS  pt had x-ray of left knee done and duplex of left leg which showed left femoral vein DVT pt currently on Lovenox 60 mg q 12   pt overall feels well, denies complaints, pain well managed  denies n/v/f/c    labs stable    left femoral vein DVT, treated with Lovenox    pain well managed    left knee swelling improving     plan  - DVT treatment with Lovenox  - WBAT with PT  - analgesia prn  - awaiting acceptance to rehab    will d/w Dr. Coleman

## 2020-03-05 NOTE — PROGRESS NOTE ADULT - ASSESSMENT
Pt is a 68yo M admitted to [facility] s/p with left intertrochanteric fx.     * left intertrochanteric fx s/p mechanical fall  s/p ORIF POD 5  pain management as per ortho  ortho consult appreciated   PT/OT eval  PM&R consult: re-evaluation today but most likely [facility]    # LLE DVT likely provoked DVT from recent surgery  - venous doppler: left femoral vein to popiteal and calf veins  - switch to load dose eliquis 10mg for 12 more dose, then eliquis 5mg q12 for 3-6 months    * Left knee swelling/induration. possibly pseudogout  - sono left knee showed moderate joint effusion. left knee swelling improved. spoke to orthopedics to consider arthrocentesis  - Left knee X ray: no acute fracture . + calcification of the menisci and joint effusion above the patella  - trial of colchicine total of 1.8mg today, then 0.6mg q12 for 4 more days    *COPD - stable  *MARY GRACE - not tolerated CPAP/biapp  seen on CXR   pulm consult appreciated  nebs prn, incentive spirometry     *BPH  cont Finasteride, tamsulosin     *Hep C s/p treatment  - hep c RNA non-detectable  - follow up out pt GI     *osteoporosis  Alendronate 70mg q week hold    * seizure disorder   - phenytoin qHS     *Chronic pain syndrome  - Takes PO morphine sulfate ER 200mg in morning, morphine sulfate IR 60mg in afternoon, and morphine sulfate ER 100mg at night   - Will keep standing morning and evening doses  - Confirmed ISTOPP Reference #: [id number]     * BPH  -finasteride and BPH    *DVT ppx   Lovenox subq    DISPO: pending PM&R consult Pt is a 70yo M admitted to [facility] s/p with left intertrochanteric fx.     * left intertrochanteric fx s/p mechanical fall  s/p ORIF POD 5  pain management as per ortho  ortho consult appreciated   PT/OT eval  PM&R consult: re-evaluation today but most likely [facility]    # LLE DVT likely provoked DVT from recent surgery  - venous doppler: left femoral vein to popiteal and calf veins  - switch to load dose eliquis 10mg for 12 more dose, then eliquis 5mg q12 for 3-6 months    * Left knee swelling/induration. possibly pseudogout  - sono left knee showed moderate joint effusion. left knee swelling improved. spoke to orthopedics to consider arthrocentesis  - Left knee X ray: no acute fracture . + calcification of the menisci and joint effusion above the patella  - trial of colchicine total of 1.8mg today, then 0.6mg q12 for 4 more days    *COPD - stable  *MARY GRACE - not tolerated CPAP/biapp  seen on CXR   pulm consult appreciated  nebs prn, incentive spirometry     *BPH  cont Finasteride, tamsulosin     *Hep C s/p treatment  - hep c RNA non-detectable  - follow up out pt GI     *osteoporosis  Alendronate 70mg q week. one dose ordered for tomorrow AM    * seizure disorder   - phenytoin qHS     *Chronic pain syndrome  - Takes PO morphine sulfate ER 200mg in morning, morphine sulfate IR 60mg in afternoon, and morphine sulfate ER 100mg at night   - Will keep standing morning and evening doses  - Confirmed ISTOPP Reference #: [id number]     * BPH  -finasteride and BPH    *DVT ppx   Lovenox subq    DISPO: pending PM&R consult Pt is a 68yo M admitted to [facility] s/p with left intertrochanteric fx.     * left intertrochanteric fx s/p mechanical fall  s/p ORIF POD 5  pain management as per ortho  ortho consult appreciated   PT/OT eval  PM&R consult: re-evaluation today but most likely [facility]    # LLE DVT likely provoked DVT from recent surgery  - venous doppler: left upper femoral vein to popiteal and calf veins  - switch to load dose Eliquis 10mg for 12 more dose, then Eliquis 5mg q12 for 3-6 months  - Spoke to Dr. Sawyer from [facility] on the phone regarding clearance to rehab. Dr. Sawyer is not formally consulted, but gave his opinion as a courtesy. Base on the extent of pt's DVT, would delay discharge to the rehab for one week. Pt can still ambulate with PT while inpatient.    * Left knee swelling/induration. possibly pseudogout  - sono left knee showed moderate joint effusion. left knee swelling improved. spoke to orthopedics to consider arthrocentesis  - Left knee X ray: no acute fracture . + calcification of the menisci and joint effusion above the patella  - trial of colchicine total of 1.8mg today, then 0.6mg q12 for 4 more days    *COPD - stable  *MARY GRACE - not tolerated CPAP/biapp  seen on CXR   pulm consult appreciated  nebs prn, incentive spirometry     *BPH  cont Finasteride, tamsulosin     *Hep C s/p treatment  - hep c RNA non-detectable  - follow up out pt GI     *osteoporosis  Alendronate 70mg q week. one dose ordered for tomorrow AM    * seizure disorder   - phenytoin qHS     *Chronic pain syndrome  - Takes PO morphine sulfate ER 200mg in morning, morphine sulfate IR 60mg in afternoon, and morphine sulfate ER 100mg at night   - Will keep standing morning and evening doses  - Confirmed ISTOPP Reference #: [id number]     * BPH  -finasteride and BPH    *DVT ppx   Lovenox subq    DISPO: pending PM&R consult

## 2020-03-05 NOTE — PROGRESS NOTE ADULT - ASSESSMENT
Physical Examination:  GENERAL:               Alert, Oriented, No acute distress.    HEENT:                   No JVD, Moist MM  PULM:                     Bilateral air entry, Clear to auscultation bilaterally, no significant sputum production, No Rales, No Rhonchi, No Wheezing  CVS:                         S1, S2,  No Murmur  ABD:                        Soft, nondistended, nontender, normoactive bowel sounds,   NEURO:                  Alert, oriented, interactive, nonfocal, follows commands  PSYC:                      Calm, + Insight.    Assessment  1. Chronic COPD likely as Ex smoker- suspect Mild/Asymptomatic  2. New LLE DVT - provoked despite being on Lovenox for dvt ppx.   3. MARY GRACE - not tolerated cpap / bipap  4. Mechanical fall with Left hip fx  5 Underlying Osteoporosis, BPH (benign prostatic hyperplasia), Back pain, Osteoarthritis    Plan  - currently on lovenox BID, can switch to eliquis for 3-6 month course based on affordability and when OK by Ortho  - Incentive spirometry  - carol PRN  - OOB, PT  - monitor on RA  - dispo planning

## 2020-03-05 NOTE — PROGRESS NOTE ADULT - SUBJECTIVE AND OBJECTIVE BOX
Patient is a 69y old  Male who presents with a chief complaint of left intertrochanteric fx (05 Mar 2020 10:57)      Patient seen and examined at bedside. No overnight events reported. Pt reports pain controlled. moderate pain on left knee on movement, about 4-5/10. no redness on L knee and swelling improving.    ALLERGIES:  Flonase (Headache)  No Known Allergies    MEDICATIONS  (STANDING):  apixaban 10 milliGRAM(s) Oral every 12 hours  ascorbic acid 500 milliGRAM(s) Oral daily  finasteride 5 milliGRAM(s) Oral daily  latanoprost 0.005% Ophthalmic Solution 1 Drop(s) Both EYES at bedtime  melatonin 3 milliGRAM(s) Oral at bedtime  mirabegron ER 50 milliGRAM(s) Oral daily  morphine ER Tablet 200 milliGRAM(s) Oral daily  morphine ER Tablet 100 milliGRAM(s) Oral at bedtime  phenytoin   Capsule 400 milliGRAM(s) Oral at bedtime  polyethylene glycol 3350 17 Gram(s) Oral daily  senna 2 Tablet(s) Oral at bedtime  tamsulosin 0.8 milliGRAM(s) Oral at bedtime    MEDICATIONS  (PRN):  acetaminophen   Tablet .. 650 milliGRAM(s) Oral every 6 hours PRN Temp greater or equal to 38C (100.4F)  ALBUTerol    90 MICROgram(s) HFA Inhaler 2 Puff(s) Inhalation every 6 hours PRN Shortness of Breath and/or Wheezing  lactulose Syrup 10 Gram(s) Oral daily PRN constipation  lidocaine   Patch 1 Patch Transdermal every 24 hours PRN pain  morphine  - Injectable 4 milliGRAM(s) IV Push every 3 hours PRN Severe Pain (7 - 10)  ondansetron Injectable 4 milliGRAM(s) IV Push every 6 hours PRN Nausea and/or Vomiting    Vital Signs Last 24 Hrs  T(F): 98.7 (05 Mar 2020 09:46), Max: 99.3 (04 Mar 2020 16:56)  HR: 85 (05 Mar 2020 09:46) (85 - 100)  BP: 97/60 (05 Mar 2020 09:46) (97/60 - 120/62)  RR: 16 (05 Mar 2020 09:46) (14 - 20)  SpO2: 93% (05 Mar 2020 09:46) (93% - 100%)  I&O's Summary    04 Mar 2020 07:01  -  05 Mar 2020 07:00  --------------------------------------------------------  IN: 900 mL / OUT: 2400 mL / NET: -1500 mL    05 Mar 2020 07:01  -  05 Mar 2020 11:36  --------------------------------------------------------  IN: 0 mL / OUT: 250 mL / NET: -250 mL      GENERAL: NAD  HEAD:  Atraumatic, Normocephalic  EYES: EOMI, PERRLA, sclera clear  NECK: Supple  CHEST/LUNG: Clear to auscultation bilaterally; No wheeze  HEART: Regular rate and rhythm; No murmurs, rubs, or gallops  ABDOMEN: Soft, Nontender, Nondistended; Bowel sounds present  EXTREMITIES:  No clubbing, cyanosis. + left knee swelling  NEUROLOGY: non-focal  SKIN: No rashes     LABS:                        10.4   9.06  )-----------( 228      ( 05 Mar 2020 06:30 )             31.3     03-05    137  |  102  |  17  ----------------------------<  110  3.9   |  31  |  0.56    Ca    9.0      05 Mar 2020 06:30      eGFR if Non : 107 mL/min/1.73M2 (03-05-20 @ 06:30)  eGFR if African American: 124 mL/min/1.73M2 (03-05-20 @ 06:30)      RADIOLOGY & ADDITIONAL TESTS:    Care Discussed with Consultants/Other Providers: d/w orthopedic PA

## 2020-03-06 PROCEDURE — 99232 SBSQ HOSP IP/OBS MODERATE 35: CPT

## 2020-03-06 PROCEDURE — 99223 1ST HOSP IP/OBS HIGH 75: CPT

## 2020-03-06 RX ORDER — APIXABAN 2.5 MG/1
5 TABLET, FILM COATED ORAL EVERY 12 HOURS
Refills: 0 | Status: DISCONTINUED | OUTPATIENT
Start: 2020-03-11 | End: 2020-03-10

## 2020-03-06 RX ADMIN — POLYETHYLENE GLYCOL 3350 17 GRAM(S): 17 POWDER, FOR SOLUTION ORAL at 15:16

## 2020-03-06 RX ADMIN — Medication 0.6 MILLIGRAM(S): at 17:25

## 2020-03-06 RX ADMIN — MIRABEGRON 50 MILLIGRAM(S): 50 TABLET, EXTENDED RELEASE ORAL at 11:29

## 2020-03-06 RX ADMIN — MORPHINE SULFATE 200 MILLIGRAM(S): 50 CAPSULE, EXTENDED RELEASE ORAL at 12:30

## 2020-03-06 RX ADMIN — LACTULOSE 10 GRAM(S): 10 SOLUTION ORAL at 11:29

## 2020-03-06 RX ADMIN — TAMSULOSIN HYDROCHLORIDE 0.8 MILLIGRAM(S): 0.4 CAPSULE ORAL at 21:45

## 2020-03-06 RX ADMIN — MORPHINE SULFATE 200 MILLIGRAM(S): 50 CAPSULE, EXTENDED RELEASE ORAL at 12:20

## 2020-03-06 RX ADMIN — MORPHINE SULFATE 4 MILLIGRAM(S): 50 CAPSULE, EXTENDED RELEASE ORAL at 15:55

## 2020-03-06 RX ADMIN — MORPHINE SULFATE 100 MILLIGRAM(S): 50 CAPSULE, EXTENDED RELEASE ORAL at 22:34

## 2020-03-06 RX ADMIN — LATANOPROST 1 DROP(S): 0.05 SOLUTION/ DROPS OPHTHALMIC; TOPICAL at 21:45

## 2020-03-06 RX ADMIN — FINASTERIDE 5 MILLIGRAM(S): 5 TABLET, FILM COATED ORAL at 11:29

## 2020-03-06 RX ADMIN — Medication 0.6 MILLIGRAM(S): at 05:37

## 2020-03-06 RX ADMIN — SENNA PLUS 2 TABLET(S): 8.6 TABLET ORAL at 21:45

## 2020-03-06 RX ADMIN — MORPHINE SULFATE 200 MILLIGRAM(S): 50 CAPSULE, EXTENDED RELEASE ORAL at 11:28

## 2020-03-06 RX ADMIN — MORPHINE SULFATE 4 MILLIGRAM(S): 50 CAPSULE, EXTENDED RELEASE ORAL at 20:14

## 2020-03-06 RX ADMIN — Medication 500 MILLIGRAM(S): at 11:28

## 2020-03-06 RX ADMIN — APIXABAN 10 MILLIGRAM(S): 2.5 TABLET, FILM COATED ORAL at 05:37

## 2020-03-06 RX ADMIN — MORPHINE SULFATE 4 MILLIGRAM(S): 50 CAPSULE, EXTENDED RELEASE ORAL at 19:56

## 2020-03-06 RX ADMIN — Medication 400 MILLIGRAM(S): at 21:46

## 2020-03-06 RX ADMIN — MORPHINE SULFATE 4 MILLIGRAM(S): 50 CAPSULE, EXTENDED RELEASE ORAL at 15:25

## 2020-03-06 RX ADMIN — APIXABAN 10 MILLIGRAM(S): 2.5 TABLET, FILM COATED ORAL at 17:01

## 2020-03-06 RX ADMIN — Medication 3 MILLIGRAM(S): at 21:45

## 2020-03-06 RX ADMIN — ALENDRONATE SODIUM 70 MILLIGRAM(S): 70 TABLET ORAL at 05:37

## 2020-03-06 NOTE — DIETITIAN INITIAL EVALUATION ADULT. - PERTINENT MEDS FT
Eliquis, Colcrys, Miralax, Senna, Tylenol, Vit C, Proscar, Lactulose MS contin, Zofran, Dilantin, Flomax

## 2020-03-06 NOTE — PROGRESS NOTE ADULT - ASSESSMENT
Pt is a 70yo M admitted to [facility] s/p with left intertrochanteric fx. hospital course complicated with LLE DVT likely provoked DVT from recent surgery.    * left intertrochanteric fx s/p mechanical fall  s/p ORIF POD 6  pain management as per ortho  ortho consult appreciated   PT/OT eval  PM&R consult: Denied to acute rehab.  pending [facility]    # LLE DVT likely provoked DVT from recent surgery  - venous doppler: left upper femoral vein to popiteal and calf veins  - switch to load dose Eliquis 10mg for 12 more dose (3/11), then Eliquis 5mg q12 for 3-6 months  - Spoke to Dr. Sawyer from [facility] on the phone regarding clearance to rehab. Dr. Sawyer is not formally consulted, but gave his opinion as a courtesy. Base on the extent of pt's DVT, would delay discharge to the rehab for one week. Pt can still ambulate with PT while inpatient.  - DC on 3/9 to [facility]    * Left knee swelling/induration. possibly pseudogout  - left knee swelling improved.  - sono left knee showed moderate joint effusion. - Left knee X ray: no acute fracture . + calcification of the menisci and joint effusion above the patella  - trial of colchicine. total 5 days    *COPD - stable  *MARY GRACE - not tolerated CPAP/biapp  seen on CXR   pulm consult appreciated  nebs prn, incentive spirometry     *BPH  cont Finasteride, tamsulosin     *Hep C s/p treatment  - hep c RNA non-detectable  - follow up out pt GI     *osteoporosis  Alendronate 70mg q week. one dose ordered for today AM    * seizure disorder   - phenytoin qHS     *Chronic pain syndrome  - Takes PO morphine sulfate ER 200mg in morning, morphine sulfate IR 60mg in afternoon, and morphine sulfate ER 100mg at night   - Will keep standing morning and evening doses  - Confirmed ISTOPP Reference #: [id number]     * BPH  -finasteride and BPH    *DVT ppx   Lovenox subq    DISPO: eventual [facility] Pt is a 70yo M admitted to [facility] s/p with left intertrochanteric fx. hospital course complicated with LLE DVT likely provoked DVT from recent surgery.    * left intertrochanteric fx s/p mechanical fall  s/p ORIF POD 6  pain management as per ortho  ortho consult appreciated   PT/OT eval  PM&R consult: Denied to acute rehab.  pending [facility]    # LLE DVT likely provoked DVT from recent surgery  - venous doppler: left upper femoral vein to popiteal and calf veins  - switch to load dose Eliquis 10mg for 12 more dose (3/11), then Eliquis 5mg q12 for 3-6 months  - Spoke to Dr. Sawyer from [facility] on the phone regarding clearance to rehab. Dr. Sawyer is not formally consulted, but gave his opinion as a courtesy. Base on the extent of pt's DVT, would delay discharge to the rehab for one week and rec hematology consult. Pt can still ambulate with PT while inpatient.  - DC on 3/9 to [facility]  - f/u hematology consult    * Left knee swelling/induration. possibly pseudogout  - left knee swelling improved.  - sono left knee showed moderate joint effusion. - Left knee X ray: no acute fracture . + calcification of the menisci and joint effusion above the patella  - trial of colchicine. total 5 days    *COPD - stable  *MARY GRACE - not tolerated CPAP/biapp  seen on CXR   pulm consult appreciated  nebs prn, incentive spirometry     *BPH  cont Finasteride, tamsulosin     *Hep C s/p treatment  - hep c RNA non-detectable  - follow up out pt GI     *osteoporosis  Alendronate 70mg q week. one dose ordered for today AM    * seizure disorder   - phenytoin qHS     *Chronic pain syndrome  - Takes PO morphine sulfate ER 200mg in morning, morphine sulfate IR 60mg in afternoon, and morphine sulfate ER 100mg at night   - Will keep standing morning and evening doses  - Confirmed ISTOPP Reference #: [id number]     * BPH  -finasteride and BPH    *DVT ppx   Lovenox subq    DISPO: eventual [facility] Pt is a 70yo M admitted to [facility] s/p with left intertrochanteric fx. hospital course complicated with LLE DVT likely provoked DVT from recent surgery.    * left intertrochanteric fx s/p mechanical fall  s/p ORIF POD 6  pain management as per ortho  ortho consult appreciated   PT/OT eval  PM&R consult: Denied to acute rehab.  pending [facility]    # LLE DVT likely provoked DVT from recent surgery  - venous doppler: left upper femoral vein to popiteal and calf veins  - switch to load dose Eliquis 10mg to (3/11), then Eliquis 5mg q12 for 3-6 months  - Spoke to Dr. Sawyer from [facility] on the phone regarding clearance to rehab. Dr. Sawyer is not formally consulted, but gave his opinion as a courtesy. Base on the extent of pt's DVT, would delay discharge to the rehab for one week and rec hematology consult. Pt can still ambulate with PT while inpatient.  - DC on 3/9 to [facility]  - f/u hematology consult    * Left knee swelling/induration. possibly pseudogout  - left knee swelling improved.  - sono left knee showed moderate joint effusion. - Left knee X ray: no acute fracture . + calcification of the menisci and joint effusion above the patella  - trial of colchicine. total 5 days    *COPD - stable  *MARY GRACE - not tolerated CPAP/biapp  seen on CXR   pulm consult appreciated  nebs prn, incentive spirometry     *BPH  cont Finasteride, tamsulosin     *Hep C s/p treatment  - hep c RNA non-detectable  - follow up out pt GI     *osteoporosis  Alendronate 70mg q week. one dose ordered for today AM    * seizure disorder   - phenytoin qHS     *Chronic pain syndrome  - Takes PO morphine sulfate ER 200mg in morning, morphine sulfate IR 60mg in afternoon, and morphine sulfate ER 100mg at night   - Will keep standing morning and evening doses  - Confirmed ISTOPP Reference #: [id number]     * BPH  -finasteride and BPH    *DVT ppx   Lovenox subq    DISPO: eventual [facility]

## 2020-03-06 NOTE — PROGRESS NOTE ADULT - SUBJECTIVE AND OBJECTIVE BOX
Patient is a 69y old  Male who presents with a chief complaint of left intertrochanteric fx (05 Mar 2020 11:36)      Patient seen and examined at bedside. No overnight events reported. Pt reports pain on left thigh muscle on movement.     ALLERGIES:  Flonase (Headache)  No Known Allergies    MEDICATIONS  (STANDING):  apixaban 10 milliGRAM(s) Oral every 12 hours  ascorbic acid 500 milliGRAM(s) Oral daily  colchicine 0.6 milliGRAM(s) Oral two times a day  finasteride 5 milliGRAM(s) Oral daily  latanoprost 0.005% Ophthalmic Solution 1 Drop(s) Both EYES at bedtime  melatonin 3 milliGRAM(s) Oral at bedtime  mirabegron ER 50 milliGRAM(s) Oral daily  morphine ER Tablet 200 milliGRAM(s) Oral daily  morphine ER Tablet 100 milliGRAM(s) Oral at bedtime  phenytoin   Capsule 400 milliGRAM(s) Oral at bedtime  polyethylene glycol 3350 17 Gram(s) Oral daily  senna 2 Tablet(s) Oral at bedtime  tamsulosin 0.8 milliGRAM(s) Oral at bedtime    MEDICATIONS  (PRN):  acetaminophen   Tablet .. 650 milliGRAM(s) Oral every 6 hours PRN Temp greater or equal to 38C (100.4F)  ALBUTerol    90 MICROgram(s) HFA Inhaler 2 Puff(s) Inhalation every 6 hours PRN Shortness of Breath and/or Wheezing  lactulose Syrup 10 Gram(s) Oral daily PRN constipation  lidocaine   Patch 1 Patch Transdermal every 24 hours PRN pain  morphine  - Injectable 4 milliGRAM(s) IV Push every 3 hours PRN Severe Pain (7 - 10)  ondansetron Injectable 4 milliGRAM(s) IV Push every 6 hours PRN Nausea and/or Vomiting    Vital Signs Last 24 Hrs  T(F): 98.3 (06 Mar 2020 05:42), Max: 100.1 (05 Mar 2020 20:48)  HR: 87 (06 Mar 2020 05:42) (85 - 96)  BP: 105/67 (06 Mar 2020 05:42) (93/61 - 112/58)  RR: 17 (06 Mar 2020 05:42) (16 - 18)  SpO2: 95% (06 Mar 2020 05:42) (86% - 97%)  I&O's Summary    05 Mar 2020 07:01  -  06 Mar 2020 07:00  --------------------------------------------------------  IN: 0 mL / OUT: 2550 mL / NET: -2550 mL      GENERAL: NAD.   HEAD:  Atraumatic, Normocephalic  EYES: EOMI, PERRLA, sclera clear  NECK: Supple  CHEST/LUNG: Clear to auscultation bilaterally; No wheeze  HEART: Regular rate and rhythm; No murmurs, rubs, or gallops  ABDOMEN: Soft, Nontender, Nondistended; Bowel sounds present  EXTREMITIES:  No clubbing, cyanosis. + mild left knee swelling (continuous improving). left thigh surgical dressing c/d/i  NEUROLOGY: non-focal. awake alert  SKIN: No rashes    LABS:                        10.4   9.06  )-----------( 228      ( 05 Mar 2020 06:30 )             31.3     03-05    137  |  102  |  17  ----------------------------<  110  3.9   |  31  |  0.56    Ca    9.0      05 Mar 2020 06:30        eGFR if Non : 107 mL/min/1.73M2 (03-05-20 @ 06:30)  eGFR if African American: 124 mL/min/1.73M2 (03-05-20 @ 06:30)      RADIOLOGY & ADDITIONAL TESTS    Care Discussed with Consultants/Other Providers: d/w ortho PA

## 2020-03-06 NOTE — DIETITIAN INITIAL EVALUATION ADULT. - OTHER INFO
70 yo male with PMH osteoarthritis, osteoporosis, BPH, glaucoma, seizure disorder, former smoker/ETOH use, chronic back pain on PO morphine presents to hospital with left intertrochanteric hip fx.  Patient tolerating his diet 100% noted. (+) Bm (3/6) , No edema noted. Surgical incision noted.

## 2020-03-06 NOTE — PROGRESS NOTE ADULT - SUBJECTIVE AND OBJECTIVE BOX
70 yo male POD # 6 s/p left hip DHS  pt has left femoral vein DVT, Lovenox was switched to Eliquis by medical team  no events noted over night  pt denies leg pain, n/v/f/c    Vital Signs Last 24 Hrs  T(C): 37.4 (06 Mar 2020 10:50), Max: 37.8 (05 Mar 2020 20:48)  T(F): 99.4 (06 Mar 2020 10:50), Max: 100.1 (05 Mar 2020 20:48)  HR: 87 (06 Mar 2020 10:50) (87 - 96)  BP: 99/60 (06 Mar 2020 10:50) (93/61 - 112/58)  BP(mean): 72 (05 Mar 2020 11:45) (72 - 72)  RR: 17 (06 Mar 2020 10:50) (17 - 18)  SpO2: 90% (06 Mar 2020 10:50) (86% - 97%)    pt seen and examined at bedside  a+ox3, nad, non toxic  nc/at  abd - soft, nd, nttp  RLE - strength, sensation, ROM wnl  LLE - thigh slightly warm and tense, nttp, dressing c/d/i, knee effusion much improved, no erythema or tenderness noted, soft calf, good dorsi/plantarflexion, foot is warm with good cap refill  gu - pt voiding on his own                          10.4   9.06  )-----------( 228      ( 05 Mar 2020 06:30 )             31.3   03-05    137  |  102  |  17  ----------------------------<  110<H>  3.9   |  31  |  0.56    Ca    9.0      05 Mar 2020 06:30

## 2020-03-06 NOTE — PROGRESS NOTE ADULT - ASSESSMENT
Physical Examination:  GENERAL:               Alert, Oriented, No acute distress.    HEENT:                   No JVD, Moist MM  PULM:                     Bilateral air entry, Clear to auscultation bilaterally, no significant sputum production, No Rales, No Rhonchi, No Wheezing  CVS:                         S1, S2,  No Murmur  ABD:                        Soft, nondistended, nontender, normoactive bowel sounds,   NEURO:                  Alert, oriented, interactive, nonfocal, follows commands  PSYC:                      Calm, + Insight.    Assessment  1. Chronic COPD likely as Ex smoker- suspect Mild/Asymptomatic  2. New LLE DVT - provoked despite being on Lovenox for dvt ppx.   3. MARY GRACE - not tolerated cpap / bipap  4. Mechanical fall with Left hip fx  5 Underlying Osteoporosis, BPH (benign prostatic hyperplasia), Back pain, Osteoarthritis    Plan  - currently on Eliquis as provoked will do  for 3-6 month   - Incentive spirometry  - Duoneb PRN  - OOB, PT  - monitor on RA  - dispo planning when able

## 2020-03-06 NOTE — PROGRESS NOTE ADULT - SUBJECTIVE AND OBJECTIVE BOX
Follow-up Pulmonary Progress Note  Chief Complaint : Fracture of unspecified part of neck of left femur, initial encounter for closed fracture        No new events overnight.  Denies SOB/CP.       Allergies :Flonase (Headache)  No Known Allergies      PAST MEDICAL & SURGICAL HISTORY:  Osteoporosis  BPH (benign prostatic hyperplasia)  Back pain  Osteoarthritis  History of back surgery      Medications:  MEDICATIONS  (STANDING):  apixaban 10 milliGRAM(s) Oral every 12 hours  ascorbic acid 500 milliGRAM(s) Oral daily  colchicine 0.6 milliGRAM(s) Oral two times a day  finasteride 5 milliGRAM(s) Oral daily  latanoprost 0.005% Ophthalmic Solution 1 Drop(s) Both EYES at bedtime  melatonin 3 milliGRAM(s) Oral at bedtime  mirabegron ER 50 milliGRAM(s) Oral daily  morphine ER Tablet 200 milliGRAM(s) Oral daily  morphine ER Tablet 100 milliGRAM(s) Oral at bedtime  phenytoin   Capsule 400 milliGRAM(s) Oral at bedtime  polyethylene glycol 3350 17 Gram(s) Oral daily  senna 2 Tablet(s) Oral at bedtime  tamsulosin 0.8 milliGRAM(s) Oral at bedtime    MEDICATIONS  (PRN):  acetaminophen   Tablet .. 650 milliGRAM(s) Oral every 6 hours PRN Temp greater or equal to 38C (100.4F)  ALBUTerol    90 MICROgram(s) HFA Inhaler 2 Puff(s) Inhalation every 6 hours PRN Shortness of Breath and/or Wheezing  lactulose Syrup 10 Gram(s) Oral daily PRN constipation  lidocaine   Patch 1 Patch Transdermal every 24 hours PRN pain  morphine  - Injectable 4 milliGRAM(s) IV Push every 3 hours PRN Severe Pain (7 - 10)  ondansetron Injectable 4 milliGRAM(s) IV Push every 6 hours PRN Nausea and/or Vomiting      LABS:                        10.4   9.06  )-----------( 228      ( 05 Mar 2020 06:30 )             31.3     03-05    137  |  102  |  17  ----------------------------<  110<H>  3.9   |  31  |  0.56    Ca    9.0      05 Mar 2020 06:30        VITALS:  T(C): 37.4 (03-06-20 @ 10:50), Max: 37.8 (03-05-20 @ 20:48)  T(F): 99.4 (03-06-20 @ 10:50), Max: 100.1 (03-05-20 @ 20:48)  HR: 87 (03-06-20 @ 10:50) (87 - 94)  BP: 99/60 (03-06-20 @ 10:50) (98/58 - 112/58)  BP(mean): --  ABP: --  ABP(mean): --  RR: 17 (03-06-20 @ 10:50) (17 - 18)  SpO2: 90% (03-06-20 @ 10:50) (86% - 96%)  CVP(mm Hg): --  CVP(cm H2O): --    Ins and Outs     03-05-20 @ 07:01  -  03-06-20 @ 07:00  --------------------------------------------------------  IN: 0 mL / OUT: 2550 mL / NET: -2550 mL    I&O's Detail    05 Mar 2020 07:01  -  06 Mar 2020 07:00  --------------------------------------------------------  IN:  Total IN: 0 mL    OUT:    Voided: 2550 mL  Total OUT: 2550 mL    Total NET: -2550 mL

## 2020-03-06 NOTE — CONSULT NOTE ADULT - ASSESSMENT
68 yo male with PMH osteoarthritis, osteoporosis, glaucoma, BPH, seizure disorder, former smoker/ETOH use, chronic back pain on PO morphine presented to hospital with left hip pain. Dx with left intertrochanteric . S/P  left hip DHS  Hospital course complicated with LLE DVT likely provoked DVT from recent surgery. Patient was on Lovenox, now switched to Eliquis. Agreed with anticoagulation, patient will need anticoagulation for three months.     Will follow.

## 2020-03-06 NOTE — PROGRESS NOTE ADULT - ASSESSMENT
70 yo male POD # 6 s/p left hip DHS  pt has left femoral vein DVT, Lovenox was switched to Eliquis by medical team  no events noted over night  pt denies leg pain, n/v/f/c    pt doing well    labs from 3/5 wnl, H/H stable    plan  - Eliquis for left leg DVT  - WBAT with PT  - analgesia prn  - awaiting placement in [facility]

## 2020-03-06 NOTE — CONSULT NOTE ADULT - SUBJECTIVE AND OBJECTIVE BOX
70 yo male with PMH osteoarthritis, osteoporosis, glaucoma, BPH, seizure disorder, former smoker/ETOH use, chronic back pain on PO morphine presents to hospital with left hip pain. Patient tripped over one step yesterday and fell, immediately felt pain and was unable to ambulate, he dragged himself to couch and slept on couch, this morning when he was still unable to ambulate due to pain he decided to call ambulance. Dx with left intertrochanteric fx. Hospital course complicated with LLE DVT likely provoked DVT from recent surgery. Patient was on Lovenox, now switched to Eliquis.       Hematology consult requested for DVT.      Review of Systems: REVIEW OF SYSTEMS:  CONSTITUTIONAL: No fever, weight loss, or fatigue  EYES: No eye pain, visual disturbances, or discharge  ENMT:  No difficulty hearing, tinnitus, vertigo; No sinus or throat pain  NECK: No neck pain or neck stiffness  RESPIRATORY: No cough, wheezing, chills or hemoptysis; No shortness of breath  CARDIOVASCULAR: No chest pain, palpitations, dizziness, or leg swelling  GASTROINTESTINAL: No abdominal pain, No nausea, vomiting, or hematemesis; No diarrhea or constipation  GENITOURINARY: No dysuria, frequency, hematuria, or incontinence  NEUROLOGICAL: +diminished sensation to right leg, chronic. No headaches, memory loss, loss of strength, or tremors  SKIN: No itching, burning, rashes, or lesions   MUSCULOSKELETAL: +Left hip pain, + low back pain.   PSYCHIATRIC: No depression, anxiety, mood swings, or difficulty sleeping   All other ROS reviewed and negative except as otherwise stated	      PAST MEDICAL HISTORY:  Back pain     BPH (benign prostatic hyperplasia)     Osteoarthritis     Osteoporosis.     PAST SURGICAL HISTORY:  History of back surgery.      MEDICATIONS  (STANDING):  apixaban 10 milliGRAM(s) Oral every 12 hours  ascorbic acid 500 milliGRAM(s) Oral daily  colchicine 0.6 milliGRAM(s) Oral two times a day  finasteride 5 milliGRAM(s) Oral daily  latanoprost 0.005% Ophthalmic Solution 1 Drop(s) Both EYES at bedtime  melatonin 3 milliGRAM(s) Oral at bedtime  mirabegron ER 50 milliGRAM(s) Oral daily  morphine ER Tablet 200 milliGRAM(s) Oral daily  morphine ER Tablet 100 milliGRAM(s) Oral at bedtime  phenytoin   Capsule 400 milliGRAM(s) Oral at bedtime  polyethylene glycol 3350 17 Gram(s) Oral daily  senna 2 Tablet(s) Oral at bedtime  tamsulosin 0.8 milliGRAM(s) Oral at bedtime    MEDICATIONS  (PRN):  acetaminophen   Tablet .. 650 milliGRAM(s) Oral every 6 hours PRN Temp greater or equal to 38C (100.4F)  ALBUTerol    90 MICROgram(s) HFA Inhaler 2 Puff(s) Inhalation every 6 hours PRN Shortness of Breath and/or Wheezing  lactulose Syrup 10 Gram(s) Oral daily PRN constipation  lidocaine   Patch 1 Patch Transdermal every 24 hours PRN pain  morphine  - Injectable 4 milliGRAM(s) IV Push every 3 hours PRN Severe Pain (7 - 10)  ondansetron Injectable 4 milliGRAM(s) IV Push every 6 hours PRN Nausea and/or Vomiting      ICU Vital Signs Last 24 Hrs  T(C): 37.4 (06 Mar 2020 10:50), Max: 37.8 (05 Mar 2020 20:48)  T(F): 99.4 (06 Mar 2020 10:50), Max: 100.1 (05 Mar 2020 20:48)  HR: 87 (06 Mar 2020 10:50) (87 - 92)  BP: 99/60 (06 Mar 2020 10:50) (98/58 - 112/58)  BP(mean): --  ABP: --  ABP(mean): --  RR: 17 (06 Mar 2020 10:50) (17 - 18)  SpO2: 90% (06 Mar 2020 10:50) (90% - 96%)      PHYSICAL EXAM:  	GENERAL: NAD, thin  	HEAD:  Atraumatic, Normocephalic  	EYES: EOMI, conjunctiva and sclera clear  	ENMT: Moist mucous membranes  	NECK: Supple, full ROM  	CHEST/LUNG: Clear to auscultation bilaterally, good air entry, non-labored breathing  	HEART: RRR; S1/S2, No murmur  	ABDOMEN: Soft, Nontender, Nondistended; Bowel sounds present  	VASCULAR: Normal pulses, Normal capillary refill, no edema.   	EXTREMITIES: No calf tenderness, No cyanosis. Left leg shortened, externally rotated. No swelling or bruising to hip. Tender to palpation left hip   	SKIN: Warm, Intact      Complete Blood Count + Automated Diff in AM (03.05.20 @ 06:30)    WBC Count: 9.06 K/uL    RBC Count: 3.25 M/uL    Hemoglobin: 10.4 g/dL    Hematocrit: 31.3 %    Mean Cell Volume: 96.3 fl    Mean Cell Hemoglobin: 32.0 pg    Mean Cell Hemoglobin Conc: 33.2 gm/dL    Red Cell Distrib Width: 13.0 %    Platelet Count - Automated: 228 K/uL    Auto Neutrophil #: 6.62 K/uL    Auto Lymphocyte #: 1.30 K/uL    Auto Monocyte #: 0.83 K/uL    Auto Eosinophil #: 0.23 K/uL    Auto Basophil #: 0.04 K/uL    Auto Neutrophil %: 73.2: Differential percentages must be correlated with absolute numbers for  clinical significance. %    Auto Lymphocyte %: 14.3 %    Auto Monocyte %: 9.2 %    Auto Eosinophil %: 2.5 %    Auto Basophil %: 0.4 %    Auto Immature Granulocyte %: 0.4 %    Nucleated RBC: 0 /100 WBCs        Basic Metabolic Panel in AM (03.05.20 @ 06:30)    Sodium, Serum: 137 mmol/L    Potassium, Serum: 3.9 mmol/L    Chloride, Serum: 102 mmol/L    Carbon Dioxide, Serum: 31 mmol/L    Anion Gap, Serum: 4 mmol/L    Blood Urea Nitrogen, Serum: 17 mg/dL    Creatinine, Serum: 0.56 mg/dL    Glucose, Serum: 110 mg/dL    Calcium, Total Serum: 9.0 mg/dL    eGFR if Non : 107: Interpretative comment  The units for eGFR are mL/min/1.73M2 (normalized body surface area). The  eGFR is calculated from a serum creatinine using the CKD-EPI equation.  Other variables required for calculation are race, age and sex. Among  patients with chronic kidney disease (CKD), the eGFR is useful in  determining the stage of disease according to KDOQI CKD classification.  All eGFR results are reported numerically with the following  interpretation.          GFR                    With                 Without     (ml/min/1.73 m2)    Kidney Damage       Kidney Damage        >= 90                    Stage 1                     Normal        60-89                    Stage 2                     Decreased GFR        30-59     Stage 3                     Stage 3        15-29                    Stage 4                     Stage 4        < 15                      Stage 5                     Stage 5  Each stage of CKD assumes that the associated GFR level has been in  effect for at least 3 months. Determination of stages one and two (with  eGFR > 59 ml/min/m2) requires estimation of kidney damage for at least 3  months as defined by structural or functional abnormalities.  Limitations: All estimates of GFR will be less accurate for patients at  extremes of muscle mass (including but not limited to frail elderly,  critically ill, or cancer patients), those with unusual diets, and those  with conditions associated with reduced secretion or extrarenal  elimination of creatinine. The eGFR equation is not recommended for use  in patients with unstable creatinine levels. mL/min/1.73M2    eGFR if African American: 124 mL/min/1.73M2          USG 3/3/20:      There is normal compressibility of the left common femoral, veins. Acute deep vein thrombosis from the upper femoral vein to the popliteal vein and into the calf veins.    The contralateral common femoral vein is patent.    IMPRESSION:     Acute deep vein thrombosis from the left upper femoral vein to the popliteal vein and into the calf veins confirmed as previously reported.

## 2020-03-07 PROCEDURE — 99232 SBSQ HOSP IP/OBS MODERATE 35: CPT

## 2020-03-07 RX ADMIN — Medication 0.6 MILLIGRAM(S): at 05:27

## 2020-03-07 RX ADMIN — MORPHINE SULFATE 100 MILLIGRAM(S): 50 CAPSULE, EXTENDED RELEASE ORAL at 22:00

## 2020-03-07 RX ADMIN — MORPHINE SULFATE 100 MILLIGRAM(S): 50 CAPSULE, EXTENDED RELEASE ORAL at 21:30

## 2020-03-07 RX ADMIN — Medication 400 MILLIGRAM(S): at 21:31

## 2020-03-07 RX ADMIN — APIXABAN 10 MILLIGRAM(S): 2.5 TABLET, FILM COATED ORAL at 17:30

## 2020-03-07 RX ADMIN — Medication 0.6 MILLIGRAM(S): at 17:30

## 2020-03-07 RX ADMIN — MIRABEGRON 50 MILLIGRAM(S): 50 TABLET, EXTENDED RELEASE ORAL at 11:44

## 2020-03-07 RX ADMIN — MORPHINE SULFATE 200 MILLIGRAM(S): 50 CAPSULE, EXTENDED RELEASE ORAL at 11:43

## 2020-03-07 RX ADMIN — MORPHINE SULFATE 4 MILLIGRAM(S): 50 CAPSULE, EXTENDED RELEASE ORAL at 14:10

## 2020-03-07 RX ADMIN — MORPHINE SULFATE 4 MILLIGRAM(S): 50 CAPSULE, EXTENDED RELEASE ORAL at 13:55

## 2020-03-07 RX ADMIN — POLYETHYLENE GLYCOL 3350 17 GRAM(S): 17 POWDER, FOR SOLUTION ORAL at 17:28

## 2020-03-07 RX ADMIN — TAMSULOSIN HYDROCHLORIDE 0.8 MILLIGRAM(S): 0.4 CAPSULE ORAL at 21:30

## 2020-03-07 RX ADMIN — Medication 3 MILLIGRAM(S): at 21:31

## 2020-03-07 RX ADMIN — FINASTERIDE 5 MILLIGRAM(S): 5 TABLET, FILM COATED ORAL at 11:43

## 2020-03-07 RX ADMIN — Medication 500 MILLIGRAM(S): at 11:43

## 2020-03-07 RX ADMIN — APIXABAN 10 MILLIGRAM(S): 2.5 TABLET, FILM COATED ORAL at 05:27

## 2020-03-07 RX ADMIN — LATANOPROST 1 DROP(S): 0.05 SOLUTION/ DROPS OPHTHALMIC; TOPICAL at 21:31

## 2020-03-07 RX ADMIN — MORPHINE SULFATE 200 MILLIGRAM(S): 50 CAPSULE, EXTENDED RELEASE ORAL at 12:30

## 2020-03-07 RX ADMIN — SENNA PLUS 2 TABLET(S): 8.6 TABLET ORAL at 21:31

## 2020-03-07 NOTE — PROGRESS NOTE ADULT - ASSESSMENT
Physical Examination:  GENERAL:               Alert, Oriented, No acute distress.    HEENT:                   No JVD, Moist MM  PULM:                     Bilateral air entry, Clear to auscultation bilaterally, no significant sputum production, No Rales, No Rhonchi, No Wheezing  CVS:                         S1, S2,  No Murmur  NEURO:                  Alert, oriented, interactive, nonfocal, follows commands  PSYC:                      Calm, + Insight.    Assessment  1. Chronic COPD likely as Ex smoker- suspect Mild/Asymptomatic  2. New LLE DVT - provoked despite being on Lovenox for dvt ppx.   3. MARY GRACE - not tolerated cpap / bipap  4. Mechanical fall with Left hip fx  5 Underlying Osteoporosis, BPH (benign prostatic hyperplasia), Back pain, Osteoarthritis    Plan  - currently on Eliquis as provoked will do  for 3-6 month   - Incentive spirometry  - Duoneb PRN  - OOB, PT  - monitor on RA  - dispo planning when able

## 2020-03-07 NOTE — PROGRESS NOTE ADULT - SUBJECTIVE AND OBJECTIVE BOX
Follow-up Pulmonary Progress Note  Chief Complaint : Fracture of unspecified part of neck of left femur, initial encounter for closed fracture      pt seen and examined  no respiratory complaints  no cp, palp, n/v        Allergies :Flonase (Headache)  No Known Allergies      PAST MEDICAL & SURGICAL HISTORY:  Osteoporosis  BPH (benign prostatic hyperplasia)  Back pain  Osteoarthritis  History of back surgery      Medications:  MEDICATIONS  (STANDING):  apixaban 10 milliGRAM(s) Oral every 12 hours  ascorbic acid 500 milliGRAM(s) Oral daily  colchicine 0.6 milliGRAM(s) Oral two times a day  finasteride 5 milliGRAM(s) Oral daily  latanoprost 0.005% Ophthalmic Solution 1 Drop(s) Both EYES at bedtime  melatonin 3 milliGRAM(s) Oral at bedtime  mirabegron ER 50 milliGRAM(s) Oral daily  morphine ER Tablet 100 milliGRAM(s) Oral at bedtime  phenytoin   Capsule 400 milliGRAM(s) Oral at bedtime  polyethylene glycol 3350 17 Gram(s) Oral daily  senna 2 Tablet(s) Oral at bedtime  tamsulosin 0.8 milliGRAM(s) Oral at bedtime    MEDICATIONS  (PRN):  acetaminophen   Tablet .. 650 milliGRAM(s) Oral every 6 hours PRN Temp greater or equal to 38C (100.4F)  ALBUTerol    90 MICROgram(s) HFA Inhaler 2 Puff(s) Inhalation every 6 hours PRN Shortness of Breath and/or Wheezing  lactulose Syrup 10 Gram(s) Oral daily PRN constipation  lidocaine   Patch 1 Patch Transdermal every 24 hours PRN pain  morphine  - Injectable 4 milliGRAM(s) IV Push every 3 hours PRN Severe Pain (7 - 10)  ondansetron Injectable 4 milliGRAM(s) IV Push every 6 hours PRN Nausea and/or Vomiting        VITALS:  T(C): 36.4 (03-07-20 @ 11:40), Max: 37 (03-06-20 @ 15:52)  T(F): 97.6 (03-07-20 @ 11:40), Max: 98.6 (03-06-20 @ 15:52)  HR: 82 (03-07-20 @ 11:40) (81 - 92)  BP: 98/54 (03-07-20 @ 11:40) (90/67 - 120/62)  BP(mean): 64 (03-07-20 @ 11:40) (64 - 73)  ABP: --  ABP(mean): --  RR: 15 (03-07-20 @ 11:40) (14 - 17)  SpO2: 93% (03-07-20 @ 11:40) (92% - 95%)  CVP(mm Hg): --  CVP(cm H2O): --    Ins and Outs     03-06-20 @ 07:01  -  03-07-20 @ 07:00  --------------------------------------------------------  IN: 300 mL / OUT: 1200 mL / NET: -900 mL                I&O's Detail    06 Mar 2020 07:01  -  07 Mar 2020 07:00  --------------------------------------------------------  IN:    Oral Fluid: 300 mL  Total IN: 300 mL    OUT:    Voided: 1200 mL  Total OUT: 1200 mL    Total NET: -900 mL

## 2020-03-07 NOTE — PROGRESS NOTE ADULT - SUBJECTIVE AND OBJECTIVE BOX
68 yo male POD # 7 s/p left hip DHS  no events noted over night  pt on Eliquis for left femoral vein DVT  pt walking with PT, denies pain, n/v/f/c    Vital Signs Last 24 Hrs  T(C): 36.4 (07 Mar 2020 11:40), Max: 36.9 (07 Mar 2020 00:44)  T(F): 97.6 (07 Mar 2020 11:40), Max: 98.4 (07 Mar 2020 00:44)  HR: 86 (07 Mar 2020 13:49) (81 - 92)  BP: 96/63 (07 Mar 2020 13:49) (90/67 - 120/62)  BP(mean): 71 (07 Mar 2020 13:49) (64 - 73)  RR: 16 (07 Mar 2020 13:49) (14 - 17)  SpO2: 95% (07 Mar 2020 13:49) (92% - 95%)    pt seen and examined at bedside  a+ox3, nad, non toxic  nc/at  abd - soft, nd, nttp  RLE - wnl  LLE - Aquacel dressing removed, incisions c/d/i, no ecchymoses, thigh is slightly tense, knee effusion is much improved, calf is soft, good dorsi/plantarflexion, foot is warm, good pulses   - pt voiding on his own

## 2020-03-07 NOTE — PROGRESS NOTE ADULT - ASSESSMENT
70 yo male POD # 7 s/p left hip DHS  no events noted over night  pt on Eliquis for left femoral vein DVT  pt walking with PT, denies pain, n/v/f/c    pt doing well from orthopaedic standpoint    plan  - continue Eliquis for left femoral vein provoked DVT  - WBAT with PT  - awaiting placement in [facility]

## 2020-03-07 NOTE — PROGRESS NOTE ADULT - SUBJECTIVE AND OBJECTIVE BOX
Patient is a 69y old  Male who presents with a chief complaint of left intertrochanteric fx (07 Mar 2020 12:34)    Patient seen and examined at bedside. No overnight events reported.     ALLERGIES:  Flonase (Headache)  No Known Allergies    MEDICATIONS  (STANDING):  apixaban 10 milliGRAM(s) Oral every 12 hours  ascorbic acid 500 milliGRAM(s) Oral daily  colchicine 0.6 milliGRAM(s) Oral two times a day  finasteride 5 milliGRAM(s) Oral daily  latanoprost 0.005% Ophthalmic Solution 1 Drop(s) Both EYES at bedtime  melatonin 3 milliGRAM(s) Oral at bedtime  mirabegron ER 50 milliGRAM(s) Oral daily  morphine ER Tablet 100 milliGRAM(s) Oral at bedtime  phenytoin   Capsule 400 milliGRAM(s) Oral at bedtime  polyethylene glycol 3350 17 Gram(s) Oral daily  senna 2 Tablet(s) Oral at bedtime  tamsulosin 0.8 milliGRAM(s) Oral at bedtime    MEDICATIONS  (PRN):  acetaminophen   Tablet .. 650 milliGRAM(s) Oral every 6 hours PRN Temp greater or equal to 38C (100.4F)  ALBUTerol    90 MICROgram(s) HFA Inhaler 2 Puff(s) Inhalation every 6 hours PRN Shortness of Breath and/or Wheezing  lactulose Syrup 10 Gram(s) Oral daily PRN constipation  lidocaine   Patch 1 Patch Transdermal every 24 hours PRN pain  morphine  - Injectable 4 milliGRAM(s) IV Push every 3 hours PRN Severe Pain (7 - 10)  ondansetron Injectable 4 milliGRAM(s) IV Push every 6 hours PRN Nausea and/or Vomiting    Vital Signs Last 24 Hrs  T(F): 97.6 (07 Mar 2020 11:40), Max: 98.6 (06 Mar 2020 15:52)  HR: 86 (07 Mar 2020 13:49) (81 - 92)  BP: 96/63 (07 Mar 2020 13:49) (90/67 - 120/62)  RR: 16 (07 Mar 2020 13:49) (14 - 17)  SpO2: 95% (07 Mar 2020 13:49) (92% - 95%)  I&O's Summary    06 Mar 2020 07:01  -  07 Mar 2020 07:00  --------------------------------------------------------  IN: 300 mL / OUT: 1200 mL / NET: -900 mL      PHYSICAL EXAM:  General: NAD, A/O x 3  ENT: Moist mucous membranes, no thrush  Neck: Supple, No JVD  Lungs: Clear to auscultation bilaterally, good air entry, non-labored breathing  Cardio: RRR, S1/S2, No murmur  Abdomen: Soft, Nontender, Nondistended; Bowel sounds present  Extremities: No calf tenderness, Left Lower Extremity 1+ pitting edema    LABS:                        10.4   9.06  )-----------( 228      ( 05 Mar 2020 06:30 )             31.3     03-05    137  |  102  |  17  ----------------------------<  110  3.9   |  31  |  0.56    Ca    9.0      05 Mar 2020 06:30    eGFR if Non : 107 mL/min/1.73M2 (03-05-20 @ 06:30)  eGFR if African American: 124 mL/min/1.73M2 (03-05-20 @ 06:30)    < from: US Duplex Venous Lower Ext Ltd, Left (03.03.20 @ 20:30) >  Acute deep vein thrombosis from the left upper femoral vein to the popliteal vein and into the calf veins confirmed as previously reported.    < end of copied text >                        RADIOLOGY & ADDITIONAL TESTS:    Care Discussed with Consultants/Other Providers:

## 2020-03-07 NOTE — PROGRESS NOTE ADULT - ASSESSMENT
Pt is a 70yo M admitted to [facility] s/p with left intertrochanteric fx. Hospital course complicated but Left Lower Extremity DVT likely provoked DVT from recent surgery.    #Left intertrochanteric fx s/p mechanical fall  s/p ORIF POD 7  pain management  ortho following   PT/OT eval  PM&R consult: Denied to acute rehab.  pending [facility]    # Left Lower Extremity DVT (extensive, multiple veins) likely provoked DVT from recent surgery  - venous doppler: left upper femoral vein to popiteal and calf veins  - switched to load dose Eliquis 10mg to (3/11), then Eliquis 5mg q12 for 3-6 months  - Dr. Reyes spoke to Dr. Sawyer from [facility] on the phone regarding clearance to rehab. Dr. Sawyer is not formally consulted, but gave his opinion as a courtesy. Based on the extent of pt's DVT, would delay discharge to the rehab for one week and rec hematology consult. Pt can still ambulate with PT while inpatient.   - DC on 3/9 to [facility]  - hematology consulted - agrees with Eliquis    #Possible pseudogout of left knee  -sono left knee showed moderate joint effusion.   - Left knee X ray: no acute fracture but did show  calcification of the menisci and joint effusion above the patella  - trial of colchicine. total 5 days - last date 3/9    #COPD without exacerbation -  stable, needs outpatient PFTs per pulm    #BPH:  cont Finasteride, tamsulosin     #History of Hep C s/p treatment  - hep c RNA non-detectable  - follow up out pt GI     #osteoporosis:   c/w Alendronate 70mg q week    # seizure disorder   - phenytoin qHS     #Chronic pain syndrome  - Takes PO morphine sulfate ER 200mg in morning, morphine sulfate IR 60mg in afternoon, and morphine sulfate ER 100mg at night   - Will keep standing morning and evening doses, c/w short acting doses for PRN breakthrough pain  - iSTOP previously reviewed by hospitalist team    # BPH:  c/w finasteride and BPH    #DVT ppx: Lovenox subq    DISPO: [facility] on 3/9 as per vascular surgeon

## 2020-03-08 PROCEDURE — 99232 SBSQ HOSP IP/OBS MODERATE 35: CPT

## 2020-03-08 RX ORDER — MORPHINE SULFATE 50 MG/1
200 CAPSULE, EXTENDED RELEASE ORAL DAILY
Refills: 0 | Status: DISCONTINUED | OUTPATIENT
Start: 2020-03-08 | End: 2020-03-10

## 2020-03-08 RX ORDER — MORPHINE SULFATE 50 MG/1
100 CAPSULE, EXTENDED RELEASE ORAL AT BEDTIME
Refills: 0 | Status: DISCONTINUED | OUTPATIENT
Start: 2020-03-08 | End: 2020-03-10

## 2020-03-08 RX ADMIN — MIRABEGRON 50 MILLIGRAM(S): 50 TABLET, EXTENDED RELEASE ORAL at 11:37

## 2020-03-08 RX ADMIN — Medication 0.6 MILLIGRAM(S): at 05:55

## 2020-03-08 RX ADMIN — SENNA PLUS 2 TABLET(S): 8.6 TABLET ORAL at 21:06

## 2020-03-08 RX ADMIN — MORPHINE SULFATE 100 MILLIGRAM(S): 50 CAPSULE, EXTENDED RELEASE ORAL at 21:07

## 2020-03-08 RX ADMIN — Medication 3 MILLIGRAM(S): at 21:07

## 2020-03-08 RX ADMIN — Medication 0.6 MILLIGRAM(S): at 17:03

## 2020-03-08 RX ADMIN — LATANOPROST 1 DROP(S): 0.05 SOLUTION/ DROPS OPHTHALMIC; TOPICAL at 21:07

## 2020-03-08 RX ADMIN — FINASTERIDE 5 MILLIGRAM(S): 5 TABLET, FILM COATED ORAL at 11:36

## 2020-03-08 RX ADMIN — Medication 500 MILLIGRAM(S): at 11:36

## 2020-03-08 RX ADMIN — APIXABAN 10 MILLIGRAM(S): 2.5 TABLET, FILM COATED ORAL at 17:02

## 2020-03-08 RX ADMIN — MORPHINE SULFATE 100 MILLIGRAM(S): 50 CAPSULE, EXTENDED RELEASE ORAL at 22:00

## 2020-03-08 RX ADMIN — POLYETHYLENE GLYCOL 3350 17 GRAM(S): 17 POWDER, FOR SOLUTION ORAL at 11:39

## 2020-03-08 RX ADMIN — APIXABAN 10 MILLIGRAM(S): 2.5 TABLET, FILM COATED ORAL at 05:55

## 2020-03-08 RX ADMIN — TAMSULOSIN HYDROCHLORIDE 0.8 MILLIGRAM(S): 0.4 CAPSULE ORAL at 21:07

## 2020-03-08 RX ADMIN — Medication 400 MILLIGRAM(S): at 21:07

## 2020-03-08 NOTE — PROGRESS NOTE ADULT - ASSESSMENT
Pt is a 69M admitted to [facility] s/p with left intertrochanteric fx. Hospital course complicated but Left Lower Extremity DVT likely provoked DVT from recent surgery    #Left intertrochanteric fx s/p mechanical fall  s/p ORIF POD 8  pain management  ortho following   PT/OT eval  PM&R consult: Denied to acute rehab.  pending [facility]    # Left Lower Extremity DVT (extensive, multiple veins) likely provoked DVT from recent surgery  - venous doppler: left upper femoral vein to popiteal and calf veins  - switched to load dose Eliquis 10mg to (3/11), then Eliquis 5mg q12 for 3-6 months  - Dr. Reyes spoke to Dr. Sawyer from [facility] on the phone regarding clearance to rehab. Dr. Sawyer is not formally consulted, but gave his opinion as a courtesy. Based on the extent of pt's DVT, would delay discharge to the rehab for one week and rec hematology consult. Pt can still ambulate with PT while inpatient.   - DC on 3/9 to [facility]  - hematology consulted - agrees with Eliquis    #Possible pseudogout of left knee  -sono left knee showed moderate joint effusion.   - Left knee X ray: no acute fracture but did show  calcification of the menisci and joint effusion above the patella  - trial of colchicine. total 5 days - last date 3/9    #COPD without exacerbation -  stable, needs outpatient PFTs per pulm    #BPH:  cont Finasteride, tamsulosin     #History of Hep C s/p treatment  - hep c RNA non-detectable  - follow up out pt GI     #osteoporosis:   c/w Alendronate 70mg q week    # seizure disorder   - phenytoin qHS     #Chronic pain syndrome  - Takes PO morphine sulfate ER 200mg in morning, morphine sulfate IR 60mg in afternoon, and morphine sulfate ER 100mg at night   - Will keep standing morning and evening doses, c/w short acting doses for PRN breakthrough pain  - iSTOP previously reviewed by hospitalist team    # BPH:  c/w finasteride and BPH    #DVT ppx: Lovenox subq    DISPO: [facility] tomorrow

## 2020-03-08 NOTE — PROGRESS NOTE ADULT - SUBJECTIVE AND OBJECTIVE BOX
Follow-up Critical Care Progress Note  Chief Complaint : Fracture of unspecified part of neck of left femur, initial encounter for closed fracture      pt feels well  no complaints  no cp, palp, n/v      Allergies :Flonase (Headache)  No Known Allergies      PAST MEDICAL & SURGICAL HISTORY:  Osteoporosis  BPH (benign prostatic hyperplasia)  Back pain  Osteoarthritis  History of back surgery      Medications:  MEDICATIONS  (STANDING):  apixaban 10 milliGRAM(s) Oral every 12 hours  ascorbic acid 500 milliGRAM(s) Oral daily  colchicine 0.6 milliGRAM(s) Oral two times a day  finasteride 5 milliGRAM(s) Oral daily  latanoprost 0.005% Ophthalmic Solution 1 Drop(s) Both EYES at bedtime  melatonin 3 milliGRAM(s) Oral at bedtime  mirabegron ER 50 milliGRAM(s) Oral daily  phenytoin   Capsule 400 milliGRAM(s) Oral at bedtime  polyethylene glycol 3350 17 Gram(s) Oral daily  senna 2 Tablet(s) Oral at bedtime  tamsulosin 0.8 milliGRAM(s) Oral at bedtime    MEDICATIONS  (PRN):  acetaminophen   Tablet .. 650 milliGRAM(s) Oral every 6 hours PRN Temp greater or equal to 38C (100.4F)  ALBUTerol    90 MICROgram(s) HFA Inhaler 2 Puff(s) Inhalation every 6 hours PRN Shortness of Breath and/or Wheezing  lactulose Syrup 10 Gram(s) Oral daily PRN constipation  lidocaine   Patch 1 Patch Transdermal every 24 hours PRN pain  ondansetron Injectable 4 milliGRAM(s) IV Push every 6 hours PRN Nausea and/or Vomiting      VITALS:  T(C): 36.9 (03-08-20 @ 09:17), Max: 37.2 (03-07-20 @ 20:42)  T(F): 98.5 (03-08-20 @ 09:17), Max: 99 (03-08-20 @ 00:57)  HR: 80 (03-08-20 @ 09:17) (79 - 92)  BP: 102/65 (03-08-20 @ 09:17) (100/57 - 111/59)  BP(mean): --  ABP: --  ABP(mean): --  RR: 16 (03-08-20 @ 09:17) (14 - 16)  SpO2: 95% (03-08-20 @ 09:17) (91% - 95%)  CVP(mm Hg): --  CVP(cm H2O): --    Ins and Outs     03-07-20 @ 06:01  -  03-08-20 @ 07:00  --------------------------------------------------------  IN: 0 mL / OUT: 650 mL / NET: -650 mL                I&O's Detail    07 Mar 2020 06:01  -  08 Mar 2020 07:00  --------------------------------------------------------  IN:  Total IN: 0 mL    OUT:    Voided: 650 mL  Total OUT: 650 mL    Total NET: -650 mL

## 2020-03-08 NOTE — PROGRESS NOTE ADULT - ASSESSMENT
surgically stable   Eliquis for DVT left leg  hx of COPD, OA/OP, bph , seizure disorder       Plan :  PT WBAT Left leg            Eliquis forleft leg  DVT            rehab planning for Monday or Tuesday

## 2020-03-08 NOTE — PROGRESS NOTE ADULT - SUBJECTIVE AND OBJECTIVE BOX
POD #9  S/P left hip DHS    Patient was seen and examined by me today along with DR Coleman.  Patient is without complaints and there   were no acute events overnight.  Patient has been ortho stable but his d/c to rehab has been complicated by  Left DVT.  He denies CP or SOB.    Vital Signs Last 24 Hrs  T(C): 36.9 (08 Mar 2020 09:17), Max: 37.2 (07 Mar 2020 20:42)  T(F): 98.5 (08 Mar 2020 09:17), Max: 99 (08 Mar 2020 00:57)  HR: 80 (08 Mar 2020 09:17) (79 - 92)  BP: 102/65 (08 Mar 2020 09:17) (100/57 - 111/59)  BP(mean): --  RR: 16 (08 Mar 2020 09:17) (14 - 16)  SpO2: 95% (08 Mar 2020 09:17) (91% - 95%)    PE:  Alert and oriented x3  Lungs:  CTA   Cor:  S1 S2   Abd:  soft, non tender , +BS , voiding   Ext:  Left hip incision clean and dry.. staples in place.  There is no drainage or erythema noted.        thigh  supple. + neurovascular intact.         No edema or pain noted in lower extremities

## 2020-03-08 NOTE — PROGRESS NOTE ADULT - SUBJECTIVE AND OBJECTIVE BOX
Patient is a 69y old  Male who presents with a chief complaint of left intertrochanteric fx (07 Mar 2020 16:32)    Patient seen and examined at bedside. No overnight events reported.     ALLERGIES:  Flonase (Headache)  No Known Allergies    MEDICATIONS  (STANDING):  apixaban 10 milliGRAM(s) Oral every 12 hours  ascorbic acid 500 milliGRAM(s) Oral daily  colchicine 0.6 milliGRAM(s) Oral two times a day  finasteride 5 milliGRAM(s) Oral daily  latanoprost 0.005% Ophthalmic Solution 1 Drop(s) Both EYES at bedtime  melatonin 3 milliGRAM(s) Oral at bedtime  mirabegron ER 50 milliGRAM(s) Oral daily  phenytoin   Capsule 400 milliGRAM(s) Oral at bedtime  polyethylene glycol 3350 17 Gram(s) Oral daily  senna 2 Tablet(s) Oral at bedtime  tamsulosin 0.8 milliGRAM(s) Oral at bedtime    MEDICATIONS  (PRN):  acetaminophen   Tablet .. 650 milliGRAM(s) Oral every 6 hours PRN Temp greater or equal to 38C (100.4F)  ALBUTerol    90 MICROgram(s) HFA Inhaler 2 Puff(s) Inhalation every 6 hours PRN Shortness of Breath and/or Wheezing  lactulose Syrup 10 Gram(s) Oral daily PRN constipation  lidocaine   Patch 1 Patch Transdermal every 24 hours PRN pain  ondansetron Injectable 4 milliGRAM(s) IV Push every 6 hours PRN Nausea and/or Vomiting    Vital Signs Last 24 Hrs  T(F): 98.5 (08 Mar 2020 09:17), Max: 99 (08 Mar 2020 00:57)  HR: 80 (08 Mar 2020 09:17) (79 - 92)  BP: 102/65 (08 Mar 2020 09:17) (100/57 - 111/59)  RR: 16 (08 Mar 2020 09:17) (14 - 16)  SpO2: 95% (08 Mar 2020 09:17) (91% - 95%)  I&O's Summary    07 Mar 2020 06:01  -  08 Mar 2020 07:00  --------------------------------------------------------  IN: 0 mL / OUT: 650 mL / NET: -650 mL      PHYSICAL EXAM:  General: NAD, A/O x 3  ENT: Moist mucous membranes, no thrush  Neck: Supple, No JVD  Lungs: Clear to auscultation bilaterally, good air entry, non-labored breathing  Cardio: RRR, S1/S2, No murmur  Abdomen: Soft, Nontender, Nondistended; Bowel sounds present  Extremities: No calf tenderness, Left Lower Extremity 1+ pitting edema, left hip staples c/d/i

## 2020-03-09 PROCEDURE — 99232 SBSQ HOSP IP/OBS MODERATE 35: CPT

## 2020-03-09 RX ORDER — MORPHINE SULFATE 50 MG/1
4 CAPSULE, EXTENDED RELEASE ORAL EVERY 4 HOURS
Refills: 0 | Status: DISCONTINUED | OUTPATIENT
Start: 2020-03-09 | End: 2020-03-10

## 2020-03-09 RX ADMIN — MORPHINE SULFATE 200 MILLIGRAM(S): 50 CAPSULE, EXTENDED RELEASE ORAL at 13:45

## 2020-03-09 RX ADMIN — Medication 0.6 MILLIGRAM(S): at 18:04

## 2020-03-09 RX ADMIN — TAMSULOSIN HYDROCHLORIDE 0.8 MILLIGRAM(S): 0.4 CAPSULE ORAL at 22:17

## 2020-03-09 RX ADMIN — MORPHINE SULFATE 100 MILLIGRAM(S): 50 CAPSULE, EXTENDED RELEASE ORAL at 22:45

## 2020-03-09 RX ADMIN — SENNA PLUS 2 TABLET(S): 8.6 TABLET ORAL at 22:17

## 2020-03-09 RX ADMIN — MORPHINE SULFATE 200 MILLIGRAM(S): 50 CAPSULE, EXTENDED RELEASE ORAL at 12:46

## 2020-03-09 RX ADMIN — APIXABAN 10 MILLIGRAM(S): 2.5 TABLET, FILM COATED ORAL at 06:12

## 2020-03-09 RX ADMIN — Medication 3 MILLIGRAM(S): at 22:15

## 2020-03-09 RX ADMIN — MORPHINE SULFATE 4 MILLIGRAM(S): 50 CAPSULE, EXTENDED RELEASE ORAL at 09:23

## 2020-03-09 RX ADMIN — Medication 400 MILLIGRAM(S): at 22:16

## 2020-03-09 RX ADMIN — MORPHINE SULFATE 4 MILLIGRAM(S): 50 CAPSULE, EXTENDED RELEASE ORAL at 09:40

## 2020-03-09 RX ADMIN — POLYETHYLENE GLYCOL 3350 17 GRAM(S): 17 POWDER, FOR SOLUTION ORAL at 12:47

## 2020-03-09 RX ADMIN — Medication 0.6 MILLIGRAM(S): at 06:12

## 2020-03-09 RX ADMIN — MORPHINE SULFATE 100 MILLIGRAM(S): 50 CAPSULE, EXTENDED RELEASE ORAL at 22:15

## 2020-03-09 RX ADMIN — LATANOPROST 1 DROP(S): 0.05 SOLUTION/ DROPS OPHTHALMIC; TOPICAL at 22:16

## 2020-03-09 RX ADMIN — FINASTERIDE 5 MILLIGRAM(S): 5 TABLET, FILM COATED ORAL at 12:47

## 2020-03-09 RX ADMIN — MIRABEGRON 50 MILLIGRAM(S): 50 TABLET, EXTENDED RELEASE ORAL at 12:46

## 2020-03-09 RX ADMIN — Medication 500 MILLIGRAM(S): at 12:47

## 2020-03-09 RX ADMIN — APIXABAN 10 MILLIGRAM(S): 2.5 TABLET, FILM COATED ORAL at 18:05

## 2020-03-09 NOTE — PROGRESS NOTE ADULT - SUBJECTIVE AND OBJECTIVE BOX
Follow-up Pulmonology Progress Note  Chief Complaint : Fracture of unspecified part of neck of left femur, initial encounter for closed fracture        pt feels well, no complaints      Allergies :Flonase (Headache)  No Known Allergies      PAST MEDICAL & SURGICAL HISTORY:  Osteoporosis  BPH (benign prostatic hyperplasia)  Back pain  Osteoarthritis  History of back surgery      Medications:  MEDICATIONS  (STANDING):  apixaban 10 milliGRAM(s) Oral every 12 hours  ascorbic acid 500 milliGRAM(s) Oral daily  colchicine 0.6 milliGRAM(s) Oral two times a day  finasteride 5 milliGRAM(s) Oral daily  latanoprost 0.005% Ophthalmic Solution 1 Drop(s) Both EYES at bedtime  melatonin 3 milliGRAM(s) Oral at bedtime  mirabegron ER 50 milliGRAM(s) Oral daily  morphine ER Tablet 100 milliGRAM(s) Oral at bedtime  morphine ER Tablet 200 milliGRAM(s) Oral daily  phenytoin   Capsule 400 milliGRAM(s) Oral at bedtime  polyethylene glycol 3350 17 Gram(s) Oral daily  senna 2 Tablet(s) Oral at bedtime  tamsulosin 0.8 milliGRAM(s) Oral at bedtime    MEDICATIONS  (PRN):  acetaminophen   Tablet .. 650 milliGRAM(s) Oral every 6 hours PRN Temp greater or equal to 38C (100.4F)  ALBUTerol    90 MICROgram(s) HFA Inhaler 2 Puff(s) Inhalation every 6 hours PRN Shortness of Breath and/or Wheezing  lactulose Syrup 10 Gram(s) Oral daily PRN constipation  lidocaine   Patch 1 Patch Transdermal every 24 hours PRN pain  morphine  - Injectable 4 milliGRAM(s) IV Push every 4 hours PRN Severe Pain (7 - 10)  ondansetron Injectable 4 milliGRAM(s) IV Push every 6 hours PRN Nausea and/or Vomiting          VITALS:  T(C): 36.8 (03-09-20 @ 05:12), Max: 37.1 (03-08-20 @ 20:52)  T(F): 98.3 (03-09-20 @ 05:12), Max: 98.7 (03-08-20 @ 20:52)  HR: 77 (03-09-20 @ 05:12) (77 - 91)  BP: 100/60 (03-09-20 @ 05:12) (100/60 - 110/58)  BP(mean): --  ABP: --  ABP(mean): --  RR: 16 (03-09-20 @ 05:12) (16 - 16)  SpO2: 96% (03-09-20 @ 05:12) (95% - 96%)  CVP(mm Hg): --  CVP(cm H2O): --    Ins and Outs     03-08-20 @ 07:01  -  03-09-20 @ 07:00  --------------------------------------------------------  IN: 0 mL / OUT: 2100 mL / NET: -2100 mL    03-09-20 @ 07:01  -  03-09-20 @ 11:06  --------------------------------------------------------  IN: 360 mL / OUT: 400 mL / NET: -40 mL                I&O's Detail    08 Mar 2020 07:01  -  09 Mar 2020 07:00  --------------------------------------------------------  IN:  Total IN: 0 mL    OUT:    Voided: 2100 mL  Total OUT: 2100 mL    Total NET: -2100 mL      09 Mar 2020 07:01  -  09 Mar 2020 11:06  --------------------------------------------------------  IN:    Oral Fluid: 360 mL  Total IN: 360 mL    OUT:    Voided: 400 mL  Total OUT: 400 mL    Total NET: -40 mL

## 2020-03-09 NOTE — PROGRESS NOTE ADULT - ASSESSMENT
Surgically stable   labs stable'  DVT left leg on therapeutic Eliquis   hx of copd, OA, BPH, seizure disorder    Plan:  Continue PT WBAT left leg            Follow up with Dr Coleman  after Mar. 15 for staple removal            Continue Eliquis for treatment of DVT            Halima when medically cleared and authorization received

## 2020-03-09 NOTE — PROGRESS NOTE ADULT - SUBJECTIVE AND OBJECTIVE BOX
POD #8    S/P ORIF Left Hip with DHS    Patient was seen and examined by me.  There were no new events noted overnight.  Patient is still c/o surgical   discomfort and chronic back pain.      Vital Signs Last 24 Hrs  T(C): 36.9 (09 Mar 2020 11:10), Max: 37.1 (08 Mar 2020 20:52)  T(F): 98.4 (09 Mar 2020 11:10), Max: 98.7 (08 Mar 2020 20:52)  HR: 81 (09 Mar 2020 11:10) (77 - 91)  BP: 107/63 (09 Mar 2020 11:10) (100/60 - 110/58)  BP(mean): --  RR: 16 (09 Mar 2020 11:10) (16 - 16)  SpO2: 96% (09 Mar 2020 11:10) (95% - 96%)    PE:    Alert and oriented x3  Lungs:  CTA   Cor:  S1S2 w/o murmurs appreciated   Abd:  soft, non tender +BS+BM , voiding   Ext:  Left Hip Incision clean and dry , staples in place, thigh soft, + neurovascular intact          foot warm, good capillary refill   SCD 's bilateral while in bed

## 2020-03-09 NOTE — PROGRESS NOTE ADULT - ASSESSMENT
Pt is a 69M admitted to [facility] s/p with left intertrochanteric fx. Hospital course complicated but Left Lower Extremity DVT likely provoked DVT from recent surgery    Left intertrochanteric fx s/p mechanical fall s/p ORIF   - POD 9  - pain control   - PM&R consult: Denied to acute rehab.  - pending [facility] placement    Left Lower Extremity DVT (extensive, multiple veins) likely provoked DVT from recent surgery  - venous doppler: left upper femoral vein to popiteal and calf veins  - switched to load dose Eliquis 10mg to (3/11), then Eliquis 5mg q12 for 3-6 months  - DC on 3/9 to [facility]  - hematology following - agrees with Eliquis    Possible pseudogout of left knee  - sono left knee showed moderate joint effusion  - Left knee X ray: no acute fracture but did show  calcification of the menisci and joint effusion above the patella  - last day of colchicine    COPD   -  stable, needs outpatient PFTs per pulm    BPH  cont Finasteride, tamsulosin     osteoporosis  - cont Alendronate 70mg q week    seizure disorder   - phenytoin qHS     Chronic pain syndrome  - Takes PO morphine sulfate ER 200mg in morning, morphine sulfate IR 60mg in afternoon, and morphine sulfate ER 100mg at night   - Will keep standing morning and evening doses, c/w short acting doses for PRN breakthrough pain  - iSTOP previously reviewed by hospitalist team    BPH  - cont finasteride and BPH    DVT ppx:   - eliquis   Dispo: [facility] today

## 2020-03-09 NOTE — PROGRESS NOTE ADULT - SUBJECTIVE AND OBJECTIVE BOX
Patient is a 69y old  Male who presents with a chief complaint of left intertrochanteric fx (08 Mar 2020 15:57)      Patient seen and examined at bedside.    ALLERGIES:  Flonase (Headache)  No Known Allergies    MEDICATIONS  (STANDING):  apixaban 10 milliGRAM(s) Oral every 12 hours  ascorbic acid 500 milliGRAM(s) Oral daily  colchicine 0.6 milliGRAM(s) Oral two times a day  finasteride 5 milliGRAM(s) Oral daily  latanoprost 0.005% Ophthalmic Solution 1 Drop(s) Both EYES at bedtime  melatonin 3 milliGRAM(s) Oral at bedtime  mirabegron ER 50 milliGRAM(s) Oral daily  morphine ER Tablet 100 milliGRAM(s) Oral at bedtime  morphine ER Tablet 200 milliGRAM(s) Oral daily  phenytoin   Capsule 400 milliGRAM(s) Oral at bedtime  polyethylene glycol 3350 17 Gram(s) Oral daily  senna 2 Tablet(s) Oral at bedtime  tamsulosin 0.8 milliGRAM(s) Oral at bedtime    MEDICATIONS  (PRN):  acetaminophen   Tablet .. 650 milliGRAM(s) Oral every 6 hours PRN Temp greater or equal to 38C (100.4F)  ALBUTerol    90 MICROgram(s) HFA Inhaler 2 Puff(s) Inhalation every 6 hours PRN Shortness of Breath and/or Wheezing  lactulose Syrup 10 Gram(s) Oral daily PRN constipation  lidocaine   Patch 1 Patch Transdermal every 24 hours PRN pain  ondansetron Injectable 4 milliGRAM(s) IV Push every 6 hours PRN Nausea and/or Vomiting    Vital Signs Last 24 Hrs  T(F): 98.3 (09 Mar 2020 05:12), Max: 98.7 (08 Mar 2020 20:52)  HR: 77 (09 Mar 2020 05:12) (77 - 92)  BP: 100/60 (09 Mar 2020 05:12) (100/60 - 110/58)  RR: 16 (09 Mar 2020 05:12) (16 - 16)  SpO2: 96% (09 Mar 2020 05:12) (95% - 96%)  I&O's Summary    08 Mar 2020 07:01  -  09 Mar 2020 07:00  --------------------------------------------------------  IN: 0 mL / OUT: 2100 mL / NET: -2100 mL        PHYSICAL EXAM:  General: NAD, A/O x 3  ENT: MMM  Neck: Supple, No JVD  Lungs: Clear to auscultation bilaterally  Cardio: RRR, S1/S2, No murmurs  Abdomen: Soft, Nontender, Nondistended; Bowel sounds present  Extremities: No calf tenderness, No pitting edema    LABS:    RADIOLOGY & ADDITIONAL TESTS:    Care Discussed with Consultants/Other Providers: YES

## 2020-03-10 ENCOUNTER — TRANSCRIPTION ENCOUNTER (OUTPATIENT)
Age: 70
End: 2020-03-10

## 2020-03-10 VITALS
RESPIRATION RATE: 16 BRPM | SYSTOLIC BLOOD PRESSURE: 114 MMHG | DIASTOLIC BLOOD PRESSURE: 56 MMHG | HEART RATE: 85 BPM | OXYGEN SATURATION: 95 % | TEMPERATURE: 98 F

## 2020-03-10 PROCEDURE — 93005 ELECTROCARDIOGRAM TRACING: CPT

## 2020-03-10 PROCEDURE — 85027 COMPLETE CBC AUTOMATED: CPT

## 2020-03-10 PROCEDURE — 73562 X-RAY EXAM OF KNEE 3: CPT

## 2020-03-10 PROCEDURE — 99285 EMERGENCY DEPT VISIT HI MDM: CPT | Mod: 25

## 2020-03-10 PROCEDURE — 80048 BASIC METABOLIC PNL TOTAL CA: CPT

## 2020-03-10 PROCEDURE — 97161 PT EVAL LOW COMPLEX 20 MIN: CPT

## 2020-03-10 PROCEDURE — 97116 GAIT TRAINING THERAPY: CPT

## 2020-03-10 PROCEDURE — 80053 COMPREHEN METABOLIC PANEL: CPT

## 2020-03-10 PROCEDURE — C1713: CPT

## 2020-03-10 PROCEDURE — 76882 US LMTD JT/FCL EVL NVASC XTR: CPT

## 2020-03-10 PROCEDURE — 83735 ASSAY OF MAGNESIUM: CPT

## 2020-03-10 PROCEDURE — C1889: CPT

## 2020-03-10 PROCEDURE — 85730 THROMBOPLASTIN TIME PARTIAL: CPT

## 2020-03-10 PROCEDURE — 76000 FLUOROSCOPY <1 HR PHYS/QHP: CPT

## 2020-03-10 PROCEDURE — 71045 X-RAY EXAM CHEST 1 VIEW: CPT

## 2020-03-10 PROCEDURE — 86803 HEPATITIS C AB TEST: CPT

## 2020-03-10 PROCEDURE — 86901 BLOOD TYPING SEROLOGIC RH(D): CPT

## 2020-03-10 PROCEDURE — 93971 EXTREMITY STUDY: CPT

## 2020-03-10 PROCEDURE — 86900 BLOOD TYPING SEROLOGIC ABO: CPT

## 2020-03-10 PROCEDURE — 36415 COLL VENOUS BLD VENIPUNCTURE: CPT

## 2020-03-10 PROCEDURE — 73502 X-RAY EXAM HIP UNI 2-3 VIEWS: CPT

## 2020-03-10 PROCEDURE — 99239 HOSP IP/OBS DSCHRG MGMT >30: CPT

## 2020-03-10 PROCEDURE — 36000 PLACE NEEDLE IN VEIN: CPT

## 2020-03-10 PROCEDURE — 85610 PROTHROMBIN TIME: CPT

## 2020-03-10 PROCEDURE — 86850 RBC ANTIBODY SCREEN: CPT

## 2020-03-10 PROCEDURE — 87521 HEPATITIS C PROBE&RVRS TRNSC: CPT

## 2020-03-10 RX ORDER — SENNA PLUS 8.6 MG/1
2 TABLET ORAL
Qty: 0 | Refills: 0 | DISCHARGE
Start: 2020-03-10

## 2020-03-10 RX ORDER — POLYETHYLENE GLYCOL 3350 17 G/17G
17 POWDER, FOR SOLUTION ORAL
Qty: 0 | Refills: 0 | DISCHARGE
Start: 2020-03-10

## 2020-03-10 RX ORDER — APIXABAN 2.5 MG/1
2 TABLET, FILM COATED ORAL
Qty: 0 | Refills: 0 | DISCHARGE
Start: 2020-03-10 | End: 2020-03-16

## 2020-03-10 RX ORDER — TRAMADOL HYDROCHLORIDE 50 MG/1
25 TABLET ORAL ONCE
Refills: 0 | Status: DISCONTINUED | OUTPATIENT
Start: 2020-03-10 | End: 2020-03-10

## 2020-03-10 RX ADMIN — MORPHINE SULFATE 200 MILLIGRAM(S): 50 CAPSULE, EXTENDED RELEASE ORAL at 11:08

## 2020-03-10 RX ADMIN — MORPHINE SULFATE 200 MILLIGRAM(S): 50 CAPSULE, EXTENDED RELEASE ORAL at 13:58

## 2020-03-10 RX ADMIN — Medication 500 MILLIGRAM(S): at 11:08

## 2020-03-10 RX ADMIN — MORPHINE SULFATE 4 MILLIGRAM(S): 50 CAPSULE, EXTENDED RELEASE ORAL at 08:40

## 2020-03-10 RX ADMIN — MORPHINE SULFATE 4 MILLIGRAM(S): 50 CAPSULE, EXTENDED RELEASE ORAL at 08:30

## 2020-03-10 RX ADMIN — FINASTERIDE 5 MILLIGRAM(S): 5 TABLET, FILM COATED ORAL at 11:09

## 2020-03-10 RX ADMIN — TRAMADOL HYDROCHLORIDE 25 MILLIGRAM(S): 50 TABLET ORAL at 18:30

## 2020-03-10 RX ADMIN — APIXABAN 10 MILLIGRAM(S): 2.5 TABLET, FILM COATED ORAL at 06:21

## 2020-03-10 RX ADMIN — POLYETHYLENE GLYCOL 3350 17 GRAM(S): 17 POWDER, FOR SOLUTION ORAL at 11:10

## 2020-03-10 RX ADMIN — APIXABAN 10 MILLIGRAM(S): 2.5 TABLET, FILM COATED ORAL at 17:38

## 2020-03-10 RX ADMIN — MIRABEGRON 50 MILLIGRAM(S): 50 TABLET, EXTENDED RELEASE ORAL at 11:08

## 2020-03-10 NOTE — PROGRESS NOTE ADULT - ASSESSMENT
Pt is a 69M admitted to [facility] s/p with left intertrochanteric fx. Hospital course complicated but Left Lower Extremity DVT likely provoked DVT from recent surgery    Left intertrochanteric fx s/p mechanical fall s/p ORIF   - POD 10  - pain control   - PM&R consult: Denied to acute rehab.  - pending [facility] placement    Left Lower Extremity DVT (extensive, multiple veins) likely provoked DVT from recent surgery  - venous doppler: left upper femoral vein to popiteal and calf veins  - Continue Eliquis x 3-6months    Possible pseudogout of left knee  - Resolved  - sono left knee showed moderate joint effusion  - Left knee X ray: no acute fracture but did show  calcification of the menisci and joint effusion above the patella  - S/p colchicine    COPD   -  stable, needs outpatient PFTs per pulm    BPH  cont Finasteride, tamsulosin     osteoporosis  - cont Alendronate 70mg q week    seizure disorder   - phenytoin qHS     Chronic pain syndrome  - Takes PO morphine sulfate ER 200mg in morning, morphine sulfate IR 60mg in afternoon, and morphine sulfate ER 100mg at night   - Will keep standing morning and evening doses, c/w short acting doses for PRN breakthrough pain  - iSTOP previously reviewed by hospitalist team    BPH  - cont finasteride and BPH    DVT ppx:   - eliquis   Dispo: [facility]

## 2020-03-10 NOTE — DISCHARGE NOTE PROVIDER - HOSPITAL COURSE
Pt is a 69M admitted to [facility] s/p with left intertrochanteric fx s/p ORIF POD 10. WBAT per orthopedics. Hospital course complicated but Left Lower Extremity DVT likely provoked DVT from recent surgery, currently on Eliquis. Continue loading dose 10mg BID x 10days and then 5mg BID for 3-6months.     Pt medically stable for discharge to [facility].         Left intertrochanteric fx s/p mechanical fall s/p ORIF     - POD 10    - pain control         Left Lower Extremity DVT (extensive, multiple veins) likely provoked DVT from recent surgery    - venous doppler: left upper femoral vein to popiteal and calf veins    - Continue Eliquis x 3-6months        Possible pseudogout of left knee    - Resolved    - sono left knee showed moderate joint effusion    - Left knee X ray: no acute fracture but did show  calcification of the menisci and joint effusion above the patella    - S/p colchicine        COPD     -  stable, needs outpatient PFTs per pulm        BPH    cont Finasteride, tamsulosin         osteoporosis    - cont Alendronate 70mg q week        seizure disorder     - phenytoin qHS         Chronic pain syndrome    - Takes PO morphine sulfate ER 200mg in morning, morphine sulfate IR 60mg in afternoon, and morphine sulfate ER 100mg at night     - Will keep standing morning and evening doses, c/w short acting doses for PRN breakthrough pain        BPH    - cont finasteride and BPH

## 2020-03-10 NOTE — PROGRESS NOTE ADULT - SUBJECTIVE AND OBJECTIVE BOX
Frederic Saul M.D. Pager Number [phone]    Patient is a 69y old  Male who presents with a chief complaint of left intertrochanteric fx (10 Mar 2020 10:33)      SUBJECTIVE / OVERNIGHT EVENTS:  Pt seen and examined at bedside. No acute events overnight. Continues to endorse LLE pain  Pt denies cp, palpitations, sob, abd pain, N/V, fever, chills.    ROS:  All other review of systems negative    Allergies    No Known Allergies    Intolerances    Flonase (Headache)      MEDICATIONS  (STANDING):  apixaban 10 milliGRAM(s) Oral every 12 hours  ascorbic acid 500 milliGRAM(s) Oral daily  finasteride 5 milliGRAM(s) Oral daily  latanoprost 0.005% Ophthalmic Solution 1 Drop(s) Both EYES at bedtime  melatonin 3 milliGRAM(s) Oral at bedtime  mirabegron ER 50 milliGRAM(s) Oral daily  morphine ER Tablet 100 milliGRAM(s) Oral at bedtime  morphine ER Tablet 200 milliGRAM(s) Oral daily  phenytoin   Capsule 400 milliGRAM(s) Oral at bedtime  polyethylene glycol 3350 17 Gram(s) Oral daily  senna 2 Tablet(s) Oral at bedtime  tamsulosin 0.8 milliGRAM(s) Oral at bedtime    MEDICATIONS  (PRN):  acetaminophen   Tablet .. 650 milliGRAM(s) Oral every 6 hours PRN Temp greater or equal to 38C (100.4F)  ALBUTerol    90 MICROgram(s) HFA Inhaler 2 Puff(s) Inhalation every 6 hours PRN Shortness of Breath and/or Wheezing  lactulose Syrup 10 Gram(s) Oral daily PRN constipation  lidocaine   Patch 1 Patch Transdermal every 24 hours PRN pain  morphine  - Injectable 4 milliGRAM(s) IV Push every 4 hours PRN Severe Pain (7 - 10)  ondansetron Injectable 4 milliGRAM(s) IV Push every 6 hours PRN Nausea and/or Vomiting      Vital Signs Last 24 Hrs  T(C): 36.7 (10 Mar 2020 06:09), Max: 37.2 (09 Mar 2020 20:07)  T(F): 98.1 (10 Mar 2020 06:09), Max: 99 (09 Mar 2020 20:07)  HR: 92 (10 Mar 2020 11:21) (83 - 92)  BP: 110/60 (10 Mar 2020 11:21) (102/63 - 115/67)  BP(mean): --  RR: 16 (10 Mar 2020 11:21) (16 - 17)  SpO2: 94% (10 Mar 2020 11:21) (92% - 95%)  CAPILLARY BLOOD GLUCOSE        I&O's Summary    09 Mar 2020 07:01  -  10 Mar 2020 07:00  --------------------------------------------------------  IN: 1020 mL / OUT: 1525 mL / NET: -505 mL        PHYSICAL EXAM:  GENERAL: NAD, thin male  HEAD:  Atraumatic, Normocephalic  EYES: EOMI, PERRLA, conjunctiva and sclera clear  NECK: Supple, No JVD  CHEST/LUNG: Clear to auscultation bilaterally; No wheeze  HEART: Regular rate and rhythm; No murmurs, rubs, or gallops  ABDOMEN: Soft, Nontender, Nondistended; Bowel sounds present  EXTREMITIES:  2+ Peripheral Pulses, No clubbing, cyanosis, or edema  MSK: LLE weakness  NEUROLOGY: AAOx3, non-focal  PSYCH: calm  SKIN: No rashes or lesions. Multiple tattoos. LLE Surgical site with staples intact, C/D/I     LABS:                    RADIOLOGY & ADDITIONAL TESTS:  Results Reviewed:   Imaging Personally Reviewed:  Electrocardiogram Personally Reviewed:    COORDINATION OF CARE:  Care Discussed with Consultants/Other Providers [Y/N]:  Prior or Outpatient Records Reviewed [Y/N]:

## 2020-03-10 NOTE — PROGRESS NOTE ADULT - ASSESSMENT
68 yo male POD # 10 s/p left hip DHS  no events noted over night  pt denies leg pain, n/v/f/c    pt doing well from surgery standpoint    on Eliquis for DVT    plan  - WBAT  - Eliquis for DVT, 3-6 month course as per medicine  - awaiting [facility]  - op f/u with Dr. Coleman to remove renate

## 2020-03-10 NOTE — DISCHARGE NOTE NURSING/CASE MANAGEMENT/SOCIAL WORK - PATIENT PORTAL LINK FT
You can access the FollowMyHealth Patient Portal offered by [facility] by registering at the following website: [email or web address]. By joining [facility]’s FollowMyHealth portal, you will also be able to view your health information using other applications (apps) compatible with our system.

## 2020-03-10 NOTE — PROGRESS NOTE ADULT - SUBJECTIVE AND OBJECTIVE BOX
Follow-up Pulmonary Progress Note  Chief Complaint : Fracture of unspecified part of neck of left femur, initial encounter for closed fracture    feels well no complaints  awaiting discharge      Allergies :Flonase (Headache)  No Known Allergies      PAST MEDICAL & SURGICAL HISTORY:  Osteoporosis  BPH (benign prostatic hyperplasia)  Back pain  Osteoarthritis  History of back surgery      Medications:  MEDICATIONS  (STANDING):  apixaban 10 milliGRAM(s) Oral every 12 hours  ascorbic acid 500 milliGRAM(s) Oral daily  finasteride 5 milliGRAM(s) Oral daily  latanoprost 0.005% Ophthalmic Solution 1 Drop(s) Both EYES at bedtime  melatonin 3 milliGRAM(s) Oral at bedtime  mirabegron ER 50 milliGRAM(s) Oral daily  morphine ER Tablet 100 milliGRAM(s) Oral at bedtime  morphine ER Tablet 200 milliGRAM(s) Oral daily  phenytoin   Capsule 400 milliGRAM(s) Oral at bedtime  polyethylene glycol 3350 17 Gram(s) Oral daily  senna 2 Tablet(s) Oral at bedtime  tamsulosin 0.8 milliGRAM(s) Oral at bedtime    MEDICATIONS  (PRN):  acetaminophen   Tablet .. 650 milliGRAM(s) Oral every 6 hours PRN Temp greater or equal to 38C (100.4F)  ALBUTerol    90 MICROgram(s) HFA Inhaler 2 Puff(s) Inhalation every 6 hours PRN Shortness of Breath and/or Wheezing  lactulose Syrup 10 Gram(s) Oral daily PRN constipation  lidocaine   Patch 1 Patch Transdermal every 24 hours PRN pain  morphine  - Injectable 4 milliGRAM(s) IV Push every 4 hours PRN Severe Pain (7 - 10)  ondansetron Injectable 4 milliGRAM(s) IV Push every 6 hours PRN Nausea and/or Vomiting        VITALS:  T(C): 36.7 (03-10-20 @ 06:09), Max: 37.2 (03-09-20 @ 20:07)  T(F): 98.1 (03-10-20 @ 06:09), Max: 99 (03-09-20 @ 20:07)  HR: 83 (03-10-20 @ 00:17) (81 - 86)  BP: 105/60 (03-10-20 @ 06:09) (102/63 - 115/67)  BP(mean): --  ABP: --  ABP(mean): --  RR: 17 (03-10-20 @ 06:09) (16 - 17)  SpO2: 93% (03-10-20 @ 06:09) (92% - 96%)  CVP(mm Hg): --  CVP(cm H2O): --    Ins and Outs     03-09-20 @ 07:01  -  03-10-20 @ 07:00  --------------------------------------------------------  IN: 1020 mL / OUT: 1525 mL / NET: -505 mL                I&O's Detail    09 Mar 2020 07:01  -  10 Mar 2020 07:00  --------------------------------------------------------  IN:    Oral Fluid: 1020 mL  Total IN: 1020 mL    OUT:    Voided: 1525 mL  Total OUT: 1525 mL    Total NET: -505 mL    .

## 2020-03-10 NOTE — DISCHARGE NOTE PROVIDER - NSDCMRMEDTOKEN_GEN_ALL_CORE_FT
albuterol 90 mcg/inh inhalation powder: 2 puff(s) inhaled every 6 hours, As Needed  alendronate 70 mg oral tablet: 1 tab(s) orally once a week  apixaban 5 mg oral tablet: 1 tab(s) orally every 12 hours  apixaban 5 mg oral tablet: 2 tab(s) orally every 12 hours  ascorbic acid 1000 mg oral tablet, extended release: orally once a day  carboxymethylcellulose-Na hyaluronate: to each affected eye 2 times a day   Dilantin 100 mg oral capsule: 4 cap(s) orally once a day (at bedtime)  finasteride 5 mg oral tablet: 1 tab(s) orally once a day  lactulose 10 g/15 mL oral solution: orally once a day (at bedtime), As Needed  latanoprost 0.005% ophthalmic solution: 1 drop(s) to each affected eye once a day (in the evening)  Lidoderm:   Melatonin 1 mg oral tablet: 2 tab(s) orally once a day (at bedtime)  mirabegron 50 mg oral tablet, extended release: 1 tab(s) orally once a day  morphine 100 mg/12 hours oral tablet, extended release: 100  mg orally every evening  morphine 200 mg/12 hours oral tablet, extended release: orally once a day  morphine 30 mg oral tablet: orally every 12 hours, As Needed  polyethylene glycol 3350 oral powder for reconstitution: 17 gram(s) orally once a day  senna oral tablet: 2 tab(s) orally once a day (at bedtime)  tamsulosin 0.4 mg oral capsule: 2 cap(s) orally once a day (at bedtime)  Tylenol: 650 milligram(s) orally every 4 hours, As Needed  Vitamin D2 400 intl units oral tablet: 2 tab(s) orally once a day

## 2020-03-10 NOTE — PROGRESS NOTE ADULT - REASON FOR ADMISSION
left intertrochanteric fx

## 2020-03-10 NOTE — DISCHARGE NOTE PROVIDER - CARE PROVIDER_API CALL
Alistair Coleman)  Orthopaedic Sports Medicine; Orthopaedic Surgery  [address]  [place] [address]  Phone: [phone]  Fax: [phone]  Follow Up Time: 1 week

## 2020-03-10 NOTE — PROGRESS NOTE ADULT - SUBJECTIVE AND OBJECTIVE BOX
68 yo male POD # 10 s/p left hip DHS  no events noted over night  pt developed left femoral vein DVT, currently on Eliquis 10 mg  pt denies leg pain, n/v/f/c    Vital Signs Last 24 Hrs  T(C): 36.7 (10 Mar 2020 06:09), Max: 37.2 (09 Mar 2020 20:07)  T(F): 98.1 (10 Mar 2020 06:09), Max: 99 (09 Mar 2020 20:07)  HR: 92 (10 Mar 2020 11:21) (83 - 92)  BP: 110/60 (10 Mar 2020 11:21) (102/63 - 115/67)  RR: 16 (10 Mar 2020 11:21) (16 - 17)  SpO2: 94% (10 Mar 2020 11:21) (92% - 95%)    pt seen and examined at bedside  a+ox3, nad, non toxic  nc/at  abd - soft, nd, nttp  rle - strength, sensation, rom wnl  lle - incision c/d/i, staples in place, thigh is soft, knee effusion resolved, calf nttp, foot is warm, good dorsi/plantarflexion  gu - no Todd

## 2020-03-10 NOTE — DISCHARGE NOTE PROVIDER - NSDCCPCAREPLAN_GEN_ALL_CORE_FT
PRINCIPAL DISCHARGE DIAGNOSIS  Diagnosis: Closed fracture of left hip, initial encounter  Assessment and Plan of Treatment: pod 10  WBAT  PT/OT      SECONDARY DISCHARGE DIAGNOSES  Diagnosis: Deep vein thrombosis (DVT)  Assessment and Plan of Treatment: LLE, Provoked  Komal Srinivasan

## 2020-03-17 RX ORDER — APIXABAN 2.5 MG/1
1 TABLET, FILM COATED ORAL
Qty: 0 | Refills: 0 | DISCHARGE
Start: 2020-03-17

## 2020-03-18 ENCOUNTER — APPOINTMENT (OUTPATIENT)
Dept: SURGERY | Facility: ASSISTED LIVING FACILITY | Age: 70
End: 2020-03-18
Payer: MEDICARE

## 2020-03-18 PROCEDURE — 99304 1ST NF CARE SF/LOW MDM 25: CPT

## 2020-03-19 PROBLEM — M54.9 DORSALGIA, UNSPECIFIED: Chronic | Status: ACTIVE | Noted: 2020-02-28

## 2020-03-19 PROBLEM — N40.0 BENIGN PROSTATIC HYPERPLASIA WITHOUT LOWER URINARY TRACT SYMPTOMS: Chronic | Status: ACTIVE | Noted: 2020-02-28

## 2020-03-19 PROBLEM — M81.0 AGE-RELATED OSTEOPOROSIS WITHOUT CURRENT PATHOLOGICAL FRACTURE: Chronic | Status: ACTIVE | Noted: 2020-02-28

## 2020-03-19 PROBLEM — M19.90 UNSPECIFIED OSTEOARTHRITIS, UNSPECIFIED SITE: Chronic | Status: ACTIVE | Noted: 2020-02-28

## 2020-04-16 ENCOUNTER — APPOINTMENT (OUTPATIENT)
Dept: ORTHOPEDIC SURGERY | Facility: CLINIC | Age: 70
End: 2020-04-16
Payer: SELF-PAY

## 2020-05-07 ENCOUNTER — APPOINTMENT (OUTPATIENT)
Dept: ORTHOPEDIC SURGERY | Facility: CLINIC | Age: 70
End: 2020-05-07
Payer: SELF-PAY

## 2020-05-14 ENCOUNTER — OUTPATIENT (OUTPATIENT)
Dept: OUTPATIENT SERVICES | Facility: HOSPITAL | Age: 70
LOS: 1 days | End: 2020-05-14
Payer: MEDICARE

## 2020-05-14 ENCOUNTER — APPOINTMENT (OUTPATIENT)
Dept: RADIOLOGY | Facility: HOSPITAL | Age: 70
End: 2020-05-14
Payer: MEDICARE

## 2020-05-14 ENCOUNTER — APPOINTMENT (OUTPATIENT)
Dept: ORTHOPEDIC SURGERY | Facility: CLINIC | Age: 70
End: 2020-05-14
Payer: SELF-PAY

## 2020-05-14 VITALS — BODY MASS INDEX: 19.26 KG/M2 | WEIGHT: 130 LBS | HEIGHT: 69 IN

## 2020-05-14 DIAGNOSIS — S72.142A DISPLACED INTERTROCHANTERIC FRACTURE OF LEFT FEMUR, INITIAL ENCOUNTER FOR CLOSED FRACTURE: ICD-10-CM

## 2020-05-14 DIAGNOSIS — S72.142D DISPLACED INTERTROCHANTERIC FRACTURE OF LEFT FEMUR, SUBSEQUENT ENCOUNTER FOR CLOSED FRACTURE WITH ROUTINE HEALING: ICD-10-CM

## 2020-05-14 DIAGNOSIS — Z98.890 OTHER SPECIFIED POSTPROCEDURAL STATES: Chronic | ICD-10-CM

## 2020-05-14 PROCEDURE — 99024 POSTOP FOLLOW-UP VISIT: CPT

## 2020-05-14 PROCEDURE — 73502 X-RAY EXAM HIP UNI 2-3 VIEWS: CPT

## 2020-05-14 PROCEDURE — 73502 X-RAY EXAM HIP UNI 2-3 VIEWS: CPT | Mod: 26,LT

## 2020-05-14 NOTE — HISTORY OF PRESENT ILLNESS
[de-identified] : s/p ORIF of left hip intertrochanteric femur fracture on 02/29/2020.  [de-identified] : Patient is a 69 year old male s/p ORIF of left hip intertrochanteric femur fracture on 02/29/2020. He presents today for his first post op appointment. Patient presents ambulating with a cane.  Patient is currently taking Xarelto due to having a DVT post operatively. [facility]. \par Patient states he is feeling good overall. He reports an achy pain diffusely located in the left hip. He is currently taking Tylenol for pain relief with good relief in symptoms.  He attended formal Physical therapy post operatively but now states he is performing a HEP noting increased strength and range of motion.  [de-identified] : Left Lower Extremity\par o Hip :\par ¦ Inspection/Palpation : no tenderness, no swelling, no deformity\par ¦ Range of Motion : full and painless in all planes, no crepitus\par ¦ Stability : joint stability intact\par ¦ Strength : Hip flexion 4/5, Glute Med 3+/5 with pain \par ¦ Tests and Signs : all tests for stability normal\par o Muscle Tone : tone normal\par o Muscle Bulk : normal muscle bulk present\par o Skin : no erythema, no ecchymosis\par o Sensation : sensation to light touch intact\par o Vascular Exam : no edema, no cyanosis, dorsalis pedis artery pulse 2+, posterior tibial artery pulse 2+ [de-identified] :  Left Hip Xrays taken at [facility] on 05/14/2020 revealed: DHS in place, excellent position, fracture healing well.   [de-identified] : The underlying pathophysiology and surgical procedure  was reviewed in great detail with the patient as well as the various treatment options, including ice, analgesics, NSAIDs, Physical therapy,\par \par A prescription for Physical Therapy was provided. A home exercise sheet was given and discussed with the patient to follow.\par \par FU 3 months

## 2020-08-05 NOTE — ED PROVIDER NOTE - CARE PLAN
[address] [facility] - History & Physical      PCP: INGRID Barrientos CNP    Date of Admission: 8/4/2020    Date of Service: 8/5/2020    Chief Complaint: fever, SOB, headache, fatigue body aches, worried about COVID-19     History Of Present Illness: The patient is a 73 y.o. female with PMH of morbid obesity HUNG, COPD wears 2 LNC at night, DM, HTN, paroxysmal afib, CKD, parkinson  who presents to the emergency department withfever, SOB, headache, fatigue body aches, worried about COVID-19 For the past few days has had headache and some achy discomfort in shoulders and neck. No injuries. Also had nonproductive cough and some dyspnea. Low-grade fever. Has had chest discomfort she says worse with exertion. No unilateral leg swelling or pain but has some bilateral swelling of her legs which is chronic and baseline. Denies history of CHF. Some nausea but no abdominal pain vomiting or diarrhea. Son-in-law recently diagnosed with [employer]  but she denies any direct exposure to son-in-law. Patient is nonambulatory due to history of Parkinson. No focal numbness or weakness. In ER COVID-19 +ve, Dimer elevated, BNP -ve, troponin -ve, CXR LLL pneumonia  UA -ve, Flue -ve was admitted , give albuterol, rocephin, zithromax   Past Medical History:        Diagnosis Date    Asthma 4/21/2015    Bilateral carpal tunnel syndrome 04/09/2018    sees dr. Velasquez Friedman.     Colon polyp     Diabetes mellitus ([address]     GERD (gastroesophageal reflux disease)     HTN (hypertension)     Hyperlipidemia     Mild mitral regurgitation by prior echocardiogram 2/11/2016    Morbid obesity ([address] 10/18/2017    Normal cardiac stress test 4-2-09    no ischemia at attained level of excercise    Parkinson disease (HCC)     Paroxysmal atrial fibrillation ([address]     sees dr. Naa Marino Post-menopausal     Prolonged emergence from general anesthesia     Restrictive airway disease     Stage 3 chronic kidney disease ([facility] 10/18/2017       Past Surgical History:        Procedure Laterality Date    APPENDECTOMY       SECTION      x3    COLONOSCOPY      Dr Hernandez Bodily polyp-5 yr recall    COLONOSCOPY N/A 2019    Dr Shine Minor 2 internal hemorrhoids without stigmata, diverticulosis, suboptimal prep--5 yr recall    GALLBLADDER SURGERY  2014    dr Bubba Miller N/CARPAL TUNNEL SURG Right 2018    OPEN CARPAL TUNNEL RELEASE performed by Woodrow Becker MD at [facility] TYMPANOSTOMY TUBE PLACEMENT      dr Judith Lucas in [address] [place] [address] Medications:  Prior to Admission medications    Medication Sig Start Date End Date Taking? Authorizing Provider   allopurinol (ZYLOPRIM) 100 MG tablet Take 1 tablet by mouth daily 20  Yes Historical Provider, MD   albuterol (ACCUNEB) 1.25 MG/3ML nebulizer solution Inhale 3 mLs into the lungs every 6 hours as needed for Wheezing 17  Yes INGRID Edmondson CNP   albuterol (PROVENTIL HFA;VENTOLIN HFA) 108 (90 BASE) MCG/ACT inhaler Inhale 2 puffs into the lungs every 6 hours as needed for Wheezing 7/17/15  Yes INGRID Edmondson CNP   aspirin 325 MG tablet Take 325 mg by mouth daily.      Yes Historical Provider, MD   insulin aspart (NOVOLOG) 100 UNIT/ML injection vial Inject 22 Units into the skin 3 times daily (before meals)    Historical Provider, MD   erythromycin [facility]) 5 MG/GM ophthalmic ointment Place into both eyes nightly as needed 20   Historical Provider, MD   insulin detemir (LEVEMIR FLEXTOUCH) 100 UNIT/ML injection pen Inject 70 units subcu nightly for type 2 diabetes 20   Arnaldo Griffin MD   gabapentin (NEURONTIN) 600 MG tablet TAKE ONE TABLET BY MOUTH 3 TIMES DAILY AS NEEDED 20  Isabel Rivera MD   EASY COMFORT INSULIN SYRINGE 31G X \" 1 ML MISC INJECT AS DIRECTED DAILY 20   INGRID Harkins - CESAR omeprazole (PRILOSEC) 40 MG delayed release capsule TAKE ONE CAPSULE BY MOUTH EVERY DAY 7/21/20   INGRID Barrientos CNP   atorvastatin (LIPITOR) 10 MG tablet TAKE ONE TABLET BY MOUTH EACH EVENING 6/19/20   INGRID Barrientos CNP   carbidopa-levodopa (SINEMET)  MG per tablet TAKE TWO TABLETS BY MOUTH 3 TIMES DAILY 6/2/20   Rl Driscoll MD   rOPINIRole (REQUIP) 4 MG tablet TAKE TWO TABLETS EVERY MORNING TAKE ONE TABLET AT NOON AND TAKE TWO TABLETS EVERY NIGHT AT BEDTIME 5/18/20   Rl Driscoll MD   montelukast (SINGULAIR) 10 MG tablet TAKE ONE TABLET BY MOUTH EVERY NIGHT AT BEDTIME 5/7/20   INGRID Barrientos CNP   LINZESS 290 MCG CAPS capsule TAKE ONE CAPSULE IN THE MORNING BEFORE BREAKFAST 4/13/20   INGRID Barrientos CNP   Liraglutide (VICTOZA) 18 MG/3ML SOPN SC injection Inject 1.8 mg into the skin daily 3/10/20   INGRID Barrientos CNP   nystatin (NYSTATIN) 761357 UNIT/GM powder Apply topically 3 times daily Apply topically 4 times daily. 3/4/20   INGRID Barrientos CNP   torsemide (DEMADEX) 20 MG tablet Take 20 mg by mouth daily 3 tablets for three days then two tablets daily     Historical Provider, MD   blood glucose monitor kit and supplies Use as directed for diabetes TID checks. 12/20/19   INGRID Barrientos CNP   blood glucose monitor strips Test 3times a day & as needed for symptoms of irregular blood glucose.  12/20/19   INGRID Barrientos CNP   sotalol (BETAPACE) 120 MG tablet Take 1 tablet by mouth 2 times daily 11/5/19   INGRID Whitaker   fluconazole (DIFLUCAN) 150 MG tablet Take one on the 15th of every month 10/14/19   INGRID Barrientos CNP   TRUEPLUS INSULIN SYRINGE 30G X 5/16\" 1 ML MISC INJECT AS DIRECTED DAILY 8/13/19   INGRID Barrientos CNP   clobetasol (TEMOVATE) 0.05 % ointment Apply external vagina 3 x a day for week one, then decrease to BID on week two, on week 3 once a day, on week four every other day then stop 7/10/19   Ghazala Jauregui MD   Melatonin 10 MG CAPS Take 10 mg by mouth every evening Takes 20 mg a night    Historical Provider, MD   denosumab (PROLIA) 60 MG/ML SOSY SC injection Inject 60 mg into the skin every 6 months    Historical Provider, MD   losartan (COZAAR) 100 MG tablet TAKE ONE TABLET DAILY 4/16/19   INGRID Rush   spironolactone (ALDACTONE) 25 MG tablet TAKE ONE TABLET DAILY  Patient taking differently: One tablet in the am and one tablet in the evening 10/23/18   INGRID Neville   nystatin (MYCOSTATIN) 349415 UNIT/GM ointment Apply topically 2 times daily. for yeast 6/14/18   INGRID Turner CNP   glucose blood VI test strips (ONE TOUCH ULTRA TEST) strip Inject 1 each into the skin daily As needed. 6/4/18   INGRID Turner CNP   Lancets MISC 1 lancet to check glucose daily 1/21/16   INGRID Turner CNP   Insulin Pen Needle 31G X 8 MM MISC Inject 1 each into the skin daily 1/11/16   INGRID Turner CNP   OXYGEN 2L NC 12-24 hours  Patient taking differently: nightly as needed 2L NC 12-24 hours 6/29/15   INGRID Turner CNP       Allergies:    Codeine and Hydrocodone-acetaminophen    Social History:      Tobacco:   reports that she has never smoked. She has never used smokeless tobacco.  Alcohol:   reports no history of alcohol use.   Illicit Drugs: no     Family History:      Problem Relation Age of Onset    High Blood Pressure Father     Diabetes Father     Parkinsonism Father     High Blood Pressure Mother     Diabetes Sister     Other Paternal Grandmother         hiatal hernia    Heart Disease Paternal Grandfather     Colon Cancer Neg Hx     Colon Polyps Neg Hx     Esophageal Cancer Neg Hx     Liver Cancer Neg Hx     Liver Disease Neg Hx     Rectal Cancer Neg Hx     Stomach Cancer Neg Hx        Review of Systems:  Constitutional / general:  No fever / chills / sweats  HEENT: No sore throat / hoarseness / vision changes  GI: No nausea / vomiting / abdominal pain / diarrhea / constipation  :  No dysuria / hesitancy / urgency / hematuria   Musculoskeletal:  No edema / cyanosis / pain  Psychiatric:  No depression / anxiety / insomnia  Skin:  No new rashes / lesions    Physical Examination:       BP (!) 132/57   Pulse 86   Temp 98.6 °F (37 °C) (Oral)   Resp 23   Ht 5' 2\" (1.575 m)   Wt 293 lb (132.9 kg)   SpO2 97%   BMI 53.59 kg/m²    To minimize the exposure and spread of COVID-19 physical exam was coordinated with nursing staff   General appearance: some apparent distress, appears stated age and cooperative. Well developed and well groomed. HEENT: Normal cephalic, atraumatic without obvious deformity. Pupils equal, round, and reactive to light. Extra ocular muscles intact. Conjunctivae/corneas clear. Normal ears and nose. Neck: Supple, with full range of motion. No jugular venous distention. Trachea midline. Thyroid no masses noted. Respiratory:  Bilateral Rales/Wheezes/Rhonchi. Cardiovascular: Regular rate and rhythm with normal S1/S2 without murmurs, rubs or gallops. .  Abdomen: Soft, non-tender, non-distended with normal bowel sounds. Musculoskeletal: No clubbing, cyanosis or edema bilaterally. Full range of motion without deformity in 4 extremities. .  Neurologic:  Neurovascularly intact without any focal sensory/motor deficits. Cranial nerves: II-XII intact, grossly non-focal.  Psychiatric: Alert and oriented, thought content appropriate, normal insight.      Diagnostic Data:  Imaging:   XR CHEST PORTABLE    (Results Pending)     CBC:  Recent Labs     08/04/20  2340   WBC 4.8   HGB 11.6*   HCT 36.6*        BMP:  Recent Labs     08/05/20  0133      K 4.9   CL 99   CO2 24   BUN 43*   CREATININE 1.9*   CALCIUM 9.0     Recent Labs     08/05/20  0133   AST 21   ALT <5*   BILITOT <0.2   ALKPHOS 65     Coag Panel:   Recent Labs     08/04/20  2340   INR 0.98   PROTIME 12.9     Cardiac Enzymes:   Recent Labs 08/05/20  0133   TROPONINI <0.01     ABGs:  Lab Results   Component Value Date    PHART 7.440 01/03/2018    PO2ART 61.0 01/03/2018    RLT8CGW 38.0 01/03/2018     Urinalysis:  Lab Results   Component Value Date    NITRU Negative 08/05/2020    WBCUA 49 01/20/2014    BACTERIA NEGATIVE 01/20/2014    RBCUA 1 01/20/2014    BLOODU Negative 08/05/2020    SPECGRAV 1.020 08/05/2020    GLUCOSEU 100 08/05/2020       Active Hospital Problems    Diagnosis Date Noted    COVID-19 virus infection [U07.1] 08/05/2020       Assessment and Plan: Active Problems:    COVID-19 virus infection  Resolved Problems:    * No resolved hospital problems.  *  admit, rocephin, Zithromax, dexamethasone, albuterol consult ID  chest pain , SOB, elevated dimer, CR 1.9, check V/Q perfusion part   SSI  DVT prophylaxis with Lovenox           [facility]  Hospitalist service  8/5/2020  3:45 AM Principal Discharge DX:	Closed fracture of left hip, initial encounter  Secondary Diagnosis:	Fall

## 2021-01-04 ENCOUNTER — APPOINTMENT (OUTPATIENT)
Dept: SPINE | Facility: CLINIC | Age: 71
End: 2021-01-04
Payer: MEDICARE

## 2021-01-04 VITALS
SYSTOLIC BLOOD PRESSURE: 122 MMHG | OXYGEN SATURATION: 94 % | RESPIRATION RATE: 18 BRPM | TEMPERATURE: 97.9 F | HEIGHT: 69 IN | HEART RATE: 89 BPM | WEIGHT: 130 LBS | DIASTOLIC BLOOD PRESSURE: 73 MMHG | BODY MASS INDEX: 19.26 KG/M2

## 2021-01-04 PROCEDURE — 99204 OFFICE O/P NEW MOD 45 MIN: CPT

## 2021-01-04 PROCEDURE — 99072 ADDL SUPL MATRL&STAF TM PHE: CPT

## 2021-01-04 RX ORDER — ALENDRONATE SODIUM 70 MG/1
70 TABLET ORAL
Refills: 0 | Status: ACTIVE | COMMUNITY

## 2021-01-04 RX ORDER — PHENYTOIN SODIUM EXTENDED 100 MG
CAPSULE ORAL
Refills: 0 | Status: DISCONTINUED | COMMUNITY
End: 2021-01-04

## 2021-01-04 RX ORDER — LAMOTRIGINE 100 MG/1
100 TABLET ORAL
Refills: 0 | Status: ACTIVE | COMMUNITY

## 2021-01-04 RX ORDER — MULTIVIT-MIN/FOLIC/VIT K/LYCOP 400-300MCG
500 TABLET ORAL
Refills: 0 | Status: ACTIVE | COMMUNITY

## 2021-01-04 RX ORDER — GLUCOSAMINE HCL/CHONDROITIN SU 500-400 MG
3 CAPSULE ORAL
Refills: 0 | Status: ACTIVE | COMMUNITY

## 2021-01-04 RX ORDER — OXYBUTYNIN CHLORIDE 15 MG/1
15 TABLET, EXTENDED RELEASE ORAL
Qty: 90 | Refills: 2 | Status: DISCONTINUED | COMMUNITY
Start: 2018-05-04 | End: 2021-01-04

## 2021-01-04 RX ORDER — LIDOCAINE 5% 700 MG/1
5 PATCH TOPICAL
Refills: 0 | Status: ACTIVE | COMMUNITY

## 2021-01-04 RX ORDER — SIMETHICONE CHEW TAB 80 MG 80 MG
80 TABLET ORAL
Refills: 0 | Status: ACTIVE | COMMUNITY

## 2021-01-04 RX ORDER — FLUTICASONE PROPIONATE 50 MCG
50 SPRAY, SUSPENSION NASAL
Refills: 0 | Status: DISCONTINUED | COMMUNITY
End: 2021-01-04

## 2021-01-04 NOTE — CONSULT LETTER
[Dear  ___] : Dear  [unfilled], [Consult Letter:] : I had the pleasure of evaluating your patient, [unfilled]. [Please see my note below.] : Please see my note below. [Consult Closing:] : Thank you very much for allowing me to participate in the care of this patient.  If you have any questions, please do not hesitate to contact me. [Sincerely,] : Sincerely, [FreeTextEntry3] : Sj Britton MD\par Chief of Neurosurgery [facility]\par [facility]\par

## 2021-01-04 NOTE — ASSESSMENT
[FreeTextEntry1] : Cervical Stenosis of spine.   Surgery of the cervical spine was discussed to alleviate the compression.   Presently, Mr. Helton has full power of his UE but surgery was recommended to avoid future myelopathy and  to avoid permanent disability.  He is describing symptoms intermittently consistent with Lhermitte's sign.  A CT scan will be obtained with a flexion extension xray of the  cervical spine and he will return to determine an anterior or posterior approach would be most beneficial.  Once the images are complete he will return for review.    \par \par \par CC\par Jodie Mckinnon MD\par [facility]\par

## 2021-01-04 NOTE — REASON FOR VISIT
[New Patient Visit] : a new patient visit [Referred By: _________] : Patient was referred by CHAITANYA [Other: _____] : [unfilled] [FreeTextEntry1] : Cervical Stenosis

## 2021-01-04 NOTE — HISTORY OF PRESENT ILLNESS
[Unknown] : unknown [FreeTextEntry1] : Cervical Stenosis [de-identified] : Mr Helton a 70-year-old and is here for MRI findings consistent with cervical stenosis. [facility] [place] and had been followed for sinus disease.  On scan an incidental finding of cervical stenosis was noted and an MRI was performed.  He denies any routine c/o pain in his neck or arm radicular symptoms.  On occasions he has right hand electric shocks in his neck and shoulder pain on the right side, but nothing that is severe or impacts his daily activity.  He denies any weakness and no coordination issues of his UE.  He does have a long standing history of  lumbar back pain and s/p surgery,.  He has been utilizing a cane since 1996 when his lumbar surgery was complete.  MRI of the cervical spine shows severe cervical stenosis with cord compression . The cervical MRI scan shows significant spinal stenosis with cord impingement at C3-4 and C4-5. There are degenerative changes to a lesser degree at other levels. There is significant loss of lordosis.

## 2021-01-04 NOTE — PHYSICAL EXAM
[Person] : oriented to person [Place] : oriented to place [Time] : oriented to time [Short Term Intact] : short term memory intact [Remote Intact] : remote memory intact [Span Intact] : the attention span was normal [Concentration Intact] : normal concentrating ability [Fluency] : fluency intact [Comprehension] : comprehension intact [Current Events] : adequate knowledge of current events [Past History] : adequate knowledge of personal past history [Vocabulary] : adequate range of vocabulary [Cranial Nerves Optic (II)] : visual acuity intact bilaterally,  pupils equal round and reactive to light [Cranial Nerves Oculomotor (III)] : extraocular motion intact [Cranial Nerves Trigeminal (V)] : facial sensation intact symmetrically [Cranial Nerves Facial (VII)] : face symmetrical [Cranial Nerves Vestibulocochlear (VIII)] : hearing was intact bilaterally [Cranial Nerves Glossopharyngeal (IX)] : tongue and palate midline [Cranial Nerves Accessory (XI - Cranial And Spinal)] : head turning and shoulder shrug symmetric [Cranial Nerves Hypoglossal (XII)] : there was no tongue deviation with protrusion [Motor Tone] : muscle tone was normal in all four extremities [Motor Strength] : muscle strength was normal in all four extremities [No Muscle Atrophy] : normal bulk in all four extremities [Sensation Tactile Decrease] : light touch was intact [Abnormal Walk] : normal gait [Balance] : balance was intact [2+] : Ankle jerk left 2+ [Past-pointing] : there was no past-pointing [Tremor] : no tremor present [Plantar Reflex Right Only] : normal on the right [Plantar Reflex Left Only] : normal on the left [Black] : Black's sign was not demonstrated [L'Hermitte's] : neck flexion did not produce tingling down the spine/arms [Spurling's - Opposite Side] : Negative Spurling's on opposite side [Spurling's Same Side] : Negative Spurling's on same side

## 2021-01-11 LAB
HEMOCCULT STL QL IA: NEGATIVE

## 2021-03-01 ENCOUNTER — APPOINTMENT (OUTPATIENT)
Dept: SPINE | Facility: CLINIC | Age: 71
End: 2021-03-01
Payer: MEDICARE

## 2021-03-01 VITALS
BODY MASS INDEX: 19.85 KG/M2 | HEART RATE: 81 BPM | WEIGHT: 134 LBS | SYSTOLIC BLOOD PRESSURE: 109 MMHG | TEMPERATURE: 98.2 F | HEIGHT: 69 IN | OXYGEN SATURATION: 86 % | DIASTOLIC BLOOD PRESSURE: 69 MMHG

## 2021-03-01 PROCEDURE — 99213 OFFICE O/P EST LOW 20 MIN: CPT

## 2021-03-01 PROCEDURE — 99072 ADDL SUPL MATRL&STAF TM PHE: CPT

## 2021-03-01 NOTE — PHYSICAL EXAM
[Motor Strength] : muscle strength was normal in all four extremities [Sensation Tactile Decrease] : light touch was intact [Abnormal Walk] : normal gait [2+] : Ankle jerk left 2+ [Black] : Black's sign was not demonstrated

## 2021-03-01 NOTE — DATA REVIEWED
[de-identified] : CT of cervical spine from [facility] 1/29/2021  [de-identified] : Cervical xrays from [facility] 1/29/2021

## 2021-03-01 NOTE — REASON FOR VISIT
[Follow-Up: _____] : a [unfilled] follow-up visit [Other: _____] : [unfilled] [FreeTextEntry1] : 70 year old male with a history of sinus disease and  incidentally found cervical stenosis.   On MRI there is C 3 C 4 , C 4 C 5 with significant stenosis and advanced DJD.  He has pain rated at a 10/10 of his back.  He has occasional pain and denies any UE weakness.  No balance disturbance.  He has clarified his previous descriptions that was thought to be consistent with L'ehrmittes syndrome. The symptoms do not go down his arm to his hands.Flexion and extension x-rays do not show any dynamic instability. A CT scan shows advanced degenerative disc disease at nearly every level in the cervical spine.

## 2021-06-07 ENCOUNTER — APPOINTMENT (OUTPATIENT)
Dept: SPINE | Facility: CLINIC | Age: 71
End: 2021-06-07
Payer: MEDICARE

## 2021-06-07 VITALS
DIASTOLIC BLOOD PRESSURE: 66 MMHG | HEIGHT: 69 IN | OXYGEN SATURATION: 92 % | HEART RATE: 93 BPM | WEIGHT: 132 LBS | BODY MASS INDEX: 19.55 KG/M2 | SYSTOLIC BLOOD PRESSURE: 111 MMHG

## 2021-06-07 PROCEDURE — 99212 OFFICE O/P EST SF 10 MIN: CPT

## 2021-06-07 NOTE — PHYSICAL EXAM
[Motor Strength] : muscle strength was normal in all four extremities [Sensation Tactile Decrease] : light touch was intact [Abnormal Walk] : normal gait [2+] : Ankle jerk left 2+ [Black] : Black's sign was not demonstrated [L'Hermitte's] : neck flexion did not produce tingling down the spine/arms [Spurling's - Opposite Side] : Negative Spurling's on opposite side [Spurling's Same Side] : Negative Spurling's on same side

## 2021-06-07 NOTE — REASON FOR VISIT
[Follow-Up: _____] : a [unfilled] follow-up visit [Other: _____] : [unfilled] [FreeTextEntry1] : \par Mr. Helton is seen today in neurosurgical consult for cervical stenosis and no myelopathy.  He suffers from chronic pain and is under the care of pain management.  He continue to have no upper motor tract signs even though he has severe cervical stenosis.  He has no pain in  his arms.  Denies any coordination issues.

## 2021-06-07 NOTE — ASSESSMENT
[FreeTextEntry1] : \par \par 70 year old male with severe cervical stenosis and breezy neurological exam and no myelopathy.  He will return in four months for re-evaluation and if he has any hand weakness or numbness or changes in his gait he will contact the office sooner than October 2021.  No erika.

## 2021-10-18 ENCOUNTER — APPOINTMENT (OUTPATIENT)
Dept: SPINE | Facility: CLINIC | Age: 71
End: 2021-10-18

## 2021-10-25 ENCOUNTER — APPOINTMENT (OUTPATIENT)
Dept: SPINE | Facility: CLINIC | Age: 71
End: 2021-10-25
Payer: MEDICARE

## 2021-10-25 VITALS
WEIGHT: 134 LBS | SYSTOLIC BLOOD PRESSURE: 119 MMHG | BODY MASS INDEX: 19.85 KG/M2 | OXYGEN SATURATION: 92 % | HEIGHT: 69 IN | HEART RATE: 83 BPM | DIASTOLIC BLOOD PRESSURE: 63 MMHG

## 2021-10-25 DIAGNOSIS — Z87.891 PERSONAL HISTORY OF NICOTINE DEPENDENCE: ICD-10-CM

## 2021-10-25 PROCEDURE — 99214 OFFICE O/P EST MOD 30 MIN: CPT

## 2021-10-25 NOTE — END OF VISIT
[FreeTextEntry3] : I, Dr. Sj Britton, evaluated this patient with the Nurse Practitioner, Kim Lombardo, and established the plan of care.  I personally discussed this patient  during the key portions of the history and exam with the Nurse Practitioner at the time of the visit.  I agree with the assessment and plan as written, unless noted below.\par

## 2021-10-25 NOTE — ASSESSMENT
[FreeTextEntry1] : \par \par Severe cervical stenosis and  now with weakness noted in LE.   Cervical surgery was discussed to avoid further neurologic impairment.  A posterior fusion was discussed and risks related of surgery were discussed in detail - including but not limited to CSF leak, infection, nerve damage, temporary or permanent weakness or numbness, hemorrhage, cardiac events, and rarely even death, among others. Repair of CSF leak explained.  He will consider surgery or return in three months for follow up.  He understands that avoiding surgery,  may  leave a permanent neurologic deficit.  A CT and MRI scan will be ordered when he returns if his symptoms are changed.

## 2021-10-25 NOTE — PHYSICAL EXAM
[2+] : Ankle jerk left 2+ [Black] : Black's sign was not demonstrated [FreeTextEntry6] : mild proximal lower extremity weakness 4/5...  strength is normal

## 2021-10-25 NOTE — REASON FOR VISIT
[Follow-Up: _____] : a [unfilled] follow-up visit [Other: _____] : [unfilled] [FreeTextEntry1] : Mr Helton i nicholase today for a follow up visit.  He has cervical stenosis and no upper motor tract signs.   He is being followed for an new symptoms or  upper motor tract signs.  The MRI from one year ago shows stenosis  and cord compression. He reports no walking gait dysfunction or weakness of his UE and LE.  No uirne or bowel incontinence.

## 2022-01-31 ENCOUNTER — APPOINTMENT (OUTPATIENT)
Dept: SPINE | Facility: CLINIC | Age: 72
End: 2022-01-31
Payer: MEDICARE

## 2022-01-31 VITALS
BODY MASS INDEX: 19.7 KG/M2 | SYSTOLIC BLOOD PRESSURE: 110 MMHG | WEIGHT: 133 LBS | DIASTOLIC BLOOD PRESSURE: 67 MMHG | HEART RATE: 77 BPM | HEIGHT: 69 IN | OXYGEN SATURATION: 94 %

## 2022-01-31 DIAGNOSIS — Z72.89 OTHER PROBLEMS RELATED TO LIFESTYLE: ICD-10-CM

## 2022-01-31 DIAGNOSIS — Z87.891 PERSONAL HISTORY OF NICOTINE DEPENDENCE: ICD-10-CM

## 2022-01-31 DIAGNOSIS — M48.02 SPINAL STENOSIS, CERVICAL REGION: ICD-10-CM

## 2022-01-31 PROCEDURE — 99212 OFFICE O/P EST SF 10 MIN: CPT

## 2022-01-31 NOTE — ASSESSMENT
[FreeTextEntry1] : \par \par Cervical stenosis.  No myelopathy.  He will return  if his myelopathy symptoms occur.

## 2022-01-31 NOTE — REASON FOR VISIT
[Follow-Up: _____] : a [unfilled] follow-up visit [Other: _____] : [unfilled] [FreeTextEntry1] : \par Mr Helton is here in follow up to monitor him for increased myelopathy due to cervical stenosis.  He reports today with no pain and no reports of weakness of his UE.    In addition he has no gait abnormality.

## 2022-04-19 NOTE — ASSESSMENT
Yes [FreeTextEntry1] : Significant cord compression of the cervical spine and advanced DJD.  CT and cervical xrays confirm findings on MRI and  no instability.   Surgery was discussed to avoid permanent neurologic deficit.  He understood that he could have neurologic impairment of he delays surgical intervention.   Presently he is totally asymptomatic with normal neurological exam. He will follow up in three months to have an exam and evaluate for myelopathy or progressive symptoms.  He will bring the MRI of the cervical spine.

## 2023-10-16 NOTE — DISCHARGE NOTE NURSING/CASE MANAGEMENT/SOCIAL WORK - NSDCPEELIQUISREACT_GEN_ALL_CORE
WM 3 pt is the same weight as previous month.Desires to continue weight management.     Apixaban/Eliquis increases your risk for bleeding. Notify your doctor if you experience any of the following side effects: bleeding, coughing or vomiting blood, red or black stool, unexpected pain or swelling, itching or hives, chest pain, chest tightness, trouble breathing, changes in how much or how often you urinate, red or pink urine, numbness or tingling in your feet, or unusual muscle weakness. When Apixaban/Eliquis is taken with other medicines, they can affect how it works. Taking other medications such as aspirin, blood thinners, nonsteroidal anti-inflammatories, and medications that treat depression can increase your risk of bleeding. It is very important to tell your health care provider about all of the other medicines, including over-the-counter medications, herbs, and vitamins you are taking. DO NOT start, stop, or change the dosage of any medicine, including over-the-counter medicines, vitamins, and herbal products without your doctor’s approval. Any products containing aspirin or are nonsteroidal anti-inflammatories lessen the blood’s ability to form clots and add to the effect of Apixaban/Eliquis. Never take aspirin or medicines that contain aspirin without speaking to your doctor.

## 2024-04-09 DIAGNOSIS — Z12.11 ENCOUNTER FOR SCREENING FOR MALIGNANT NEOPLASM OF COLON: ICD-10-CM

## 2024-07-07 ENCOUNTER — INPATIENT (INPATIENT)
Facility: HOSPITAL | Age: 74
LOS: 2 days | Discharge: NURSING, REHAB OR LONG-TERM CARE | DRG: 947 | End: 2024-07-10
Attending: STUDENT IN AN ORGANIZED HEALTH CARE EDUCATION/TRAINING PROGRAM | Admitting: STUDENT IN AN ORGANIZED HEALTH CARE EDUCATION/TRAINING PROGRAM
Payer: MEDICARE

## 2024-07-07 VITALS
RESPIRATION RATE: 17 BRPM | TEMPERATURE: 99 F | OXYGEN SATURATION: 96 % | HEART RATE: 105 BPM | SYSTOLIC BLOOD PRESSURE: 111 MMHG | WEIGHT: 125 LBS | HEIGHT: 69 IN | DIASTOLIC BLOOD PRESSURE: 72 MMHG

## 2024-07-07 DIAGNOSIS — Z98.890 OTHER SPECIFIED POSTPROCEDURAL STATES: Chronic | ICD-10-CM

## 2024-07-07 DIAGNOSIS — Z29.9 ENCOUNTER FOR PROPHYLACTIC MEASURES, UNSPECIFIED: ICD-10-CM

## 2024-07-07 DIAGNOSIS — R53.1 WEAKNESS: ICD-10-CM

## 2024-07-07 DIAGNOSIS — G40.909 EPILEPSY, UNSPECIFIED, NOT INTRACTABLE, WITHOUT STATUS EPILEPTICUS: ICD-10-CM

## 2024-07-07 DIAGNOSIS — J18.9 PNEUMONIA, UNSPECIFIED ORGANISM: ICD-10-CM

## 2024-07-07 DIAGNOSIS — M81.0 AGE-RELATED OSTEOPOROSIS WITHOUT CURRENT PATHOLOGICAL FRACTURE: ICD-10-CM

## 2024-07-07 DIAGNOSIS — M62.81 MUSCLE WEAKNESS (GENERALIZED): ICD-10-CM

## 2024-07-07 LAB
ADD ON TEST-SPECIMEN IN LAB: SIGNIFICANT CHANGE UP
ANION GAP SERPL CALC-SCNC: 13 MMOL/L — SIGNIFICANT CHANGE UP (ref 5–17)
BASOPHILS # BLD AUTO: 0.04 K/UL — SIGNIFICANT CHANGE UP (ref 0–0.2)
BASOPHILS NFR BLD AUTO: 0.4 % — SIGNIFICANT CHANGE UP (ref 0–2)
BUN SERPL-MCNC: 9 MG/DL — SIGNIFICANT CHANGE UP (ref 7–23)
CALCIUM SERPL-MCNC: 9.4 MG/DL — SIGNIFICANT CHANGE UP (ref 8.4–10.5)
CHLORIDE SERPL-SCNC: 105 MMOL/L — SIGNIFICANT CHANGE UP (ref 96–108)
CO2 SERPL-SCNC: 24 MMOL/L — SIGNIFICANT CHANGE UP (ref 22–31)
CREAT SERPL-MCNC: 0.73 MG/DL — SIGNIFICANT CHANGE UP (ref 0.5–1.3)
EGFR: 97 ML/MIN/1.73M2 — SIGNIFICANT CHANGE UP
EOSINOPHIL # BLD AUTO: 0.16 K/UL — SIGNIFICANT CHANGE UP (ref 0–0.5)
EOSINOPHIL NFR BLD AUTO: 1.7 % — SIGNIFICANT CHANGE UP (ref 0–6)
GLUCOSE SERPL-MCNC: 116 MG/DL — HIGH (ref 70–99)
HCT VFR BLD CALC: 40.3 % — SIGNIFICANT CHANGE UP (ref 39–50)
HGB BLD-MCNC: 13.4 G/DL — SIGNIFICANT CHANGE UP (ref 13–17)
IMM GRANULOCYTES NFR BLD AUTO: 0.3 % — SIGNIFICANT CHANGE UP (ref 0–0.9)
LYMPHOCYTES # BLD AUTO: 1.81 K/UL — SIGNIFICANT CHANGE UP (ref 1–3.3)
LYMPHOCYTES # BLD AUTO: 19 % — SIGNIFICANT CHANGE UP (ref 13–44)
MCHC RBC-ENTMCNC: 31.7 PG — SIGNIFICANT CHANGE UP (ref 27–34)
MCHC RBC-ENTMCNC: 33.3 GM/DL — SIGNIFICANT CHANGE UP (ref 32–36)
MCV RBC AUTO: 95.3 FL — SIGNIFICANT CHANGE UP (ref 80–100)
MONOCYTES # BLD AUTO: 0.79 K/UL — SIGNIFICANT CHANGE UP (ref 0–0.9)
MONOCYTES NFR BLD AUTO: 8.3 % — SIGNIFICANT CHANGE UP (ref 2–14)
NEUTROPHILS # BLD AUTO: 6.71 K/UL — SIGNIFICANT CHANGE UP (ref 1.8–7.4)
NEUTROPHILS NFR BLD AUTO: 70.3 % — SIGNIFICANT CHANGE UP (ref 43–77)
NRBC # BLD: 0 /100 WBCS — SIGNIFICANT CHANGE UP (ref 0–0)
PLATELET # BLD AUTO: 292 K/UL — SIGNIFICANT CHANGE UP (ref 150–400)
POTASSIUM SERPL-MCNC: 3.7 MMOL/L — SIGNIFICANT CHANGE UP (ref 3.5–5.3)
POTASSIUM SERPL-SCNC: 3.7 MMOL/L — SIGNIFICANT CHANGE UP (ref 3.5–5.3)
PROCALCITONIN SERPL-MCNC: 0.06 NG/ML — SIGNIFICANT CHANGE UP (ref 0.02–0.1)
RBC # BLD: 4.23 M/UL — SIGNIFICANT CHANGE UP (ref 4.2–5.8)
RBC # FLD: 13.9 % — SIGNIFICANT CHANGE UP (ref 10.3–14.5)
SODIUM SERPL-SCNC: 142 MMOL/L — SIGNIFICANT CHANGE UP (ref 135–145)
WBC # BLD: 9.54 K/UL — SIGNIFICANT CHANGE UP (ref 3.8–10.5)
WBC # FLD AUTO: 9.54 K/UL — SIGNIFICANT CHANGE UP (ref 3.8–10.5)

## 2024-07-07 PROCEDURE — 93010 ELECTROCARDIOGRAM REPORT: CPT

## 2024-07-07 PROCEDURE — 71045 X-RAY EXAM CHEST 1 VIEW: CPT | Mod: 26

## 2024-07-07 PROCEDURE — 99223 1ST HOSP IP/OBS HIGH 75: CPT | Mod: GC

## 2024-07-07 PROCEDURE — 99285 EMERGENCY DEPT VISIT HI MDM: CPT

## 2024-07-07 RX ORDER — ACETAMINOPHEN 325 MG
650 TABLET ORAL EVERY 6 HOURS
Refills: 0 | Status: DISCONTINUED | OUTPATIENT
Start: 2024-07-07 | End: 2024-07-08

## 2024-07-07 RX ORDER — ENOXAPARIN SODIUM 100 MG/ML
40 INJECTION SUBCUTANEOUS EVERY 24 HOURS
Refills: 0 | Status: DISCONTINUED | OUTPATIENT
Start: 2024-07-07 | End: 2024-07-10

## 2024-07-07 RX ORDER — AZITHROMYCIN 250 MG/1
500 TABLET, FILM COATED ORAL ONCE
Refills: 0 | Status: DISCONTINUED | OUTPATIENT
Start: 2024-07-07 | End: 2024-07-07

## 2024-07-07 RX ORDER — CEFTRIAXONE SODIUM 500 MG
1000 VIAL (EA) INJECTION ONCE
Refills: 0 | Status: COMPLETED | OUTPATIENT
Start: 2024-07-07 | End: 2024-07-07

## 2024-07-07 RX ORDER — LAMOTRIGINE 100 MG/1
100 TABLET ORAL EVERY 12 HOURS
Refills: 0 | Status: DISCONTINUED | OUTPATIENT
Start: 2024-07-07 | End: 2024-07-10

## 2024-07-07 RX ORDER — SODIUM CHLORIDE 0.9 % (FLUSH) 0.9 %
1000 SYRINGE (ML) INJECTION ONCE
Refills: 0 | Status: COMPLETED | OUTPATIENT
Start: 2024-07-07 | End: 2024-07-07

## 2024-07-07 RX ADMIN — Medication 1000 MILLILITER(S): at 14:26

## 2024-07-07 RX ADMIN — ENOXAPARIN SODIUM 40 MILLIGRAM(S): 100 INJECTION SUBCUTANEOUS at 22:26

## 2024-07-07 RX ADMIN — AZITHROMYCIN 255 MILLIGRAM(S): 250 TABLET, FILM COATED ORAL at 17:39

## 2024-07-07 RX ADMIN — Medication 650 MILLIGRAM(S): at 23:25

## 2024-07-07 RX ADMIN — LAMOTRIGINE 100 MILLIGRAM(S): 100 TABLET ORAL at 22:26

## 2024-07-07 RX ADMIN — Medication 650 MILLIGRAM(S): at 22:25

## 2024-07-07 RX ADMIN — Medication 100 MILLIGRAM(S): at 15:46

## 2024-07-07 NOTE — PATIENT PROFILE ADULT - FUNCTIONAL ASSESSMENT - DAILY ACTIVITY 6.
Pt came to ed via triage. Pt states that his cats came to his bedroom, and the pt followed the cats to the basement and the basement smelled \"different. Pt states he opened up the windows and his cats ran out of his house. States he \"saw this thing\" on the history channel where cats will show you where leaks are at and will run out of the house.     Pt complaints of headache, states that headache been present for the past couple days. However, feels since the cats came upstairs his headache could be controlled with aspirin. Pt states he has hx of headaches.     VSS, NAD, AOx4. Patient resting in bed, respirations even and unlabored. Call light in reach.     3 = A little assistance

## 2024-07-07 NOTE — H&P ADULT - ATTENDING COMMENTS
73M with PMHx of seizure presenting for several day history of weakness and poor PO intake. [facility]. It seems he finished a course of antibiotics for both (overlapping infection). His diarrhea which he had resolved. He has no upper respiratory symptoms. Patient is presenting today due to above symptoms of weakness and poor PO intake. Lives in an [place] and it may be unsafe.     VSS no fever, hemodynamically stable  Frail patient    Labs personally reviewed    CXR personally reviewed by me: possible resolving consolidation in RLL    A/P:    - Deconditioning possibly from recent infection. No signs of treatment failure. Monitor off antibiotics. Afebrile and no leukocytosis  - PT consult  - Dysphagia screen  - Obtain further collateral from above infections

## 2024-07-07 NOTE — ED ADULT NURSE NOTE - NS ED NURSE RECORD ANOTHER VITAL SIGN
Pt would like to stop her zoloft and thinks she does not need any antidepressants at this point  Pt  to f/u at her next routine OV and if she changes her mind we can discuss sooner  Yes

## 2024-07-07 NOTE — H&P ADULT - HISTORY OF PRESENT ILLNESS
#Discharge: do not delete    Patient is __ yo M/F with past medical history of _____ presented with _____, found to have _____ (one liner)    Hospital course (by problem):     Patient was discharged to: (home/[facility]/acute rehab/hospice, etc, and with what services – home health PT/RN? Home O2?)    New medications:   Changes to old medications:  Medications that were stopped:    Items to follow up as outpatient:    Physical exam at the time of discharge:    Constitutional: resting comfortably in bed; NAD  Head: NC/AT  Eyes: PERRL, EOMI, anicteric sclera  ENT: no nasal discharge; MMM  Neck: supple; no JVD or thyromegaly  Respiratory: CTA B/L; no W/R/R, no retractions  Cardiac: +S1/S2; RRR; no M/R/G  Gastrointestinal: abdomen soft, NT/ND; no rebound or guarding; +BSx4  Back: spine midline, no bony tenderness or step-offs; no CVAT B/L  Extremities: WWP, no clubbing or cyanosis; no peripheral edema  Musculoskeletal: NROM x4; no joint swelling, tenderness or erythema  Vascular: 2+ radial, DP/PT pulses B/L  Dermatologic: skin warm, dry and intact; no rashes, wounds, or scars  Lymphatic: no submandibular or cervical LAD  Neurologic: AAOx3; CNII-XII grossly intact; no focal deficits  Psychiatric: affect and characteristics of appearance, verbalizations, behaviors are appropriate HPI: Patient is a 72 y/o M with PMH of seizures on lamotrigine, osteoporosis presents to ED with weakness and inability to care for self at home.  Pt states he was recently admitted to [facility] side for one week and discharged on 7/3/24.  Pt admitted for PNA and sent home on levaquin and cefpodoxime and took a few doses.  Pt since then has had difficult time at home caring for self including getting food.  Pt having difficult time walking secondary to generalized weakness.  Denies smoking or alcohol use.  No recent fever or chills.    Per chart review, patient was admitted to [facility] on 6/26/24 with watery diarrhea and abdominal pain, found to be tachycardic with leukocytosis (WBC 20.4, neutrophil predominant) 2/2 sepsis i/s/o enterocolitis. Treated with CTX and flagyl with resolution of diarrhea, stool studies never sent.     In the ED:  Vital Signs: T 98.7 F, , /72, RR 17, SpO2 96% on room air  Labs: within normal limits  EKG: pending  CXR: RLL field consolidation  Interventions: 1L NS bolus, CTX 1g, azithromycin 500mg  Consults: PT HPI: Patient is a 74 y/o M with PMH of seizures on lamotrigine, osteoporosis on alendronate  presents to ED with weakness and inability to care for self at home. Pt states he was recently admitted to [facility] side for one week and discharged on 7/3/24. Patient was found to have enterocolitis and given flagyl and ceftriaxone. Patient reported going to [facility] on 7/4 due to worsening symptoms. Patient was discharged same day on levaquin and cefpodoxime for concerns of PNA and took a few doses.  Pt since then has had difficult time at home caring for self including getting food.  Pt reported having difficult time walking secondary to generalized weakness.  Patient reported his symptoms initially started about  2-3 months ago and has been getting worser. He's had watery diarrhea for over a week and  reported last bowel movement to be on 7/5, has not has one since then due to having poor po intake. Patient reported having a subjective fever, body aches, malaise, headaches. and generalized weakness. with difficulty standing and walking. denied N,V, abdominal pain, or melena. He reported having urinary hesitency but no dysuria or hematuria.    Per chart review, patient was admitted to [facility] on 6/26/24 with watery diarrhea and abdominal pain, found to be tachycardic with leukocytosis (WBC 20.4, neutrophil predominant) 2/2 sepsis i/s/o enterocolitis. Treated with CTX and flagyl with resolution of diarrhea, stool studies never sent.     In the ED:  Vital Signs: T 98.7 F, , /72, RR 17, SpO2 96% on room air  Labs: within normal limits  EKG: pending  CXR: RLL field consolidation  Interventions: 1L NS bolus, CTX 1g, azithromycin 500mg  Consults: PT HPI: Patient is a 74 y/o M with PMH of seizures on lamotrigine, osteoporosis on alendronate  presents to ED with weakness and inability to care for self at home. Pt states he was recently admitted to [facility] side for one week and discharged on 7/3/24. Patient was found to have enterocolitis and given flagyl and ceftriaxone. Patient reported going to [facility] on 7/4 due to worsening symptoms. Patient was discharged same day on levaquin and cefpodoxime for concerns of PNA and took a few doses.  Pt since then has had difficult time at home caring for self including getting food.  Pt reported having difficult time walking secondary to generalized weakness.  Patient reported his symptoms initially started about  2-3 months ago and has been getting worse. He's had watery diarrhea for over a week and  reported last bowel movement to be on 7/5, has not has one since then due to having poor po intake. Patient reported having a subjective fever, body aches, malaise, headaches. and generalized weakness. with difficulty standing and walking. denied N,V, abdominal pain, or melena. He reported having urinary hesitency but no dysuria or hematuria.    Per chart review, patient was admitted to [facility] on 6/26/24 with watery diarrhea and abdominal pain, found to be tachycardic with leukocytosis (WBC 20.4, neutrophil predominant) 2/2 sepsis i/s/o enterocolitis. Treated with CTX and flagyl with resolution of diarrhea, stool studies never sent.     In the ED:  Vital Signs: T 98.7 F, , /74, RR 17, SpO2 96% on room air  Labs: within normal limits  EKG: pending  CXR: RLL field consolidation  Interventions: 1L NS bolus, CTX 1g, azithromycin 500mg  Consults: PT HPI: Patient is a 74 y/o M with PMH of seizures on lamotrigine, osteoporosis on alendronate  presents to ED with weakness and inability to care for self at home. Pt states he was recently admitted to [facility] side for one week and discharged on 7/3/24. Patient was found to have enterocolitis and given flagyl and ceftriaxone. Patient reported going to [facility] on 7/4 due to worsening symptoms. Patient was discharged same day on levaquin and cefpodoxime for concerns of PNA and took a few doses.  Pt since then has had difficult time at home caring for self including getting food.  Pt reported having difficult time walking secondary to generalized weakness.  Patient reported his symptoms initially started about  2-3 months ago and has been getting worse since. He's had watery diarrhea for over a week and  reported last bowel movement to be on 7/5, has not has one since then due to having poor po intake. Patient reported having a subjective fever, body aches, malaise, headaches, and generalized weakness. with difficulty standing and walking. He denied N,V, abdominal pain, or melena. He reported having urinary hesitancy but no dysuria or hematuria.    Per chart review, patient was admitted to [facility] on 6/26/24 with watery diarrhea and abdominal pain, found to be tachycardic with leukocytosis (WBC 20.4, neutrophil predominant) 2/2 sepsis i/s/o enterocolitis. Treated with CTX and flagyl with resolution of diarrhea, stool studies never sent.     In the ED:  Vital Signs: T 98.7 F, , /74, RR 17, SpO2 96% on room air  Labs: within normal limits  EKG: NSR.   CXR: RLL field consolidation  Interventions: 1L NS bolus, CTX 1g, azithromycin 500mg  Consults: PT Patient is a 74 y/o M with PMH of seizures on lamotrigine, osteoporosis on alendronate  presents to ED with weakness and inability to care for self at home. Pt states he was recently admitted to [facility] side for one week and discharged on 7/3/24. Patient was found to have enterocolitis and given flagyl and ceftriaxone. Patient reported going to [facility] on 7/4 due to worsening symptoms. Patient was discharged same day on levaquin and cefpodoxime for concerns of PNA and took a few doses.  Pt since then has had difficult time at home caring for self including getting food.  Pt reported having difficult time walking secondary to generalized weakness.  Patient reported his symptoms initially started about  2-3 months ago and has been getting worse since. He's had watery diarrhea for over a week and  reported last bowel movement to be on 7/5, has not has one since then due to having poor po intake. Patient reported having a subjective fever, body aches, malaise, headaches, and generalized weakness. with difficulty standing and walking. He denied N,V, abdominal pain, or melena. He reported having urinary hesitancy but no dysuria or hematuria.    Per chart review, patient was admitted to [facility] on 6/26/24 with watery diarrhea and abdominal pain, found to be tachycardic with leukocytosis (WBC 20.4, neutrophil predominant) 2/2 sepsis i/s/o enterocolitis. Treated with CTX and flagyl with resolution of diarrhea, stool studies never sent.     In the ED:  Vital Signs: T 98.7 F, , /74, RR 17, SpO2 96% on room air  Labs: within normal limits  EKG: NSR.   CXR: RLL field consolidation  Interventions: 1L NS bolus, CTX 1g, azithromycin 500mg  Consults: PT

## 2024-07-07 NOTE — ED ADULT NURSE NOTE - CHIEF COMPLAINT QUOTE
Pt BIBEMS for generalized malaise x 1 week. Pt reports recent d/c from [facility] with pneumonia diagnosis, still taking prescribed abx. Denies cp, sob. Denies PMH.

## 2024-07-07 NOTE — PATIENT PROFILE ADULT - FALL HARM RISK - HARM RISK INTERVENTIONS

## 2024-07-07 NOTE — H&P ADULT - NSHPSOCIALHISTORY_GEN_ALL_CORE
Patient lives alone, Uses a cane to ambulate. Reported being able to perform ADLs before getting sick. Has been having difficulty walking due to generalized weakness.

## 2024-07-07 NOTE — H&P ADULT - NSHPPHYSICALEXAM_GEN_ALL_CORE
.  VITAL SIGNS:  T(C): 36.7 (07-07-24 @ 15:46), Max: 37.1 (07-07-24 @ 13:24)  T(F): 98 (07-07-24 @ 15:46), Max: 98.7 (07-07-24 @ 13:24)  HR: 94 (07-07-24 @ 15:46) (94 - 105)  BP: 100/57 (07-07-24 @ 15:46) (100/57 - 111/72)  BP(mean): --  RR: 17 (07-07-24 @ 15:46) (17 - 17)  SpO2: 95% (07-07-24 @ 15:46) (95% - 96%)  Wt(kg): --    PHYSICAL EXAM:    Constitutional: resting comfortably in bed; NAD  Head: NC/AT  Eyes: PERRL, EOMI, anicteric sclera  ENT: no nasal discharge; uvula midline, no oropharyngeal erythema or exudates; MMM  Neck: supple; no JVD or thyromegaly  Respiratory: CTA B/L; no W/R/R, no retractions  Cardiac: +S1/S2; RRR; no M/R/G; PMI non-displaced  Gastrointestinal: abdomen soft, NT/ND; no rebound or guarding; +BSx4  Back: spine midline, no bony tenderness or step-offs; no CVAT B/L  Extremities: WWP, no clubbing or cyanosis; no peripheral edema  Musculoskeletal: NROM x4; no joint swelling, tenderness or erythema  Vascular: 2+ radial, DP/PT pulses B/L  Dermatologic: skin warm, dry and intact; no rashes, wounds, or scars  Lymphatic: no submandibular or cervical LAD  Neurologic: AAOx3; CNII-XII grossly intact; no focal deficits  Psychiatric: affect and characteristics of appearance, verbalizations, behaviors are appropriate .  VITAL SIGNS:  T(C): 36.7 (07-07-24 @ 15:46), Max: 37.1 (07-07-24 @ 13:24)  T(F): 98 (07-07-24 @ 15:46), Max: 98.7 (07-07-24 @ 13:24)  HR: 94 (07-07-24 @ 15:46) (94 - 105)  BP: 100/57 (07-07-24 @ 15:46) (100/57 - 111/72)  BP(mean): --  RR: 17 (07-07-24 @ 15:46) (17 - 17)  SpO2: 95% (07-07-24 @ 15:46) (95% - 96%)  Wt(kg): --    PHYSICAL EXAM:    Constitutional: resting comfortably in bed; NAD  Head: NC/AT  Eyes: EOMI, anicteric sclera  ENT: no nasal discharge; uvula midline, no oropharyngeal erythema or exudates; MMM  Neck: supple; no JVD or thyromegaly  Respiratory: CTA B/L; no W/R/R, no retractions  Cardiac: +S1/S2; RRR; no M/R/G; PMI non-displaced  Gastrointestinal: abdomen soft, NT/ND; no rebound or guarding; +BSx4  Back: spine midline, no bony tenderness or step-offs; no CVAT B/L  Extremities: WWP, no clubbing or cyanosis; no peripheral edema  Musculoskeletal: NROM x4; no joint swelling, tenderness or erythema  Vascular: 2+ radial, DP/PT pulses B/L  Dermatologic: skin warm, dry and intact; no rashes, wounds, or scars  Lymphatic: no submandibular or cervical LAD  Neurologic: AAOx3; CNII-XII grossly intact; no focal deficits  Psychiatric: affect and characteristics of appearance, verbalizations, behaviors are appropriate .  VITAL SIGNS:  T(C): 36.7 (07-07-24 @ 15:46), Max: 37.1 (07-07-24 @ 13:24)  T(F): 98 (07-07-24 @ 15:46), Max: 98.7 (07-07-24 @ 13:24)  HR: 94 (07-07-24 @ 15:46) (94 - 105)  BP: 100/57 (07-07-24 @ 15:46) (100/57 - 111/72)  BP(mean): --  RR: 17 (07-07-24 @ 15:46) (17 - 17)  SpO2: 95% (07-07-24 @ 15:46) (95% - 96%)  Wt(kg): --    PHYSICAL EXAM:    Constitutional: NAD, appears frail.  Head: NC/AT  Eyes: EOMI, anicteric sclera  ENT: no nasal discharge; uvula midline, no oropharyngeal erythema or exudates; MMM  Neck: supple; no JVD or thyromegaly  Respiratory: CTA B/L; no W/R/R, no retractions  Cardiac: +S1/S2; RRR; no M/R/G; PMI non-displaced  Gastrointestinal: abdomen soft, NT/ND; no rebound or guarding; +BSx4  Back: spine midline, no bony tenderness or step-offs; no CVAT B/L  Extremities: WWP, no clubbing or cyanosis; no peripheral edema  Musculoskeletal: NROM x4; no joint swelling, tenderness or erythema  Vascular: 2+ radial, DP/PT pulses B/L  Dermatologic: skin warm, dry and intact; no rashes, wounds, or scars  Lymphatic: no submandibular or cervical LAD  Neurologic: AAOx3; CNII-XII grossly intact; no focal deficits  Psychiatric: affect and characteristics of appearance, verbalizations, behaviors are appropriate

## 2024-07-07 NOTE — H&P ADULT - PROBLEM SELECTOR PLAN 3
Patient with hx of seizures, reportedly on lamotrigine 100mg bid, unable to verify recent prescriptions on SureScripts.   - c/w home lamotrigine  - seizure precautions Patient with hx of seizures, reportedly on lamotrigine 100mg bid, unable to verify recent prescriptions on [employer]Scripts.   - c/w home lamotrigine 100mg  - seizure precautions  - med rec in the am.

## 2024-07-07 NOTE — ED PROVIDER NOTE - PHYSICAL EXAMINATION
VITAL SIGNS: I have reviewed nursing notes and confirm.  CONSTITUTIONAL: weak appearing  SKIN: Agree with RN documentation regarding decubitus evaluation. Remainder of skin exam is warm and dry, no acute rash.  HEAD: Normocephalic; atraumatic.  EYES: PERRL, EOM intact; conjunctiva and sclera clear.  ENT: No nasal discharge; airway clear.  NECK: Supple; non tender.  CARD: S1, S2 normal; no murmurs, gallops, or rubs. Regular rate and rhythm.  RESP: No wheezes, rales or rhonchi.  ABD: Normal bowel sounds; soft; non-distended; non-tender; no hepatosplenomegaly.  EXT: Normal ROM. No clubbing, cyanosis or edema.  LYMPH: No acute cervical adenopathy.  NEURO: Alert, oriented. Grossly unremarkable.  PSYCH: Cooperative, appropriate.

## 2024-07-07 NOTE — H&P ADULT - PROBLEM SELECTOR PLAN 2
#R/O Pneumonia  Patient with recent admission to [facility] for ?PNA v. enterocolitis. VSS and labs wnl, however CXR with ?consolidation RLL. Discharged from [place] on levaquin and cefpodoxime, unclear if compelted course. Given CTX and Azithromycin in the ED.  - aspiration precautions  - add on procal, consider starting antibiotics if elevated otherwise would monitor off abx  - f/u blood cultures #R/O Pneumonia  Patient with recent admission to [facility]. PNA v. enterocolitis. VSS and labs wnl, however CXR with ?consolidation RLL. Discharged from [place] on levaquin and cefpodoxime, unclear if compelted course. Given CTX and Azithromycin in the ED.  - aspiration precautions  - add on procal, consider starting antibiotics if elevated otherwise would monitor off abx  - f/u blood cultures #R/O Pneumonia  Patient with recent admission to [facility]. PNA v. enterocolitis. VSS and labs wnl, however CXR with ?consolidation RLL. Discharged from [place] on levaquin and cefpodoxime, unclear if compelted course. Given CTX and Azithromycin in the ED. No signs of overt infection. no cough on exam and no leukocytosis.  - aspiration precautions  - f/u blood cultures   - hold off abx until UA, procal. #R/O Pneumonia  Patient with recent admission to [facility]. PNA v. enterocolitis. VSS and labs wnl, however CXR with ?consolidation RLL. Discharged from [place] on Levaquin and cefpodoxime, unclear if completed course. Given CTX and Azithromycin in the ED. No signs of overt infection. no cough, fever, or leukocytosis.  - aspiration precautions  - f/u blood cultures   - hold off abx , procal: 0.06.  - f/u UA

## 2024-07-07 NOTE — H&P ADULT - PROBLEM SELECTOR PLAN 5
F: s/p 1L NS   E: replete as needed  N: regular diet pending dysphagia screening  DVT ppx: lovenox  Dispo: RMF, pending PT

## 2024-07-07 NOTE — ED ADULT NURSE NOTE - OBJECTIVE STATEMENT
72 yo M KING co weakness- currently taking Levofloxacin and Cefpodoxime for PNA dx by [facility]. compliant with ABX regimen. Says called 911 because his HA and generalized malaise which he has had for 1 month has worsened. NO recent falls although eos report a mechanical trip and fall a few months ago while @ home. Denies fevers, chills, CP, SOB, abdominal pain.

## 2024-07-07 NOTE — H&P ADULT - ASSESSMENT
Patient is a 72 y/o M with PMH of seizures on lamotrigine, osteoporosis presents to ED with weakness and inability to care for self at home, admitted for safe discharge planning and PT eval.

## 2024-07-07 NOTE — ED PROVIDER NOTE - CLINICAL SUMMARY MEDICAL DECISION MAKING FREE TEXT BOX
72 y/o m with PMH of seizures on lamotrigine, osteoporosis presents to ED with weakness and inability to care for self at home.  Pt states he was recently admitted to [facility] side for one week and discharged on 7/3/24.  Pt admitted for PNA and sent home on levaquin and cefpodoxime and took a few doses.  Pt since then has had difficult time at home caring for self including getting food.  Pt having difficult time walking secondary to generalized weakness.  Denies smoking or alcohol use.  No recent fever or chills.     VSS  PE weak appearing and having difficulty ambulating with cane  Labs wnl  CXR + right sided pna  blood cultures, IV ceft and IV zithromax in ED  Pt seen by CM and SW  Unsafe discharge for home as pt can not ambulate  Discussed with JUAN CARLOS who agrees on admission

## 2024-07-07 NOTE — H&P ADULT - PROBLEM SELECTOR PLAN 4
Patient with hx of osteoporosis, not currently taking meds but states he used to take alendronate ?everyday prior to onset of infection.   - PT consult  - f/u  vit D level

## 2024-07-07 NOTE — ED PROVIDER NOTE - OBJECTIVE STATEMENT
74 y/o m with PMH of seizures on lamotrigine, osteoporosis presents to ED with weakness and inability to care for self at home.  Pt states he was recently admitted to [facility] side for one week and discharged on 7/3/24.  Pt admitted for PNA and sent home on levaquin and cefpodoxime and took a few doses.  Pt since then has had difficult time at home caring for self including getting food.  Pt having difficult time walking secondary to generalized weakness.  Denies smoking or alcohol use.  No recent fever or chills.

## 2024-07-07 NOTE — H&P ADULT - NSHPLABSRESULTS_GEN_ALL_CORE
13.4   9.54  )-----------( 292      ( 07 Jul 2024 13:43 )             40.3       07-07    142  |  105  |  9   ----------------------------<  116<H>  3.7   |  24  |  0.73    Ca    9.4      07 Jul 2024 13:43                Urinalysis Basic - ( 07 Jul 2024 13:43 )    Color: x / Appearance: x / SG: x / pH: x  Gluc: 116 mg/dL / Ketone: x  / Bili: x / Urobili: x   Blood: x / Protein: x / Nitrite: x   Leuk Esterase: x / RBC: x / WBC x   Sq Epi: x / Non Sq Epi: x / Bacteria: x            Lactate Trend            CAPILLARY BLOOD GLUCOSE

## 2024-07-07 NOTE — H&P ADULT - PROBLEM SELECTOR PLAN 1
Patient with recent admission to [facility], discharged on 7/3 to home, unable to care for self including with eating/ambulating. Diagnosed with infection at the time ?PNA v. enterocolitis and discharged on antibiotics. Does not meet sepsis criteria on admission. Electrolytes wnl. Low concern for metabolic causes of weakness, no focal deficits on exam which makes stroke unlikely. Likely weakness 2/2 deconditioning given recent hospital stay and poor access to nutrition.   - bedside dysphagia, then start regular diet  - PT consult, f/u recs  - nutrition consult  - r/o reversible causes --> f/u B12, TSH  - SW consult Patient with recent admission to [facility], discharged on 7/3 to home, unable to care for self including with eating/ambulating. Diagnosed with infection at the time ?PNA v. enterocolitis and discharged on antibiotics. Does not meet sepsis criteria on admission. Electrolytes wnl. Low concern for metabolic causes of weakness, no focal deficits on exam which makes stroke unlikely. Likely weakness 2/2 deconditioning given recent hospital stay and poor access to nutrition.   - bedside dysphagia, then start regular diet  - PT consult, f/u recs  - nutrition consult  - r/o reversible causes --> f/u B12, TSH  - UA, bladder scan due to his urinary hesitancy.   - SW consult Patient with recent admission to [facility], discharged on 7[facility]3 [facility], unable to care for self including with eating/ambulating. Diagnosed with infection at the time PNA v. enterocolitis and discharged on antibiotics. Does not meet sepsis criteria on admission. Electrolytes wnl. Low concern for metabolic causes of weakness, no focal deficits on exam which makes stroke unlikely. Likely weakness 2/2 deconditioning given recent hospital stay and poor access to nutrition.   - Passed bedside dysphagia, will start regular diet  - PT consult, f/u recs.  - nutrition consult  - r/o reversible causes --> f/u B12, TSH  - UA, bladder scan due to his urinary hesitancy.   - SW consult

## 2024-07-07 NOTE — ED ADULT TRIAGE NOTE - CHIEF COMPLAINT QUOTE
Pt BIBEMS for generalized malaise x 1 week. Pt reports recent d/c from [facility] with pneumonia diagnosis, still taking prescribed abx. Denies cp, sob. Denies PMH.

## 2024-07-08 DIAGNOSIS — M54.9 DORSALGIA, UNSPECIFIED: ICD-10-CM

## 2024-07-08 DIAGNOSIS — E43 UNSPECIFIED SEVERE PROTEIN-CALORIE MALNUTRITION: ICD-10-CM

## 2024-07-08 DIAGNOSIS — G47.00 INSOMNIA, UNSPECIFIED: ICD-10-CM

## 2024-07-08 DIAGNOSIS — N17.9 ACUTE KIDNEY FAILURE, UNSPECIFIED: ICD-10-CM

## 2024-07-08 DIAGNOSIS — R51.9 HEADACHE, UNSPECIFIED: ICD-10-CM

## 2024-07-08 LAB
24R-OH-CALCIDIOL SERPL-MCNC: 34.2 NG/ML — SIGNIFICANT CHANGE UP (ref 30–80)
A1C WITH ESTIMATED AVERAGE GLUCOSE RESULT: 5.6 % — SIGNIFICANT CHANGE UP (ref 4–5.6)
ADD ON TEST-SPECIMEN IN LAB: SIGNIFICANT CHANGE UP
ADD ON TEST-SPECIMEN IN LAB: SIGNIFICANT CHANGE UP
ALBUMIN SERPL ELPH-MCNC: 3.9 G/DL — SIGNIFICANT CHANGE UP (ref 3.3–5)
ALP SERPL-CCNC: 80 U/L — SIGNIFICANT CHANGE UP (ref 40–120)
ALT FLD-CCNC: 23 U/L — SIGNIFICANT CHANGE UP (ref 10–45)
ANION GAP SERPL CALC-SCNC: 10 MMOL/L — SIGNIFICANT CHANGE UP (ref 5–17)
ANION GAP SERPL CALC-SCNC: 9 MMOL/L — SIGNIFICANT CHANGE UP (ref 5–17)
AST SERPL-CCNC: 14 U/L — SIGNIFICANT CHANGE UP (ref 10–40)
BILIRUB SERPL-MCNC: 0.2 MG/DL — SIGNIFICANT CHANGE UP (ref 0.2–1.2)
BUN SERPL-MCNC: 15 MG/DL — SIGNIFICANT CHANGE UP (ref 7–23)
BUN SERPL-MCNC: 19 MG/DL — SIGNIFICANT CHANGE UP (ref 7–23)
CALCIUM SERPL-MCNC: 8.8 MG/DL — SIGNIFICANT CHANGE UP (ref 8.4–10.5)
CALCIUM SERPL-MCNC: 9.1 MG/DL — SIGNIFICANT CHANGE UP (ref 8.4–10.5)
CHLORIDE SERPL-SCNC: 105 MMOL/L — SIGNIFICANT CHANGE UP (ref 96–108)
CHLORIDE SERPL-SCNC: 105 MMOL/L — SIGNIFICANT CHANGE UP (ref 96–108)
CO2 SERPL-SCNC: 23 MMOL/L — SIGNIFICANT CHANGE UP (ref 22–31)
CO2 SERPL-SCNC: 25 MMOL/L — SIGNIFICANT CHANGE UP (ref 22–31)
CREAT SERPL-MCNC: 0.78 MG/DL — SIGNIFICANT CHANGE UP (ref 0.5–1.3)
CREAT SERPL-MCNC: 1.06 MG/DL — SIGNIFICANT CHANGE UP (ref 0.5–1.3)
CRP SERPL-MCNC: <3 MG/L — SIGNIFICANT CHANGE UP (ref 0–4)
EGFR: 75 ML/MIN/1.73M2 — SIGNIFICANT CHANGE UP
EGFR: 95 ML/MIN/1.73M2 — SIGNIFICANT CHANGE UP
ERYTHROCYTE [SEDIMENTATION RATE] IN BLOOD: 7 MM/HR — SIGNIFICANT CHANGE UP
ESTIMATED AVERAGE GLUCOSE: 114 MG/DL — SIGNIFICANT CHANGE UP (ref 68–114)
GLUCOSE BLDC GLUCOMTR-MCNC: 121 MG/DL — HIGH (ref 70–99)
GLUCOSE SERPL-MCNC: 103 MG/DL — HIGH (ref 70–99)
GLUCOSE SERPL-MCNC: 96 MG/DL — SIGNIFICANT CHANGE UP (ref 70–99)
HCT VFR BLD CALC: 36.8 % — LOW (ref 39–50)
HGB BLD-MCNC: 11.8 G/DL — LOW (ref 13–17)
MAGNESIUM SERPL-MCNC: 1.9 MG/DL — SIGNIFICANT CHANGE UP (ref 1.6–2.6)
MCHC RBC-ENTMCNC: 31.4 PG — SIGNIFICANT CHANGE UP (ref 27–34)
MCHC RBC-ENTMCNC: 32.1 GM/DL — SIGNIFICANT CHANGE UP (ref 32–36)
MCV RBC AUTO: 97.9 FL — SIGNIFICANT CHANGE UP (ref 80–100)
NRBC # BLD: 0 /100 WBCS — SIGNIFICANT CHANGE UP (ref 0–0)
PHOSPHATE SERPL-MCNC: 3.8 MG/DL — SIGNIFICANT CHANGE UP (ref 2.5–4.5)
PLATELET # BLD AUTO: 258 K/UL — SIGNIFICANT CHANGE UP (ref 150–400)
POTASSIUM SERPL-MCNC: 3.6 MMOL/L — SIGNIFICANT CHANGE UP (ref 3.5–5.3)
POTASSIUM SERPL-MCNC: 3.6 MMOL/L — SIGNIFICANT CHANGE UP (ref 3.5–5.3)
POTASSIUM SERPL-SCNC: 3.6 MMOL/L — SIGNIFICANT CHANGE UP (ref 3.5–5.3)
POTASSIUM SERPL-SCNC: 3.6 MMOL/L — SIGNIFICANT CHANGE UP (ref 3.5–5.3)
PROT SERPL-MCNC: 6.9 G/DL — SIGNIFICANT CHANGE UP (ref 6–8.3)
RBC # BLD: 3.76 M/UL — LOW (ref 4.2–5.8)
RBC # FLD: 14.3 % — SIGNIFICANT CHANGE UP (ref 10.3–14.5)
SODIUM SERPL-SCNC: 137 MMOL/L — SIGNIFICANT CHANGE UP (ref 135–145)
SODIUM SERPL-SCNC: 140 MMOL/L — SIGNIFICANT CHANGE UP (ref 135–145)
T4 FREE SERPL-MCNC: 1.7 NG/DL — SIGNIFICANT CHANGE UP (ref 0.93–1.7)
TSH SERPL-MCNC: 2.35 UIU/ML — SIGNIFICANT CHANGE UP (ref 0.27–4.2)
VIT B12 SERPL-MCNC: 1033 PG/ML — SIGNIFICANT CHANGE UP (ref 232–1245)
WBC # BLD: 8.76 K/UL — SIGNIFICANT CHANGE UP (ref 3.8–10.5)
WBC # FLD AUTO: 8.76 K/UL — SIGNIFICANT CHANGE UP (ref 3.8–10.5)

## 2024-07-08 PROCEDURE — 70498 CT ANGIOGRAPHY NECK: CPT | Mod: 26

## 2024-07-08 PROCEDURE — 70496 CT ANGIOGRAPHY HEAD: CPT | Mod: 26

## 2024-07-08 PROCEDURE — 99222 1ST HOSP IP/OBS MODERATE 55: CPT

## 2024-07-08 PROCEDURE — 70450 CT HEAD/BRAIN W/O DYE: CPT | Mod: 26,59

## 2024-07-08 PROCEDURE — 0042T: CPT

## 2024-07-08 PROCEDURE — 99233 SBSQ HOSP IP/OBS HIGH 50: CPT | Mod: GC

## 2024-07-08 RX ORDER — KETOROLAC TROMETHAMINE 30 MG/ML
15 INJECTION, SOLUTION INTRAMUSCULAR EVERY 6 HOURS
Refills: 0 | Status: DISCONTINUED | OUTPATIENT
Start: 2024-07-08 | End: 2024-07-08

## 2024-07-08 RX ORDER — TAMSULOSIN HYDROCHLORIDE 0.4 MG/1
0.8 CAPSULE ORAL AT BEDTIME
Refills: 0 | Status: DISCONTINUED | OUTPATIENT
Start: 2024-07-08 | End: 2024-07-08

## 2024-07-08 RX ORDER — LIDOCAINE HCL 28 MG/G
1 GEL TOPICAL DAILY
Refills: 0 | Status: DISCONTINUED | OUTPATIENT
Start: 2024-07-08 | End: 2024-07-10

## 2024-07-08 RX ORDER — ACETAMINOPHEN 325 MG
1000 TABLET ORAL EVERY 8 HOURS
Refills: 0 | Status: DISCONTINUED | OUTPATIENT
Start: 2024-07-08 | End: 2024-07-10

## 2024-07-08 RX ORDER — TAMSULOSIN HYDROCHLORIDE 0.4 MG/1
0.4 CAPSULE ORAL AT BEDTIME
Refills: 0 | Status: DISCONTINUED | OUTPATIENT
Start: 2024-07-09 | End: 2024-07-10

## 2024-07-08 RX ORDER — PREGABALIN 50 MG/1
25 CAPSULE ORAL EVERY 8 HOURS
Refills: 0 | Status: DISCONTINUED | OUTPATIENT
Start: 2024-07-08 | End: 2024-07-09

## 2024-07-08 RX ORDER — LIDOCAINE HCL 28 MG/G
1 GEL TOPICAL DAILY
Refills: 0 | Status: DISCONTINUED | OUTPATIENT
Start: 2024-07-08 | End: 2024-07-08

## 2024-07-08 RX ORDER — MORPHINE SULFATE 100 MG/1
2 TABLET, EXTENDED RELEASE ORAL EVERY 4 HOURS
Refills: 0 | Status: DISCONTINUED | OUTPATIENT
Start: 2024-07-08 | End: 2024-07-08

## 2024-07-08 RX ORDER — ACETAMINOPHEN 325 MG
1000 TABLET ORAL ONCE
Refills: 0 | Status: COMPLETED | OUTPATIENT
Start: 2024-07-08 | End: 2024-07-08

## 2024-07-08 RX ORDER — HYDROMORPHONE HCL 0.2 MG/ML
2 INJECTION, SOLUTION INTRAVENOUS EVERY 6 HOURS
Refills: 0 | Status: DISCONTINUED | OUTPATIENT
Start: 2024-07-08 | End: 2024-07-08

## 2024-07-08 RX ORDER — HYDROMORPHONE HCL 0.2 MG/ML
0.5 INJECTION, SOLUTION INTRAVENOUS ONCE
Refills: 0 | Status: DISCONTINUED | OUTPATIENT
Start: 2024-07-08 | End: 2024-07-08

## 2024-07-08 RX ORDER — THIAMINE HCL 100 MG
500 TABLET ORAL DAILY
Refills: 0 | Status: COMPLETED | OUTPATIENT
Start: 2024-07-08 | End: 2024-07-10

## 2024-07-08 RX ORDER — OXYCODONE HYDROCHLORIDE 100 MG/5ML
5 SOLUTION ORAL EVERY 6 HOURS
Refills: 0 | Status: DISCONTINUED | OUTPATIENT
Start: 2024-07-08 | End: 2024-07-09

## 2024-07-08 RX ORDER — GABAPENTIN
100 POWDER (GRAM) MISCELLANEOUS THREE TIMES A DAY
Refills: 0 | Status: DISCONTINUED | OUTPATIENT
Start: 2024-07-08 | End: 2024-07-08

## 2024-07-08 RX ADMIN — KETOROLAC TROMETHAMINE 15 MILLIGRAM(S): 30 INJECTION, SOLUTION INTRAMUSCULAR at 01:24

## 2024-07-08 RX ADMIN — LAMOTRIGINE 100 MILLIGRAM(S): 100 TABLET ORAL at 21:07

## 2024-07-08 RX ADMIN — LIDOCAINE HCL 1 PATCH: 28 GEL TOPICAL at 08:11

## 2024-07-08 RX ADMIN — LAMOTRIGINE 100 MILLIGRAM(S): 100 TABLET ORAL at 09:07

## 2024-07-08 RX ADMIN — OXYCODONE HYDROCHLORIDE 5 MILLIGRAM(S): 100 SOLUTION ORAL at 22:07

## 2024-07-08 RX ADMIN — HYDROMORPHONE HCL 0.5 MILLIGRAM(S): 0.2 INJECTION, SOLUTION INTRAVENOUS at 06:15

## 2024-07-08 RX ADMIN — Medication 1000 MILLIGRAM(S): at 04:03

## 2024-07-08 RX ADMIN — PREGABALIN 25 MILLIGRAM(S): 50 CAPSULE ORAL at 21:07

## 2024-07-08 RX ADMIN — KETOROLAC TROMETHAMINE 15 MILLIGRAM(S): 30 INJECTION, SOLUTION INTRAMUSCULAR at 01:40

## 2024-07-08 RX ADMIN — TAMSULOSIN HYDROCHLORIDE 0.8 MILLIGRAM(S): 0.4 CAPSULE ORAL at 21:07

## 2024-07-08 RX ADMIN — Medication 3 MILLIGRAM(S): at 21:07

## 2024-07-08 RX ADMIN — MORPHINE SULFATE 2 MILLIGRAM(S): 100 TABLET, EXTENDED RELEASE ORAL at 08:40

## 2024-07-08 RX ADMIN — Medication 1 TABLET(S): at 11:08

## 2024-07-08 RX ADMIN — ENOXAPARIN SODIUM 40 MILLIGRAM(S): 100 INJECTION SUBCUTANEOUS at 21:08

## 2024-07-08 RX ADMIN — Medication 400 MILLIGRAM(S): at 03:33

## 2024-07-08 RX ADMIN — HYDROMORPHONE HCL 0.5 MILLIGRAM(S): 0.2 INJECTION, SOLUTION INTRAVENOUS at 05:56

## 2024-07-08 RX ADMIN — OXYCODONE HYDROCHLORIDE 5 MILLIGRAM(S): 100 SOLUTION ORAL at 21:07

## 2024-07-08 RX ADMIN — Medication 105 MILLIGRAM(S): at 12:51

## 2024-07-08 RX ADMIN — Medication 10 MILLIGRAM(S): at 08:11

## 2024-07-08 RX ADMIN — HYDROMORPHONE HCL 2 MILLIGRAM(S): 0.2 INJECTION, SOLUTION INTRAVENOUS at 17:30

## 2024-07-08 RX ADMIN — MORPHINE SULFATE 2 MILLIGRAM(S): 100 TABLET, EXTENDED RELEASE ORAL at 08:11

## 2024-07-08 RX ADMIN — HYDROMORPHONE HCL 2 MILLIGRAM(S): 0.2 INJECTION, SOLUTION INTRAVENOUS at 18:30

## 2024-07-08 NOTE — DIETITIAN INITIAL EVALUATION ADULT - OTHER CALCULATIONS
Estimated needs based on IBW as dosing wt 125lb/56.7kg (78%). Needs adjusted for age, BMI, and malnutrition.

## 2024-07-08 NOTE — PROGRESS NOTE ADULT - PROBLEM SELECTOR PLAN 3
Patient with hx of seizures, reportedly on lamotrigine 100mg bid, unable to verify recent prescriptions on SureScripts.   - c/w home lamotrigine 100mg  - seizure precautions  - med rec in the am. Cr on admission = 0.73. Cr today = 1.06. Given change of 0.3 within 48 hours, pt meets criteria for COLIN.   Pt also describing urinary hesitancy.     - F/u CMP  - F/u UA  - Bladder scan q6hr Cr on admission = 0.73. Cr today = 1.06. Given change of 0.3 within 48 hours, pt meets criteria for COLIN.   Pt also describing urinary hesitancy.     PLAN:  - F/u CMP  - F/u UA  - Bladder scan q6hr

## 2024-07-08 NOTE — PROGRESS NOTE ADULT - SUBJECTIVE AND OBJECTIVE BOX
OVERNIGHT EVENTS: at 1 am, pt c/o HA rated 10/10 that he described as new. Covering MD noticed L sided facial droop. Stroke code was called. CT head was negative. Pt given toradol 15mL,  stroke pending at this time.    SUBJECTIVE / INTERVAL HPI: Patient seen and examined at bedside.       PHYSICAL EXAM:    General: NAD  HEENT: NC/AT; PERRL, anicteric sclera; MMM  Neck: supple  Cardiovascular: +S1/S2, RRR  Respiratory: CTA B/L; no W/R/R  Gastrointestinal: soft, NT/ND; +BSx4  Extremities: WWP; no edema, clubbing or cyanosis  Vascular: 2+ radial, DP/PT pulses B/L  Neurological: AAOx3; no focal deficits  Psychiatric: pleasant mood and affect  Dermatologic: no appreciable wounds or damage to the skin    VITAL SIGNS:  Vital Signs Last 24 Hrs  T(C): 36.7 (08 Jul 2024 06:12), Max: 37.1 (07 Jul 2024 13:24)  T(F): 98 (08 Jul 2024 06:12), Max: 98.7 (07 Jul 2024 13:24)  HR: 93 (08 Jul 2024 09:48) (80 - 105)  BP: 124/68 (08 Jul 2024 09:48) (93/53 - 148/69)  BP(mean): 67 (07 Jul 2024 20:30) (67 - 67)  RR: 18 (08 Jul 2024 06:12) (17 - 18)  SpO2: 97% (08 Jul 2024 09:48) (95% - 97%)    Parameters below as of 08 Jul 2024 09:48  Patient On (Oxygen Delivery Method): room air          MEDICATIONS:  MEDICATIONS  (STANDING):  enoxaparin Injectable 40 milliGRAM(s) SubCutaneous every 24 hours  lamoTRIgine 100 milliGRAM(s) Oral every 12 hours  melatonin 3 milliGRAM(s) Oral at bedtime  multivitamin 1 Tablet(s) Oral daily    MEDICATIONS  (PRN):  acetaminophen     Tablet .. 650 milliGRAM(s) Oral every 6 hours PRN Temp greater or equal to 38C (100.4F), Mild Pain (1 - 3)  ketorolac   Injectable 15 milliGRAM(s) IV Push every 6 hours PRN Moderate Pain (4 - 6)  lidocaine   4% Patch 1 Patch Transdermal daily PRN pain  morphine  - Injectable 2 milliGRAM(s) IV Push every 4 hours PRN Severe Pain (7 - 10)      ALLERGIES:  Allergies    No Known Allergies    Intolerances        LABS:                        11.8   8.76  )-----------( 258      ( 08 Jul 2024 06:55 )             36.8     07-08    137  |  105  |  15  ----------------------------<  96  3.6   |  23  |  1.06    Ca    8.8      08 Jul 2024 06:55  Phos  3.8     07-08  Mg     1.9     07-08        Urinalysis Basic - ( 08 Jul 2024 06:55 )    Color: x / Appearance: x / SG: x / pH: x  Gluc: 96 mg/dL / Ketone: x  / Bili: x / Urobili: x   Blood: x / Protein: x / Nitrite: x   Leuk Esterase: x / RBC: x / WBC x   Sq Epi: x / Non Sq Epi: x / Bacteria: x      CAPILLARY BLOOD GLUCOSE      POCT Blood Glucose.: 121 mg/dL (08 Jul 2024 00:36)      RADIOLOGY & ADDITIONAL TESTS: Reviewed. OVERNIGHT EVENTS: at 1 am, pt c/o HA rated 10/10 that he described as new. Covering MD noticed L sided facial droop. Stroke code was called. CT head was negative. Pt given toradol, ofirmev, dilaudid 0.5mg with no improvement in HA. Flexiril 10mg, morphine 2mg IV, lidocaine patch subsequently given. MR faulkner pending at this time.    SUBJECTIVE / INTERVAL HPI: Patient seen and examined at bedside. Pt resting comfortably in bed. States he still has a HA, describes pain as dull, 10/10, located behind the eyes. Denies sensitivity to light or sounds. Pt endorses that he has had similar HAs on and off for the past month that rarely improve. Reports dizziness as well. States he has had tingling in his feet on and off for the past few months. Denies fevers, chills, chest pain, SOB, abdominal pain, n/v/d. ROS otherwise negative.    Per pt, he takes morphine po 200mg in AM, 230mg in afternoon, and 100mg QHS. Per [facility], last RX in February. Pt states he has not had morphine in....      PHYSICAL EXAM:    General: NAD, frail, heavily tattooed  HEENT: NC/AT; PERRL, anicteric sclera; MMM  Neck: supple  Cardiovascular: +S1/S2, RRR  Respiratory: CTA B/L; no W/R/R  Gastrointestinal: soft, NT/ND; +BSx4  Extremities: WWP; no edema, clubbing or cyanosis  Vascular: 2+ radial, DP/PT pulses B/L  Neurological: AAOx3; no focal deficits, strength 5/5 in b/l UE and b/l LE, sensation grossly intact  Psychiatric: pleasant mood and affect  Dermatologic: no appreciable wounds or damage to the skin    VITAL SIGNS:  Vital Signs Last 24 Hrs  T(C): 36.7 (08 Jul 2024 06:12), Max: 37.1 (07 Jul 2024 13:24)  T(F): 98 (08 Jul 2024 06:12), Max: 98.7 (07 Jul 2024 13:24)  HR: 93 (08 Jul 2024 09:48) (80 - 105)  BP: 124/68 (08 Jul 2024 09:48) (93/53 - 148/69)  BP(mean): 67 (07 Jul 2024 20:30) (67 - 67)  RR: 18 (08 Jul 2024 06:12) (17 - 18)  SpO2: 97% (08 Jul 2024 09:48) (95% - 97%)    Parameters below as of 08 Jul 2024 09:48  Patient On (Oxygen Delivery Method): room air          MEDICATIONS:  MEDICATIONS  (STANDING):  enoxaparin Injectable 40 milliGRAM(s) SubCutaneous every 24 hours  lamoTRIgine 100 milliGRAM(s) Oral every 12 hours  melatonin 3 milliGRAM(s) Oral at bedtime  multivitamin 1 Tablet(s) Oral daily    MEDICATIONS  (PRN):  acetaminophen     Tablet .. 650 milliGRAM(s) Oral every 6 hours PRN Temp greater or equal to 38C (100.4F), Mild Pain (1 - 3)  ketorolac   Injectable 15 milliGRAM(s) IV Push every 6 hours PRN Moderate Pain (4 - 6)  lidocaine   4% Patch 1 Patch Transdermal daily PRN pain  morphine  - Injectable 2 milliGRAM(s) IV Push every 4 hours PRN Severe Pain (7 - 10)      ALLERGIES:  Allergies    No Known Allergies    Intolerances        LABS:                        11.8   8.76  )-----------( 258      ( 08 Jul 2024 06:55 )             36.8     07-08    137  |  105  |  15  ----------------------------<  96  3.6   |  23  |  1.06    Ca    8.8      08 Jul 2024 06:55  Phos  3.8     07-08  Mg     1.9     07-08        Urinalysis Basic - ( 08 Jul 2024 06:55 )    Color: x / Appearance: x / SG: x / pH: x  Gluc: 96 mg/dL / Ketone: x  / Bili: x / Urobili: x   Blood: x / Protein: x / Nitrite: x   Leuk Esterase: x / RBC: x / WBC x   Sq Epi: x / Non Sq Epi: x / Bacteria: x      CAPILLARY BLOOD GLUCOSE      POCT Blood Glucose.: 121 mg/dL (08 Jul 2024 00:36)      RADIOLOGY & ADDITIONAL TESTS: Reviewed. OVERNIGHT EVENTS: at 1 am, pt c/o HA rated 10/10 that he described as new. Covering MD noticed L sided facial droop. Stroke code was called. CT head was negative. Pt given toradol, ofirmev, dilaudid 0.5mg with no improvement in HA. Flexiril 10mg, morphine 2mg IV, lidocaine patch subsequently given. MR faulkner pending at this time.    SUBJECTIVE / INTERVAL HPI: Patient seen and examined at bedside. Pt resting comfortably in bed. States he still has a HA, describes pain as dull, 10/10, located behind the eyes. Denies sensitivity to light or sounds. Pt endorses that he has had similar HAs on and off for the past month that rarely improve. Reports dizziness as well. States he has had tingling in his feet on and off for the past few months, which he attributes to his spinal injuries/chronic back pain. Did not sleep last night. Denies fevers, chills, chest pain, SOB, abdominal pain, n/v/d. ROS otherwise negative.    Per pt, he takes morphine po 200mg in AM, 230mg in afternoon, and 100mg QHS. Per [facility], last RX in February. Pt states he has not received morphine for 2 months.      PHYSICAL EXAM:    General: NAD, frail, heavily tattooed  HEENT: NC/AT; PERRL, anicteric sclera; MMM  Neck: supple  Cardiovascular: +S1/S2, RRR  Respiratory: CTA B/L; no W/R/R  Gastrointestinal: soft, NT/ND; +BSx4  Extremities: WWP; no edema, clubbing or cyanosis  Vascular: 2+ radial, DP/PT pulses B/L  Neurological: AAOx3; no focal deficits, strength 5/5 in b/l UE and b/l LE, sensation grossly intact  Psychiatric: pleasant mood and affect  Dermatologic: no appreciable wounds or damage to the skin    VITAL SIGNS:  Vital Signs Last 24 Hrs  T(C): 36.7 (08 Jul 2024 06:12), Max: 37.1 (07 Jul 2024 13:24)  T(F): 98 (08 Jul 2024 06:12), Max: 98.7 (07 Jul 2024 13:24)  HR: 93 (08 Jul 2024 09:48) (80 - 105)  BP: 124/68 (08 Jul 2024 09:48) (93/53 - 148/69)  BP(mean): 67 (07 Jul 2024 20:30) (67 - 67)  RR: 18 (08 Jul 2024 06:12) (17 - 18)  SpO2: 97% (08 Jul 2024 09:48) (95% - 97%)    Parameters below as of 08 Jul 2024 09:48  Patient On (Oxygen Delivery Method): room air          MEDICATIONS:  MEDICATIONS  (STANDING):  enoxaparin Injectable 40 milliGRAM(s) SubCutaneous every 24 hours  lamoTRIgine 100 milliGRAM(s) Oral every 12 hours  melatonin 3 milliGRAM(s) Oral at bedtime  multivitamin 1 Tablet(s) Oral daily    MEDICATIONS  (PRN):  acetaminophen     Tablet .. 650 milliGRAM(s) Oral every 6 hours PRN Temp greater or equal to 38C (100.4F), Mild Pain (1 - 3)  ketorolac   Injectable 15 milliGRAM(s) IV Push every 6 hours PRN Moderate Pain (4 - 6)  lidocaine   4% Patch 1 Patch Transdermal daily PRN pain  morphine  - Injectable 2 milliGRAM(s) IV Push every 4 hours PRN Severe Pain (7 - 10)      ALLERGIES:  Allergies    No Known Allergies    Intolerances        LABS:                        11.8   8.76  )-----------( 258      ( 08 Jul 2024 06:55 )             36.8     07-08    137  |  105  |  15  ----------------------------<  96  3.6   |  23  |  1.06    Ca    8.8      08 Jul 2024 06:55  Phos  3.8     07-08  Mg     1.9     07-08        Urinalysis Basic - ( 08 Jul 2024 06:55 )    Color: x / Appearance: x / SG: x / pH: x  Gluc: 96 mg/dL / Ketone: x  / Bili: x / Urobili: x   Blood: x / Protein: x / Nitrite: x   Leuk Esterase: x / RBC: x / WBC x   Sq Epi: x / Non Sq Epi: x / Bacteria: x      CAPILLARY BLOOD GLUCOSE      POCT Blood Glucose.: 121 mg/dL (08 Jul 2024 00:36)      RADIOLOGY & ADDITIONAL TESTS: Reviewed. OVERNIGHT EVENTS: at 1 am, pt c/o HA rated 10/10 that he described as new. Covering MD noticed L sided facial droop. Stroke code was called. CT head was negative. Pt given toradol, ofirmev, dilaudid 0.5mg with no improvement in HA. Flexiril 10mg, morphine 2mg IV, lidocaine patch subsequently given. MR faulkner pending at this time.    SUBJECTIVE / INTERVAL HPI: Patient seen and examined at bedside. Pt resting comfortably in bed. States he still has a HA, describes pain as dull, 10/10, located behind the eyes. Denies sensitivity to light or sounds. Pt endorses that he has had similar HAs on and off for the past month that rarely improve. Reports dizziness as well. States he has had tingling in his feet on and off for the past few months, which he attributes to his spinal injuries/chronic back pain. Did not sleep last night. Endorses good appetite. Denies fevers, chills, chest pain, SOB, abdominal pain, n/v/d. ROS otherwise negative.    Per pt, he takes morphine po 200mg in AM, 230mg in afternoon, and 100mg QHS. Per [facility], last RX in February. Pt states he has not received morphine for 2 months.      PHYSICAL EXAM:    General: NAD, frail, heavily tattooed  HEENT: NC/AT; PERRL, anicteric sclera; MMM  Neck: supple  Cardiovascular: +S1/S2, RRR  Respiratory: CTA B/L; no W/R/R  Gastrointestinal: soft, NT/ND; +BSx4  Extremities: WWP; no edema, clubbing or cyanosis  Vascular: 2+ radial, DP/PT pulses B/L  Neurological: AAOx3; no focal deficits, strength 5/5 in b/l UE and b/l LE, sensation grossly intact  Psychiatric: pleasant mood and affect  Dermatologic: no appreciable wounds or damage to the skin    VITAL SIGNS:  Vital Signs Last 24 Hrs  T(C): 36.7 (08 Jul 2024 06:12), Max: 37.1 (07 Jul 2024 13:24)  T(F): 98 (08 Jul 2024 06:12), Max: 98.7 (07 Jul 2024 13:24)  HR: 93 (08 Jul 2024 09:48) (80 - 105)  BP: 124/68 (08 Jul 2024 09:48) (93/53 - 148/69)  BP(mean): 67 (07 Jul 2024 20:30) (67 - 67)  RR: 18 (08 Jul 2024 06:12) (17 - 18)  SpO2: 97% (08 Jul 2024 09:48) (95% - 97%)    Parameters below as of 08 Jul 2024 09:48  Patient On (Oxygen Delivery Method): room air          MEDICATIONS:  MEDICATIONS  (STANDING):  enoxaparin Injectable 40 milliGRAM(s) SubCutaneous every 24 hours  lamoTRIgine 100 milliGRAM(s) Oral every 12 hours  melatonin 3 milliGRAM(s) Oral at bedtime  multivitamin 1 Tablet(s) Oral daily    MEDICATIONS  (PRN):  acetaminophen     Tablet .. 650 milliGRAM(s) Oral every 6 hours PRN Temp greater or equal to 38C (100.4F), Mild Pain (1 - 3)  ketorolac   Injectable 15 milliGRAM(s) IV Push every 6 hours PRN Moderate Pain (4 - 6)  lidocaine   4% Patch 1 Patch Transdermal daily PRN pain  morphine  - Injectable 2 milliGRAM(s) IV Push every 4 hours PRN Severe Pain (7 - 10)      ALLERGIES:  Allergies    No Known Allergies    Intolerances        LABS:                        11.8   8.76  )-----------( 258      ( 08 Jul 2024 06:55 )             36.8     07-08    137  |  105  |  15  ----------------------------<  96  3.6   |  23  |  1.06    Ca    8.8      08 Jul 2024 06:55  Phos  3.8     07-08  Mg     1.9     07-08        Urinalysis Basic - ( 08 Jul 2024 06:55 )    Color: x / Appearance: x / SG: x / pH: x  Gluc: 96 mg/dL / Ketone: x  / Bili: x / Urobili: x   Blood: x / Protein: x / Nitrite: x   Leuk Esterase: x / RBC: x / WBC x   Sq Epi: x / Non Sq Epi: x / Bacteria: x      CAPILLARY BLOOD GLUCOSE      POCT Blood Glucose.: 121 mg/dL (08 Jul 2024 00:36)      RADIOLOGY & ADDITIONAL TESTS: Reviewed. OVERNIGHT EVENTS: at 1 am, pt c/o HA rated 10/10 that he described as new. Covering MD noticed L sided facial droop. Stroke code was called. CT head was negative. Pt given toradol, ofirmev, dilaudid 0.5mg with no improvement in HA. Flexiril 10mg, morphine 2mg IV, lidocaine patch subsequently given. MR faulkner pending at this time.    SUBJECTIVE / INTERVAL HPI: Patient seen and examined at bedside. Pt resting comfortably in bed. States he still has a HA, describes pain as dull, 10/10, located behind the eyes. Denies sensitivity to light or sounds. Pt endorses that he has had similar HAs on and off for the past month that rarely improve. Reports dizziness as well. States he has had tingling in his feet on and off for the past few months, which he attributes to his spinal injuries/chronic back pain. Did not sleep last night. Endorses good appetite. Denies fevers, chills, chest pain, SOB, abdominal pain, n/v/d. ROS otherwise negative.    Per pt, he takes morphine po 200mg in AM, 230mg in afternoon, and 100mg QHS. Per [facility], last RX in February. Pt states he has not received morphine since March.      PHYSICAL EXAM:    General: NAD, frail, heavily tattooed  HEENT: NC/AT; PERRL, anicteric sclera; MMM  Neck: supple  Cardiovascular: +S1/S2, RRR  Respiratory: CTA B/L; no W/R/R  Gastrointestinal: soft, NT/ND; +BSx4  Extremities: WWP; no edema, clubbing or cyanosis  Vascular: 2+ radial, DP/PT pulses B/L  Neurological: AAOx3; no focal deficits, strength 5/5 in b/l UE and b/l LE, sensation grossly intact  Psychiatric: pleasant mood and affect  Dermatologic: no appreciable wounds or damage to the skin    VITAL SIGNS:  Vital Signs Last 24 Hrs  T(C): 36.7 (08 Jul 2024 06:12), Max: 37.1 (07 Jul 2024 13:24)  T(F): 98 (08 Jul 2024 06:12), Max: 98.7 (07 Jul 2024 13:24)  HR: 93 (08 Jul 2024 09:48) (80 - 105)  BP: 124/68 (08 Jul 2024 09:48) (93/53 - 148/69)  BP(mean): 67 (07 Jul 2024 20:30) (67 - 67)  RR: 18 (08 Jul 2024 06:12) (17 - 18)  SpO2: 97% (08 Jul 2024 09:48) (95% - 97%)    Parameters below as of 08 Jul 2024 09:48  Patient On (Oxygen Delivery Method): room air          MEDICATIONS:  MEDICATIONS  (STANDING):  enoxaparin Injectable 40 milliGRAM(s) SubCutaneous every 24 hours  lamoTRIgine 100 milliGRAM(s) Oral every 12 hours  melatonin 3 milliGRAM(s) Oral at bedtime  multivitamin 1 Tablet(s) Oral daily    MEDICATIONS  (PRN):  acetaminophen     Tablet .. 650 milliGRAM(s) Oral every 6 hours PRN Temp greater or equal to 38C (100.4F), Mild Pain (1 - 3)  ketorolac   Injectable 15 milliGRAM(s) IV Push every 6 hours PRN Moderate Pain (4 - 6)  lidocaine   4% Patch 1 Patch Transdermal daily PRN pain  morphine  - Injectable 2 milliGRAM(s) IV Push every 4 hours PRN Severe Pain (7 - 10)      ALLERGIES:  Allergies    No Known Allergies    Intolerances        LABS:                        11.8   8.76  )-----------( 258      ( 08 Jul 2024 06:55 )             36.8     07-08    137  |  105  |  15  ----------------------------<  96  3.6   |  23  |  1.06    Ca    8.8      08 Jul 2024 06:55  Phos  3.8     07-08  Mg     1.9     07-08        Urinalysis Basic - ( 08 Jul 2024 06:55 )    Color: x / Appearance: x / SG: x / pH: x  Gluc: 96 mg/dL / Ketone: x  / Bili: x / Urobili: x   Blood: x / Protein: x / Nitrite: x   Leuk Esterase: x / RBC: x / WBC x   Sq Epi: x / Non Sq Epi: x / Bacteria: x      CAPILLARY BLOOD GLUCOSE      POCT Blood Glucose.: 121 mg/dL (08 Jul 2024 00:36)      RADIOLOGY & ADDITIONAL TESTS: Reviewed.

## 2024-07-08 NOTE — CONSULT NOTE ADULT - SUBJECTIVE AND OBJECTIVE BOX
Patient is a 73y old  Male who presents with a chief complaint of inability to care for self (08 Jul 2024 07:25)      HPI:  Patient is a 74 y/o M with PMH of seizures on lamotrigine, osteoporosis on alendronate  presents to ED with weakness and inability to care for self at home. Pt states he was recently admitted to [facility] for one week and discharged on 7/3/24. Patient was found to have enterocolitis and given flagyl and ceftriaxone. Patient reported going to [facility] on 7/4 due to worsening symptoms. Patient was discharged same day on levaquin and cefpodoxime for concerns of PNA and took a few doses.  Pt since then has had difficult time at home caring for self including getting food.  Pt reported having difficult time walking secondary to generalized weakness.  Patient reported his symptoms initially started about  2-3 months ago and has been getting worse since. He's had watery diarrhea for over a week and  reported last bowel movement to be on 7/5, has not has one since then due to having poor po intake. Patient reported having a subjective fever, body aches, malaise, headaches, and generalized weakness. with difficulty standing and walking. He denied N,V, abdominal pain, or melena. He reported having urinary hesitancy but no dysuria or hematuria.    Per chart review, patient was admitted to [facility] on 6/26/24 with watery diarrhea and abdominal pain, found to be tachycardic with leukocytosis (WBC 20.4, neutrophil predominant) 2/2 sepsis i/s/o enterocolitis. Treated with CTX and flagyl with resolution of diarrhea, stool studies never sent.     In the ED:  Vital Signs: T 98.7 F, , /72, RR 17, SpO2 96% on room air  Labs: within normal limits  EKG: NSR.   CXR: RLL field consolidation  Interventions: 1L NS bolus, CTX 1g, azithromycin 500mg  Consults: PT (07 Jul 2024 15:57)    PAST MEDICAL & SURGICAL HISTORY:  Seizures      Osteoporosis      History of spinal surgery        MEDICATIONS  (STANDING):  cyclobenzaprine 10 milliGRAM(s) Oral once  enoxaparin Injectable 40 milliGRAM(s) SubCutaneous every 24 hours  lamoTRIgine 100 milliGRAM(s) Oral every 12 hours    MEDICATIONS  (PRN):  acetaminophen     Tablet .. 650 milliGRAM(s) Oral every 6 hours PRN Temp greater or equal to 38C (100.4F), Mild Pain (1 - 3)  lidocaine   4% Patch 1 Patch Transdermal daily PRN pain  morphine  - Injectable 2 milliGRAM(s) IV Push every 4 hours PRN Severe Pain (7 - 10)      FAMILY HISTORY:  FH: lymphoma (Sibling)        CBC Full  -  ( 08 Jul 2024 06:55 )  WBC Count : 8.76 K/uL  RBC Count : 3.76 M/uL  Hemoglobin : 11.8 g/dL  Hematocrit : 36.8 %  Platelet Count - Automated : 258 K/uL  Mean Cell Volume : 97.9 fl  Mean Cell Hemoglobin : 31.4 pg  Mean Cell Hemoglobin Concentration : 32.1 gm/dL  Auto Neutrophil # : x  Auto Lymphocyte # : x  Auto Monocyte # : x  Auto Eosinophil # : x  Auto Basophil # : x  Auto Neutrophil % : x  Auto Lymphocyte % : x  Auto Monocyte % : x  Auto Eosinophil % : x  Auto Basophil % : x      07-08    137  |  105  |  15  ----------------------------<  96  3.6   |  23  |  1.06    Ca    8.8      08 Jul 2024 06:55  Phos  3.8     07-08  Mg     1.9     07-08        Urinalysis Basic - ( 08 Jul 2024 06:55 )    Color: x / Appearance: x / SG: x / pH: x  Gluc: 96 mg/dL / Ketone: x  / Bili: x / Urobili: x   Blood: x / Protein: x / Nitrite: x   Leuk Esterase: x / RBC: x / WBC x   Sq Epi: x / Non Sq Epi: x / Bacteria: x        Radiology :     < from: CT Brain Stroke Protocol (07.08.24 @ 01:10) >  ACC: [id number] EXAM:  CT BRAIN STROKE PROTOCOL   ORDERED BY: CHUCK MCKENZIE     PROCEDURE DATE:  07/08/2024          INTERPRETATION:  CLINICAL INFORMATION: Code Stroke transient left facial   droop. Headache.    COMPARISON: None.    CONTRAST:  IV Contrast: None  Complications: None reported at the time of study completion    TECHNIQUE:  Serial axial images were obtained from the skull base to the   vertex using multi-slice helical technique. Sagittal and coronal   reformats were obtained.    FINDINGS:    VENTRICLES AND SULCI: Age appropriate involutional changes.  INTRA-AXIAL: No intraparenchymal mass, acute hemorrhage, or midline shift.  EXTRA-AXIAL: No mass or fluid collection. Basal cisterns are normal in   appearance.    VISUALIZED SINUSES:  Clear.  TYMPANOMASTOID CAVITIES:  Clear.  VISUALIZED ORBITS: Normal.  CALVARIUM: Intact.    MISCELLANEOUS: None.      IMPRESSION:  No acute intracranial hemorrhage or demarcated infarct.  Mild generalized volume loss.    < from: CT Angio Brain Stroke Protocol  w/ IV Cont (07.08.24 @ 01:09) >    ACC: [id number] EXAM:  CT ANGIO NECK STROKE PROTCL IC   ORDERED BY: CHUCK MCKENZIE     ACC: [id number] EXAM:  CT ANGIO BRAIN STROKE PROTC IC   ORDERED BY: CHUCK MCKENZIE     ACC: [id number] EXAM:  CT BRAIN PERFUSION MAPS STROKE   ORDERED BY: CHUCK MCKENZIE     PROCEDURE DATE:  07/08/2024          INTERPRETATION:  CLINICAL INFORMATION: Code Stroke. Transient left facial   droop. Headache.    COMPARISON: None.    CONTRAST:  IV Contrast: Isovue 370  40 cc administered (accession [id number]), 40 cc   administered (accession [id number]), 80 cc administered (accession   [id number])  60 cc discarded (accession [id number]), 60 cc discarded   (accession [id number]), 20 cc discarded (accession [id number])  Complications: None reported at time of study completion    TECHNIQUE: CT perfusion and CTA of the head and neck were performed   following the intravenous administration of IV contrast. MIP   reconstructions were performed on a separate workstation and reviewed.   RAPID software was utilized for perfusion analysis.    FINDINGS:    CT PERFUSION:    TECHNICAL LIMITATIONS: None.    CBF<30%/CORE INFARCTION: 0 mL  TMAX>6s/UNDERPERFUSED TISSUE: 0 mL  MISMATCH VOLUME/TISSUE AT RISK: 0 mL  MISMATCH RATIO: None.    MISCELLANEOUS: None.      CTA NECK:    AORTIC ARCH AND VISUALIZED GREAT VESSELS: Within normal limits.    RIGHT:  COMMON CAROTID ARTERY: No significant stenosis to the carotid bifurcation.  INTERNAL CAROTID ARTERY: No significant stenosis based on NASCET criteria.  VERTEBRAL ARTERY: Normal in course and caliber to the intracranial   circulation.    LEFT:  COMMON CAROTID ARTERY: No significant stenosis to the carotid   bifurcation. Small calcified plaque at the bifurcation.  INTERNAL CAROTID ARTERY: No significant stenosis based on NASCET criteria.  VERTEBRAL ARTERY: Normal in course and caliber to the intracranial   circulation.    VISUALIZED LUNGS: Severe emphysematous changes with bilateral apical   blebs/bullae.    MISCELLANEOUS: Severe degenerative changes of the cervical spine.    CAROTIDSTENOSIS REFERENCE: Percent (%) stenosis is expressed in terms of   NASCET Criteria. (NASCET = 100x1-(N/D)). N=greatest narrowing. D=normal   distal diameter - MILD = <50% stenosis. - MODERATE = 50-69% stenosis. -   SEVERE = 70-89% stenosis. - HAIRLINE/CRITICAL = 90-99% stenosis. -   OCCLUDED = 100% stenosis.      CTA HEAD:    INTERNAL CAROTID ARTERIES: Bilateral petrous and precavernous are patent   without significant stenosis. Bilateral cavernous and supraclinoid   regions contain calcified plaque without significant stenosis.    [place] OF DASH: No aneurysm identified. Tiny aneurysms can be beyond   the resolution of CTA technique.    ANTERIOR CEREBRAL ARTERIES: No significant stenosis or occlusion.  MIDDLE CEREBRAL ARTERIES: No significant stenosis or occlusion.  POSTERIOR CEREBRAL ARTERIES: No significant stenosis or occlusion.   Hypoplastic right P1 segment. Hypoplastic/absent left P-comm. Right PCA   territory supplied by right posterior communicating artery.    DISTAL VERTEBRAL / BASILAR ARTERIES: No significant stenosis or occlusion.    VENOUS STRUCTURES: The superior sagittal sinus, internal cerebral veins,   straight sinus, right/left transverse and right/left sigmoid sinuses are   patent. The left internal jugular vein is patent. The right internal   jugular vein demonstrates small amount of contrast at the right jugular   bulb. The remaining segments of the right internal jugular vein are not   opacified with contrast. If there is concern for right IJ   stenosis/occlusion consider further assessment with Doppler sonogram.    MISCELLANEOUS: No other vascular abnormality is seen. Paraseptal and   centrilobular emphysematous changes involving lung apices.      IMPRESSION:    CT PERFUSION:  Technical limitations: None.    Core infarction: 0 ml  Penumbra / tissue at risk for active ischemia: 0 ml    CTA NECK:  No evidence of significant stenosis or occlusion.    CTA HEAD:  Patent intracranial circulation without flow limiting stenosis.    No evidence of aneurysm.Tiny aneurysms can be beyond the resolution of   CTA technique.    No dural venous sinus thrombosis.    Loss of contrast opacification involving the right internal jugular vein   after the jugular bulb, (series 10 image [id number]). This can be related to   a hypoplastic right internal jugular vein however if there is clinical   concern for right IJ stenosis/occlusion this can be further assessed with   Doppler sonogram.    < end of copied text >          Review of Systems : per HPI         Vital Signs Last 24 Hrs  T(C): 36.7 (08 Jul 2024 06:12), Max: 37.1 (07 Jul 2024 13:24)  T(F): 98 (08 Jul 2024 06:12), Max: 98.7 (07 Jul 2024 13:24)  HR: 80 (08 Jul 2024 06:12) (80 - 105)  BP: 100/63 (08 Jul 2024 06:12) (93/53 - 111/72)  BP(mean): 67 (07 Jul 2024 20:30) (67 - 67)  RR: 18 (08 Jul 2024 06:12) (17 - 18)  SpO2: 96% (08 Jul 2024 06:12) (95% - 96%)    Parameters below as of 08 Jul 2024 06:12  Patient On (Oxygen Delivery Method): room air            Physical Exam:  frail 73 y o man lying comfortably in semi Case's position , awake , alert , no acute complaints , feels tired     Head: normocephalic , atraumatic    Eyes: PERRLA , EOMI , no nystagmus , sclera anicteric    ENT / FACE: neg nasal discharge , uvula midline , no oropharyngeal erythema / exudate    Neck: supple , negative JVD , negative carotid bruits , no thyromegaly    Chest: CTA bilaterally , neg wheeze / rhonchi / rales / crackles / egophany    Cardiovascular: regular rate and rhythm , neg murmurs / rubs / gallops    Abdomen: soft , non distended , no tenderness to palpation in all 4 quadrants ,  normal bowel sounds     Extremities: WWP , neg cyanosis /clubbing / edema     Neurologic Exam:     Alert and oriented to person , place , date/year , speech fluent w/o dysarthria     Cranial Nerves:           II:                         pupils equal , round and reactive to light , visual fields intact         III/ IV/VI:             extraocular movements intact , neg nystagmus , neg ptosis        V:                        facial sensation intact , V1-3 normal        VII:                      face symmetric , no droop , normal eye closure and smile        VIII:                     hearing intact to finger rub bilaterally        IX and X:             no hoarseness , gag intact , palate/ uvula rise symmetrically        XI:                       SCM / trapezius strength intact bilateral        XII:                      no tongue deviation    Motor Exam:        > 3+/5 x 4 extremities , without drift       Sensation:         intact to light touch x 4 extremities                            no neglect or extinction on double simultaneous testing    DTR:           biceps/brachioradialis: equal                            patella/ankle: equal          neg Babinski          Coordination:            Finger to Nose:  neg dysmetria bilaterally        Gait:  not tested         PM&R Impression: admitted for inability to care for himself , generalized weakness      - no acute path on CT brain imaging     - deconditioned       Recommendations / Plan:       1) Physical / Occupational therapy focusing on therapeutic exercises , equipment evaluation , bed mobility/transfer out of bed evaluation , progressive ambulation with assistive devices prn .    2) Current disposition plan recommendation:    pending functional progress , probable subacute rehab placement

## 2024-07-08 NOTE — CONSULT NOTE ADULT - SUBJECTIVE AND OBJECTIVE BOX
**STROKE CODE CONSULT NOTE**    Last known well time/Time of onset of symptoms:    HPI: 73y Male with PMHx of     T(C): 36.9 (07-07-24 @ 20:30), Max: 37.1 (07-07-24 @ 13:24)  HR: 90 (07-07-24 @ 20:30) (85 - 105)  BP: 93/53 (07-07-24 @ 20:30) (93/53 - 111/72)  RR: 17 (07-07-24 @ 20:30) (17 - 17)  SpO2: 95% (07-07-24 @ 20:30) (95% - 96%)    PAST MEDICAL & SURGICAL HISTORY:  Seizures      Osteoporosis      History of spinal surgery          FAMILY HISTORY:  FH: lymphoma (Sibling)        SOCIAL HISTORY:   Patient lives with *** at ***.   Smoking status:  Drinking:  Drug Use:     ROS: ***  Constitutional: No fever, weight loss or fatigue  Eyes: No eye pain, visual disturbances, or discharge  ENMT:  No difficulty hearing, tinnitus; No sinus or throat pain  Neck: No pain or stiffness  Respiratory: No cough, wheezing, chills or hemoptysis  Cardiovascular: No chest pain, palpitations, shortness of breath, or leg swelling  Gastrointestinal: No abdominal pain. No nausea, vomiting or hematemesis; No diarrhea or constipation. Nohematochezia.  Genitourinary: No dysuria, frequency, hematuria or incontinence  Neurological: As per HPI  Skin: No itching, burning, rashes or lesions   Endocrine: No heat or cold intolerance; No hair loss  Musculoskeletal: No joint pain or swelling; No muscle, back or extremity pain  Heme/Lymph: No easy bruising or bleeding gums    MEDICATIONS  (STANDING):  enoxaparin Injectable 40 milliGRAM(s) SubCutaneous every 24 hours  lamoTRIgine 100 milliGRAM(s) Oral every 12 hours    MEDICATIONS  (PRN):  acetaminophen     Tablet .. 650 milliGRAM(s) Oral every 6 hours PRN Temp greater or equal to 38C (100.4F), Mild Pain (1 - 3)  ketorolac   Injectable 15 milliGRAM(s) IV Push every 6 hours PRN Severe Pain (7 - 10)    Allergies    No Known Allergies    Intolerances      Vital Signs Last 24 Hrs  T(C): 36.9 (07 Jul 2024 20:30), Max: 37.1 (07 Jul 2024 13:24)  T(F): 98.5 (07 Jul 2024 20:30), Max: 98.7 (07 Jul 2024 13:24)  HR: 90 (07 Jul 2024 20:30) (85 - 105)  BP: 93/53 (07 Jul 2024 20:30) (93/53 - 111/72)  BP(mean): 67 (07 Jul 2024 20:30) (67 - 67)  RR: 17 (07 Jul 2024 20:30) (17 - 17)  SpO2: 95% (07 Jul 2024 20:30) (95% - 96%)    Parameters below as of 07 Jul 2024 20:30  Patient On (Oxygen Delivery Method): room air        Physical exam:  Constitutional: No acute distress, conversant  Eyes: Anicteric sclerae, moist conjunctivae, see below for CNs  Neck: trachea midline, FROM, supple, no thyromegaly or lymphadenopathy  Cardiovascular: Regular rate and rhythm, no murmurs, rubs, or gallops. No carotid bruits.   Pulmonary: Anterior breath sounds clear bilaterally, no crackles or wheezing. No use of accessory muscles  GI: Abdomen soft, non-distended, non-tender  Extremities: Radial and DP pulses +2, no edema    Neurologic:  -Mental status: Awake, alert, oriented to person, place, and time. Speech is fluent with intact naming, repetition, and comprehension, no dysarthria. Recent and remote memory intact. Follows commands. Attention/concentration intact. Fund of knowledge appropriate.  -Cranial nerves:   II: Visual fields are full to confrontation.  III, IV, VI: Extraocular movements are intact without nystagmus. Pupils equally round and reactive to light  V:  Facial sensation V1-V3 equal and intact   VII: Face is symmetric with normal eye closure and smile  VIII: Hearing is bilaterally intact to finger rub  IX, X: Uvula is midline and soft palate rises symmetrically  XI: Head turning and shoulder shrug are intact.  XII: Tongue protrudes midline  Motor: Normal bulk and tone. No pronator drift. Strength bilateral upper extremity 5/5, bilateral lower extremities 5/5.  Rapid alternating movements intact and symmetric  Sensation: Intact to light touch bilaterally. No neglect or extinction on double simultaneous testing.  Coordination: No dysmetria on finger-to-nose and heel-to-shin bilaterally  Reflexes: Downgoing toes bilaterally   Gait: Narrow gait and steady    NIHSS: **** ASPECT Score: ***** ICH Score: ****** (GCS)    Fingerstick Blood Glucose: CAPILLARY BLOOD GLUCOSE      POCT Blood Glucose.: 121 mg/dL (08 Jul 2024 00:36)    LABS:                        13.4   9.54  )-----------( 292      ( 07 Jul 2024 13:43 )             40.3     07-07    142  |  105  |  9   ----------------------------<  116<H>  3.7   |  24  |  0.73    Ca    9.4      07 Jul 2024 13:43            Urinalysis Basic - ( 07 Jul 2024 13:43 )    Color: x / Appearance: x / SG: x / pH: x  Gluc: 116 mg/dL / Ketone: x  / Bili: x / Urobili: x   Blood: x / Protein: x / Nitrite: x   Leuk Esterase: x / RBC: x / WBC x   Sq Epi: x / Non Sq Epi: x / Bacteria: x        RADIOLOGY & ADDITIONAL STUDIES:      -----------------------------------------------------------------------------------------------------------------  IV-tPA (Y/N):    ***                              Bolus time:    Alteplase Dose Verification w/ RN:  Reason IV-tPA not given: ***    **STROKE CODE CONSULT NOTE**    Last known well time/Time of onset of symptoms: 20 minutes prior to time of stroke code    HPI: 73y Male with PMHx of seizure disorder on Lamotrigine (stated last seizure was in the 1970s, doesn't remember what his seizures consisted of), cervical spinal stenosis with chronic back pain on PO Morphine, OA, osteoporosis, BPH, glaucoma, former smoker/EtOH use, L intertrochanteric fx with c/c/b provoked LLE DVT s/p 3 months of Eliquis, recent hospitalization at [facility] for enterocolitis (discharged 7/4), who was admitted to [facility] for failure to thrive and possible RLL PNA. Recently had went to the [facility] on 7/5 for worsening of his enterocolitis symptoms, found to have possible PNA at that time. Was subsequently discharged with PO antibiotics however only took a few doses. Presented to [facility] today for failure to thrive, has been having difficulty walking 2/2 to generalized weakness and decreased PO intake. In the ED was found to have possible RLL PNA on CXR s/p dose of CTX and Zithromax. Was admitted to [facility] for further management. Patient reports that over the last 2-3 months he has had progressively worsening dizziness described as an unsteady gait, exacerbated with going from a sitting to standing position. Walks with a cane due to his back pain however has been having to hold onto a wall or object with his other hand in order to maintain his balance. Has also been having progressively worsening headaches over the same period of time. States that the headaches are constant and expand across the entirety of his forehead, along his temples and underneath his eyes. Initially thought they were sinus headaches, has no history of migraines in the past. Denies any associated photo/phonophobia, eye pain, double or blurry vision, N/V.     Stroke code was called for transient L facial weakness and headache not relieved by NSAIDs. Was complaining of a headache, given a dose of Tylenol at 10 PM on 7/7 and IV Toradol at 1 AM on 7/8 without relief of his symptoms. Endorsed to primary team that the headache he was currently experiencing was different then the ones he had been having over the last 2-3 months. Per primary team, about 20 minutes prior to the stroke code being called patient was noted to have L facial weakness on neurologic assessment. Was having difficulty puffing out his cheek on the L side despite repeated attempts. At time of my neurologic assessment, patient without L facial weakness (no asymmetry with forehead wrinkling, no weakness with forced eyelid opening, no facial asymmetry, able to provide resistance against provider with puffing of cheeks). Was however noted to have an NIHSS of 2 for R sided ataxia. After CT scans were completed and patient was brought back to his room, repeat neurologic exam performed. No longer with RUE dysmetria however still with RLE ataxia on HTS, Patient ambulated with a cane in one hand, holding onto the wall with another which he states has been his baseline gait over the last several months, did not feel like it was worse at time of assessment. Noted to be narrow, did not appear to be leaning to one particular side.     T(C): 36.9 (07-07-24 @ 20:30), Max: 37.1 (07-07-24 @ 13:24)  HR: 90 (07-07-24 @ 20:30) (85 - 105)  BP: 93/53 (07-07-24 @ 20:30) (93/53 - 111/72)  RR: 17 (07-07-24 @ 20:30) (17 - 17)  SpO2: 95% (07-07-24 @ 20:30) (95% - 96%)    PAST MEDICAL & SURGICAL HISTORY:  Seizures      Osteoporosis      History of spinal surgery          FAMILY HISTORY:  FH: lymphoma (Sibling)        SOCIAL HISTORY:   Lives in an SRO    ROS:   Constitutional: No fever, weight loss or fatigue  Eyes: No discharge  ENMT:  No difficulty hearing, tinnitus; No sinus or throat pain  Neck: No pain or stiffness  Respiratory: No cough, wheezing, chills or hemoptysis  Cardiovascular: No chest pain, palpitations, shortness of breath, or leg swelling  Gastrointestinal: No abdominal pain. No nausea, vomiting or hematemesis; No diarrhea or constipation. Nohematochezia.  Genitourinary: No dysuria, frequency, hematuria or incontinence  Neurological: As per HPI  Skin: No itching, burning, rashes or lesions   Endocrine: No heat or cold intolerance; No hair loss  Musculoskeletal: No joint pain or swelling; No muscle, back or extremity pain  Heme/Lymph: No easy bruising or bleeding gums    MEDICATIONS  (STANDING):  enoxaparin Injectable 40 milliGRAM(s) SubCutaneous every 24 hours  lamoTRIgine 100 milliGRAM(s) Oral every 12 hours    MEDICATIONS  (PRN):  acetaminophen     Tablet .. 650 milliGRAM(s) Oral every 6 hours PRN Temp greater or equal to 38C (100.4F), Mild Pain (1 - 3)  ketorolac   Injectable 15 milliGRAM(s) IV Push every 6 hours PRN Severe Pain (7 - 10)    Allergies    No Known Allergies    Intolerances      Vital Signs Last 24 Hrs  T(C): 36.9 (07 Jul 2024 20:30), Max: 37.1 (07 Jul 2024 13:24)  T(F): 98.5 (07 Jul 2024 20:30), Max: 98.7 (07 Jul 2024 13:24)  HR: 90 (07 Jul 2024 20:30) (85 - 105)  BP: 93/53 (07 Jul 2024 20:30) (93/53 - 111/72)  BP(mean): 67 (07 Jul 2024 20:30) (67 - 67)  RR: 17 (07 Jul 2024 20:30) (17 - 17)  SpO2: 95% (07 Jul 2024 20:30) (95% - 96%)    Parameters below as of 07 Jul 2024 20:30  Patient On (Oxygen Delivery Method): room air        Physical exam:  Constitutional: Cachetic appearing, in no apparent acute distress  Eyes: Anicteric sclerae, moist conjunctivae, see below for CNs  Extremities: No edema    Neurologic:  -Mental status: Awake, alert, oriented to person, place, and time. Speech is fluent with intact naming and comprehension, no dysarthria. Recent and remote memory intact. Follows commands. Attention/concentration intact. Fund of knowledge appropriate.  -Cranial nerves:   II: Visual fields are full to confrontation.  III, IV, VI: Extraocular movements are intact without nystagmus  V:  Facial sensation V1-V3 equal and intact   VII: No asymmetry with forehead wrinkling. No weakness with forced eyelid opening. No facial asymmetry at rest or with activation. Able to provide resistance against provider with puffing of cheeks b/l  XII: Tongue protrudes midline  Motor: Diminished bulk throughout, normal tone. Strength 5/5 in b/l UEs without drift. Strength at least 4/5 in b/l LEs without drift.  Sensation: Intact to light touch bilaterally. No neglect or extinction on double simultaneous testing.  Coordination: Pass pointing with FTN on the RUE at time of code although improved on reassessment. Ataxic with HTS on the RLE. No ataxia on the LUE or LLE  Gait: Ambulated with cane in one hand, holding onto the wall with the other (pt states is baseline). Narrow gait, did not appear to be leaning towards one side    NIHSS: 2    Fingerstick Blood Glucose: CAPILLARY BLOOD GLUCOSE      POCT Blood Glucose.: 121 mg/dL (08 Jul 2024 00:36)    LABS:                        13.4   9.54  )-----------( 292      ( 07 Jul 2024 13:43 )             40.3     07-07    142  |  105  |  9   ----------------------------<  116<H>  3.7   |  24  |  0.73    Ca    9.4      07 Jul 2024 13:43            Urinalysis Basic - ( 07 Jul 2024 13:43 )    Color: x / Appearance: x / SG: x / pH: x  Gluc: 116 mg/dL / Ketone: x  / Bili: x / Urobili: x   Blood: x / Protein: x / Nitrite: x   Leuk Esterase: x / RBC: x / WBC x   Sq Epi: x / Non Sq Epi: x / Bacteria: x        RADIOLOGY & ADDITIONAL STUDIES:  < from: CT Brain Stroke Protocol (07.08.24 @ 01:10) >  IMPRESSION:  No acute intracranial hemorrhage or demarcated infarct.  Mild generalized volume loss.    < end of copied text >    < from: CT Angio Head and Neck, CT Perfusion Stroke Protocol  w/ IV Cont (07.08.24 @ 01:09) >  CT PERFUSION:  Technical limitations: None.    Core infarction: 0 ml  Penumbra / tissue at risk for active ischemia: 0 ml    CTA NECK:  No evidence of significant stenosis or occlusion.    CTA HEAD:  Patent intracranial circulation without flow limiting stenosis.    No evidence of aneurysm.Tiny aneurysms can be beyond the resolution of   CTA technique.    No dural venous sinus thrombosis.    Loss of contrast opacification involving the right internal jugular vein   after the jugular bulb, (series 10 image [id number]). This can be related to   a hypoplastic right internal jugular vein however if there is clinical   concern for right IJ stenosis/occlusion this can be further assessed with   Doppler sonogram.    < end of copied text >    -----------------------------------------------------------------------------------------------------------------  IV-tPA (Y/N):  N  Reason IV-tPA not given: Dizziness and unsteady gait have been chronic over the last several months, L facial weakness resolved  **STROKE CODE CONSULT NOTE**    Last known well time/Time of onset of symptoms: 20 minutes prior to time of stroke code    HPI: 73y Male with PMHx of seizure disorder on Lamotrigine (stated last seizure was in the 1970s, doesn't remember what his seizures consisted of), cervical spinal stenosis with chronic back pain on PO Morphine, OA, osteoporosis, BPH, glaucoma, former smoker/EtOH use, L intertrochanteric fx with c/c/b provoked LLE DVT s/p 3 months of Eliquis, recent hospitalization at [facility] for enterocolitis (discharged 7/4), who was admitted to [facility] for failure to thrive and possible RLL PNA. Recently had went to the [facility] on 7/5 for worsening of his enterocolitis symptoms, found to have possible PNA at that time. Was subsequently discharged with PO antibiotics however only took a few doses. Presented to [facility] today for failure to thrive, has been having difficulty walking 2/2 to generalized weakness and decreased PO intake. In the ED was found to have possible RLL PNA on CXR s/p dose of CTX and Zithromax. Was admitted to [facility] for further management. Patient reports that over the last 2-3 months he has had progressively worsening dizziness described as an unsteady gait, exacerbated with going from a sitting to standing position. Walks with a cane due to his back pain however has been having to hold onto a wall or object with his other hand in order to maintain his balance. Has also been having progressively worsening headaches over the same period of time. States that the headaches are constant and expand across the entirety of his forehead, along his temples and underneath his eyes. Initially thought they were sinus headaches, has no history of migraines in the past. Denies any associated photo/phonophobia, eye pain, double or blurry vision, N/V.     Stroke code was called for transient L facial weakness and headache not relieved by NSAIDs. Was complaining of a headache, given a dose of Tylenol at 10 PM on 7/7 and IV Toradol at 1 AM on 7/8 without relief of his symptoms. Endorsed to primary team that the headache he was currently experiencing was different then the ones he had been having over the last 2-3 months. Per primary team, about 20 minutes prior to the stroke code being called patient was noted to have L facial weakness on neurologic assessment. Was having difficulty puffing out his cheek on the L side despite repeated attempts. At time of my neurologic assessment, patient without L facial weakness (no asymmetry with forehead wrinkling, no weakness with forced eyelid opening, no facial asymmetry, able to provide resistance against provider with puffing of cheeks). Was however noted to have an NIHSS of 2 for R sided ataxia. After CT scans were completed and patient was brought back to his room, repeat neurologic exam performed. No longer with RUE dysmetria however still with RLE ataxia on HTS (NIHSS of 1), Patient ambulated with a cane in one hand, holding onto the wall with another which he states has been his baseline gait over the last several months, did not feel like it was worse at time of assessment. Noted to be narrow, did not appear to be leaning to one particular side.     T(C): 36.9 (07-07-24 @ 20:30), Max: 37.1 (07-07-24 @ 13:24)  HR: 90 (07-07-24 @ 20:30) (85 - 105)  BP: 93/53 (07-07-24 @ 20:30) (93/53 - 111/72)  RR: 17 (07-07-24 @ 20:30) (17 - 17)  SpO2: 95% (07-07-24 @ 20:30) (95% - 96%)    PAST MEDICAL & SURGICAL HISTORY:  Seizures      Osteoporosis      History of spinal surgery          FAMILY HISTORY:  FH: lymphoma (Sibling)        SOCIAL HISTORY:   Lives in an SRO    ROS:   Constitutional: No fever, weight loss or fatigue  Eyes: No discharge  ENMT:  No difficulty hearing, tinnitus; No sinus or throat pain  Neck: No pain or stiffness  Respiratory: No cough, wheezing, chills or hemoptysis  Cardiovascular: No chest pain, palpitations, shortness of breath, or leg swelling  Gastrointestinal: No abdominal pain. No nausea, vomiting or hematemesis; No diarrhea or constipation. Nohematochezia.  Genitourinary: No dysuria, frequency, hematuria or incontinence  Neurological: As per HPI  Skin: No itching, burning, rashes or lesions   Endocrine: No heat or cold intolerance; No hair loss  Musculoskeletal: No joint pain or swelling; No muscle, back or extremity pain  Heme/Lymph: No easy bruising or bleeding gums    MEDICATIONS  (STANDING):  enoxaparin Injectable 40 milliGRAM(s) SubCutaneous every 24 hours  lamoTRIgine 100 milliGRAM(s) Oral every 12 hours    MEDICATIONS  (PRN):  acetaminophen     Tablet .. 650 milliGRAM(s) Oral every 6 hours PRN Temp greater or equal to 38C (100.4F), Mild Pain (1 - 3)  ketorolac   Injectable 15 milliGRAM(s) IV Push every 6 hours PRN Severe Pain (7 - 10)    Allergies    No Known Allergies    Intolerances      Vital Signs Last 24 Hrs  T(C): 36.9 (07 Jul 2024 20:30), Max: 37.1 (07 Jul 2024 13:24)  T(F): 98.5 (07 Jul 2024 20:30), Max: 98.7 (07 Jul 2024 13:24)  HR: 90 (07 Jul 2024 20:30) (85 - 105)  BP: 93/53 (07 Jul 2024 20:30) (93/53 - 111/72)  BP(mean): 67 (07 Jul 2024 20:30) (67 - 67)  RR: 17 (07 Jul 2024 20:30) (17 - 17)  SpO2: 95% (07 Jul 2024 20:30) (95% - 96%)    Parameters below as of 07 Jul 2024 20:30  Patient On (Oxygen Delivery Method): room air        Physical exam:  Constitutional: Cachetic appearing, in no apparent acute distress  Eyes: Anicteric sclerae, moist conjunctivae, see below for CNs  Extremities: No edema    Neurologic:  -Mental status: Awake, alert, oriented to person, place, and time. Speech is fluent with intact naming and comprehension, no dysarthria. Recent and remote memory intact. Follows commands. Attention/concentration intact. Fund of knowledge appropriate.  -Cranial nerves:   II: Visual fields are full to confrontation.  III, IV, VI: Extraocular movements are intact without nystagmus  V:  Facial sensation V1-V3 equal and intact   VII: No asymmetry with forehead wrinkling. No weakness with forced eyelid opening. No facial asymmetry at rest or with activation. Able to provide resistance against provider with puffing of cheeks b/l  XII: Tongue protrudes midline  Motor: Diminished bulk throughout, normal tone. Strength 5/5 in b/l UEs without drift. Strength at least 4/5 in b/l LEs without drift.  Sensation: Intact to light touch bilaterally. No neglect or extinction on double simultaneous testing.  Coordination: Pass pointing with FTN on the RUE at time of code although improved on reassessment. Ataxic with HTS on the RLE. No ataxia on the LUE or LLE  Gait: Ambulated with cane in one hand, holding onto the wall with the other (pt states is baseline). Narrow gait, did not appear to be leaning towards one side    NIHSS: 2    Fingerstick Blood Glucose: CAPILLARY BLOOD GLUCOSE      POCT Blood Glucose.: 121 mg/dL (08 Jul 2024 00:36)    LABS:                        13.4   9.54  )-----------( 292      ( 07 Jul 2024 13:43 )             40.3     07-07    142  |  105  |  9   ----------------------------<  116<H>  3.7   |  24  |  0.73    Ca    9.4      07 Jul 2024 13:43            Urinalysis Basic - ( 07 Jul 2024 13:43 )    Color: x / Appearance: x / SG: x / pH: x  Gluc: 116 mg/dL / Ketone: x  / Bili: x / Urobili: x   Blood: x / Protein: x / Nitrite: x   Leuk Esterase: x / RBC: x / WBC x   Sq Epi: x / Non Sq Epi: x / Bacteria: x        RADIOLOGY & ADDITIONAL STUDIES:  < from: CT Brain Stroke Protocol (07.08.24 @ 01:10) >  IMPRESSION:  No acute intracranial hemorrhage or demarcated infarct.  Mild generalized volume loss.    < end of copied text >    < from: CT Angio Head and Neck, CT Perfusion Stroke Protocol  w/ IV Cont (07.08.24 @ 01:09) >  CT PERFUSION:  Technical limitations: None.    Core infarction: 0 ml  Penumbra / tissue at risk for active ischemia: 0 ml    CTA NECK:  No evidence of significant stenosis or occlusion.    CTA HEAD:  Patent intracranial circulation without flow limiting stenosis.    No evidence of aneurysm.Tiny aneurysms can be beyond the resolution of   CTA technique.    No dural venous sinus thrombosis.    Loss of contrast opacification involving the right internal jugular vein   after the jugular bulb, (series 10 image [id number]). This can be related to   a hypoplastic right internal jugular vein however if there is clinical   concern for right IJ stenosis/occlusion this can be further assessed with   Doppler sonogram.    < end of copied text >    -----------------------------------------------------------------------------------------------------------------  IV-tPA (Y/N):  N  Reason IV-tPA not given: Dizziness and unsteady gait have been chronic over the last several months, L facial weakness resolved

## 2024-07-08 NOTE — PROGRESS NOTE ADULT - PROBLEM SELECTOR PLAN 2
#R/O Pneumonia  Patient with recent [facility] to [facility] PNA v. enterocolitis. VSS and labs wnl, however CXR with ?consolidation RLL. Discharged from [facility] on Levaquin and cefpodoxime, unclear if completed course. Given CTX and Azithromycin in the ED. No signs of overt infection. no cough, fever, or leukocytosis.  - aspiration precautions  - f/u blood cultures   - hold off abx , procal: 0.06.  - f/u UA Pt reports severe HA on and off for past month. HA this AM, along with transient L sided facial droop, prompted stroke code. CT head was negative. Pt is back to baseline with no focal deficits, but still c/o HA that is refractory to toradol, ofirmev, and dilaudid.     PLAN:  - F/u MRI head  - F/u neuro recs  - C/w tylenol 650mg po q6hr for mild pain, dilaudid 2mg q6hr for severe pain  - Elevate head of bed  - Aspiration precautions

## 2024-07-08 NOTE — CONSULT NOTE ADULT - ASSESSMENT
I M    73 y o M with PMH of seizures on lamotrigine, osteoporosis presents to ED with weakness and inability to care for self at home, admitted for safe discharge planning and PT eval.     Problem/Plan - 1:  ·  Problem: Generalized weakness.   ·  Plan: Patient with recent admission to [facility], discharged on 7/3 to [facility], unable to care for self including with eating/ambulating. Diagnosed with infection at the time PNA v. enterocolitis and discharged on antibiotics. Does not meet sepsis criteria on admission. Electrolytes wnl. Low concern for metabolic causes of weakness, no focal deficits on exam which makes stroke unlikely. Likely weakness 2/2 deconditioning given recent hospital stay and poor access to nutrition.   - Passed bedside dysphagia, will start regular diet  - PT consult, f/u recs.  - nutrition consult  - r/o reversible causes --> f/u B12, TSH  - UA, bladder scan due to his urinary hesitancy.   - SW consult.    Problem/Plan - 2:  ·  Problem: Pneumonia.   ·  Plan: #R/O Pneumonia  Patient with recent admission to [facility]. PNA v. enterocolitis. VSS and labs wnl, however CXR with ?consolidation RLL. Discharged from [facility] on Levaquin and cefpodoxime, unclear if completed course. Given CTX and Azithromycin in the ED. No signs of overt infection. no cough, fever, or leukocytosis.  - aspiration precautions  - f/u blood cultures   - hold off abx , procal: 0.06.  - f/u UA.    Problem/Plan - 3:  ·  Problem: Seizure disorder.   ·  Plan: Patient with hx of seizures, reportedly on lamotrigine 100mg bid, unable to verify recent prescriptions on SureScripts.   - c/w home lamotrigine 100mg  - seizure precautions  - med rec in the am.    Problem/Plan - 4:  ·  Problem: Osteoporosis.   ·  Plan: Patient with hx of osteoporosis, not currently taking meds but states he used to take alendronate ?everyday prior to onset of infection.   - PT consult  - f/u  vit D level.    Problem/Plan - 5:  ·  Problem: Prophylactic measure.   ·  Plan: F: s/p 1L NS   E: replete as needed  N: regular diet pending dysphagia screening  DVT ppx: lovenox  Dispo: RMF, pending PM&R consult ,   PT.

## 2024-07-08 NOTE — PHYSICAL THERAPY INITIAL EVALUATION ADULT - GAIT DEVIATIONS NOTED, PT EVAL
decreased joyce/increased time in double stance/decreased step length/decreased weight-shifting ability

## 2024-07-08 NOTE — PROGRESS NOTE ADULT - PROBLEM SELECTOR PLAN 1
Patient with recent admission to [facility], discharged on 7/3 to home [facility], unable to care for self including with eating/ambulating. Diagnosed with infection at the time PNA v. enterocolitis and discharged on antibiotics. Does not meet sepsis criteria on admission. Electrolytes wnl. Low concern for metabolic causes of weakness, no focal deficits on exam which makes stroke unlikely. Likely weakness 2/2 deconditioning given recent hospital stay and poor access to nutrition.   - Passed bedside dysphagia, will start regular diet  - PT consult, f/u recs.  - nutrition consult  - r/o reversible causes --> f/u B12, TSH  - UA, bladder scan due to his urinary hesitancy.   - SW consult Patient with recent admission to [facility], discharged on 7/3 to [facility], unable to care for self including with eating/ambulating. Diagnosed with infection at the time PNA v. enterocolitis and discharged on antibiotics. Does not meet sepsis criteria on admission. Electrolytes wnl. Low concern for metabolic causes of weakness, no focal deficits on exam which makes stroke unlikely. TSH WNL. Likely weakness 2/2 deconditioning given recent hospital stay, poor access to nutrition, and inadequate pain control.    PLAN:  - F/u PT and OT recs  - F/u nutrition consults  - R/o reversible causes --> f/u B12, methylmalonic acid  - Start multivitamin, thiamine 500mg IV x 3d  - Monitor for refeeding syndrome  - F/u A1c

## 2024-07-08 NOTE — PHYSICAL THERAPY INITIAL EVALUATION ADULT - PERTINENT HX OF CURRENT PROBLEM, REHAB EVAL
73y Male with PMHx of seizure disorder on Lamotrigine (stated last seizure was in the 1970s, doesn't remember what his seizures consisted of), cervical spinal stenosis with chronic back pain on PO Morphine, OA, osteoporosis, BPH, glaucoma, former smoker/EtOH use, L intertrochanteric fx with c/c/b provoked LLE DVT s/p 3 months of Eliquis, recent hospitalization at [facility] for enterocolitis (discharged 7/4), who was admitted to [facility] for failure to thrive and possible RLL PNA. Stroke code was called for headache not relieved by NSAIDs and transient L facial weakness. SBP at time of the code was in the low 100s. NIHSS of 2 for R sided ataxia. CTH negative, CTA H/N without high grade stenoses, patent sinuses. CTP negative. Not a candidate for TNK as patient with chronic dizziness/unsteady gait over the last several months and transient L facial weakness had resolved. Not a candidate for thrombectomy as patient without LVO.

## 2024-07-08 NOTE — PROGRESS NOTE ADULT - NS ATTEND AMEND GEN_ALL_CORE FT
The patient is a 73 year old male with multiple comorbidities admitted with FTT in s/o recent enterocolitis and possible PNA. Patient describes several months of headache and gait instability as well. MRI brain pending for further eval.

## 2024-07-08 NOTE — OCCUPATIONAL THERAPY INITIAL EVALUATION ADULT - ADDITIONAL COMMENTS
Patient reports living alone in an elevator access apartment building with 6 JEAN. Patient states he was independent with all ADL's, IADL's and functional mobility with SC in the community, no AD in the home. Patient is R hand dominant and has a walk in shower. Patient was suppose to be receiving a RW and shower chair before this admission.

## 2024-07-08 NOTE — OCCUPATIONAL THERAPY INITIAL EVALUATION ADULT - PERTINENT HX OF CURRENT PROBLEM, REHAB EVAL
Patient is a 74 y/o M with PMH of seizures on lamotrigine, osteoporosis on alendronate  presents to ED with weakness and inability to care for self at home. Pt states he was recently admitted to [facility] side for one week and discharged on 7/3/24. Patient was found to have enterocolitis and given flagyl and ceftriaxone. Patient reported going to [facility] on 7/4 due to worsening symptoms. Patient was discharged same day on levaquin and cefpodoxime for concerns of PNA and took a few doses.  Pt since then has had difficult time at home caring for self including getting food.  Pt reported having difficult time walking secondary to generalized weakness.  Patient reported his symptoms initially started about  2-3 months ago and has been getting worse since. He's had watery diarrhea for over a week and  reported last bowel movement to be on 7/5, has not has one since then due to having poor po intake. Patient reported having a subjective fever, body aches, malaise, headaches, and generalized weakness. with difficulty standing and walking. He denied N,V, abdominal pain, or melena. He reported having urinary hesitancy but no dysuria or hematuria.

## 2024-07-08 NOTE — OCCUPATIONAL THERAPY INITIAL EVALUATION ADULT - DIAGNOSIS, OT EVAL
Patient brought to [facility] 2/2 difficulty caring for self at home, with generalized weakness, difficulty time standing and walking- presents with decreased overall strength, balance, postural control, activity tolerance impacting independence with functional activities and mobility.

## 2024-07-08 NOTE — PROGRESS NOTE ADULT - SUBJECTIVE AND OBJECTIVE BOX
Neurology Stroke Progress Note    INTERVAL HPI/OVERNIGHT EVENTS:  Stroke code called overnight for transient L facial droop and intractable headache per primary team. CT imaging negative. Patient seen and examined. Pt states he is feeling well, in no acute distress. States he has had headache for weeks and has had difficulty managing his pain. Also states has been having difficulty walking for a few months as well. All questions answered.     MEDICATIONS  (STANDING):  enoxaparin Injectable 40 milliGRAM(s) SubCutaneous every 24 hours  lamoTRIgine 100 milliGRAM(s) Oral every 12 hours  melatonin 3 milliGRAM(s) Oral at bedtime  multivitamin 1 Tablet(s) Oral daily  thiamine IVPB 500 milliGRAM(s) IV Intermittent daily    MEDICATIONS  (PRN):  acetaminophen     Tablet .. 650 milliGRAM(s) Oral every 6 hours PRN Temp greater or equal to 38C (100.4F), Mild Pain (1 - 3)  HYDROmorphone   Tablet 2 milliGRAM(s) Oral every 6 hours PRN Severe Pain (7 - 10)  lidocaine   4% Patch 1 Patch Transdermal daily PRN pain      Allergies    No Known Allergies    Intolerances        Vital Signs Last 24 Hrs  T(C): 36.8 (08 Jul 2024 12:02), Max: 36.9 (07 Jul 2024 20:30)  T(F): 98.3 (08 Jul 2024 12:02), Max: 98.5 (07 Jul 2024 20:30)  HR: 98 (08 Jul 2024 12:02) (80 - 98)  BP: 103/61 (08 Jul 2024 12:02) (93/53 - 148/69)  BP(mean): 67 (07 Jul 2024 20:30) (67 - 67)  RR: 18 (08 Jul 2024 12:02) (17 - 18)  SpO2: 96% (08 Jul 2024 12:02) (95% - 97%)    Parameters below as of 08 Jul 2024 12:02  Patient On (Oxygen Delivery Method): room air    Physical exam:  General: No acute distress, awake and alert  Eyes: Anicteric sclerae, moist conjunctivae, see below for CNs  Neck: trachea midline  Cardiovascular: Regular rate and rhythm  Pulmonary: No use of accessory muscles  GI: Abdomen soft, non-distended, non-tender  Extremities: Radial and DP pulses +2, no edema    Neurologic:  -Mental status: Awake, alert, oriented to person, place, and time. Speech is fluent with intact naming, repetition, and comprehension, no dysarthria. Recent and remote memory intact. Follows commands. Attention/concentration intact. Fund of knowledge appropriate.  -Cranial nerves:   II: Visual fields are full to confrontation.  III, IV, VI: Extraocular movements are intact without nystagmus. Pupils equally round and reactive to light  V:  Facial sensation V1-V3 equal and intact   VII: Left facial asymmetry (baseline per pt), normal eye closure, able to lift eyebrows symmetrically.   VIII: Hearing is grossly intact   IX, X: Uvula is midline and soft palate rises symmetrically  XI: Head turning and shoulder shrug are intact.  XII: Tongue protrudes midline  Motor: Normal bulk and tone. Strength bilateral upper extremity 4+/5 symmetric downward drift, bilateral lower extremities 4+/5.  Sensation: Intact to light touch bilaterally. No neglect or extinction on double simultaneous testing.  Coordination: ? LUE dysmetria on finger to nose and RLE dysmetria on heel-to- shin however unclear if confounded due to severe back pain that is radiating to legs per pt.   Gait: Deferred     LABS:                        11.8   8.76  )-----------( 258      ( 08 Jul 2024 06:55 )             36.8     07-08    140  |  105  |  19  ----------------------------<  103<H>  3.6   |  25  |  0.78    Ca    9.1      08 Jul 2024 14:55  Phos  3.8     07-08  Mg     1.9     07-08    TPro  6.9  /  Alb  3.9  /  TBili  0.2  /  DBili  x   /  AST  14  /  ALT  23  /  AlkPhos  80  07-08      Urinalysis Basic - ( 08 Jul 2024 14:55 )    Color: x / Appearance: x / SG: x / pH: x  Gluc: 103 mg/dL / Ketone: x  / Bili: x / Urobili: x   Blood: x / Protein: x / Nitrite: x   Leuk Esterase: x / RBC: x / WBC x   Sq Epi: x / Non Sq Epi: x / Bacteria: x    RADIOLOGY & ADDITIONAL TESTS:    reviewed

## 2024-07-08 NOTE — DIETITIAN INITIAL EVALUATION ADULT - PROBLEM SELECTOR PLAN 3
Patient with hx of seizures, reportedly on lamotrigine 100mg bid, unable to verify recent prescriptions on SureScripts.   - c/w home lamotrigine 100mg  - seizure precautions  - med rec in the am.

## 2024-07-08 NOTE — DIETITIAN INITIAL EVALUATION ADULT - OTHER INFO
73M with PMH of seizures and osteoporosis who presented with weakness, poor PO intake and inability to care for himself at home, admitted for discharge planning and PT evaluation.     Pt seen on 7UR for assessment. Labs and medication orders reviewed. Ordered for multivitamin, thiamine. Electrolytes WNL. On Regular diet. Pt reports poor PO intake x1 month PTA due to decreased mobility and therefore difficulty grocery shopping and preparing meals. Diet recall includes little to no intake, occasional frozen meals. Endorses contact with community SW to enroll in Meals on Wheels program but no recent update - RD communicated to SW. Pt reports UBW ~130lb; current admission wt 125lb/56.7kg indicates 5lb/4% wt loss over unknown time frame. Nutrition-focused physical examination notable for moderate-severe muscle and fat wasting. Per ASPEN guidelines, pt meets criteria for severe malnutrition - see nutrition risk notification. Pt denies difficulty chewing/swallowing. Denies nausea/vomiting/diarrhea, endorses bowel irregularity with last BM x4 days ago. Pt confirms no known food allergies. No Sabianist/ethnic/cultural food preferences noted. No pressure injuries or edema documented, Leo score 13. RD reviewed formulary, pt reports Ensure may have caused loose BM PTA however pt wants to trial again to confirm, RD educated on non-dairy alternative if Ensure not tolerated. See nutrition recommendations - RD communicated to team. RD to remain available.

## 2024-07-08 NOTE — PROGRESS NOTE ADULT - ASSESSMENT
Patient is a 72 y/o M with PMH of seizures on lamotrigine, osteoporosis presents to ED with weakness and inability to care for self at home, admitted for safe discharge planning and PT eval.  Patient is a 74 y/o M with PMH of seizures, osteoporosis presents to ED with weakness, poor po intake, and inability to care for self at home, admitted for safe discharge planning and PT eval.

## 2024-07-08 NOTE — OCCUPATIONAL THERAPY INITIAL EVALUATION ADULT - LOWER BODY DRESSING, PREVIOUS LEVEL OF FUNCTION, OT EVAL
[FreeTextEntry1] : pt notes itchy ears and using hair pin to scratch inside.\par pt also notes using old mascara last week which she rarely does and then developed unilateral (left eye) redness worsening now with discharge,caking morning\par will throw out mascara, warm compress and drops as above\par \par left ear canal with erythema, dry\par no infection noted\par \par \par coordinated with NP to obtain depends 2X, chucks, and gloves\par patient with impaired mobility and incontince\par commode at bedside\par relates to water pill use, no changes noted\par in future once cardiopulmonary issues addressed will consider further evaluation\par pt prefers this as well\par  independent

## 2024-07-08 NOTE — CONSULT NOTE ADULT - SUBJECTIVE AND OBJECTIVE BOX
NEUROLOGY CONSULT    HPI:  Patient is a 74 y/o M with PMH of seizures on lamotrigine, osteoporosis on alendronate presenting to ED with weakness and inability to care for self at home. Pt was recently admitted to [facility] side for enterocolitis for one week, given flagyl and ceftriaxone, and discharged on 7/3/24. Patient reported going to [facility] on 7/4 due to worsening symptoms, discharged same day on levaquin and cefpodoxime for concerns of PNA and took a few doses. Pt reported having difficult time walking secondary to generalized weakness.    Neurology consulted for refractory headache. Patient states that headache started a couple of months ago without triggering events. He feels dull pain around and behind his both eyes, 10/10, constantly there, not going away but not getting worse. Headache is associated with lightheadedness but denies spinning sensation. Patient denies nausea/vomiting in the beginning but recently has nausea due to enterocolitis. Denies light/noise sensitivity, vision changes, diplopia, flashes, ringing ear, confusion. Patient states he takes morphine for his chronic back pain and morphine can help with headache a little. Denies recreational drug use, smoking cigarettes or alcohol use. During hospitalization here, primary team tried multiple doses of Tylenol, Toradol, Dilaudid without improvement of headache.      MEDICATIONS  MEDICATIONS  (STANDING):  enoxaparin Injectable 40 milliGRAM(s) SubCutaneous every 24 hours  lamoTRIgine 100 milliGRAM(s) Oral every 12 hours  melatonin 3 milliGRAM(s) Oral at bedtime  multivitamin 1 Tablet(s) Oral daily  thiamine IVPB 500 milliGRAM(s) IV Intermittent daily    MEDICATIONS  (PRN):  acetaminophen     Tablet .. 650 milliGRAM(s) Oral every 6 hours PRN Temp greater or equal to 38C (100.4F), Mild Pain (1 - 3)  HYDROmorphone   Tablet 2 milliGRAM(s) Oral every 6 hours PRN Severe Pain (7 - 10)  lidocaine   4% Patch 1 Patch Transdermal daily PRN pain      FAMILY HISTORY:  FH: lymphoma (Sibling)      SOCIAL HISTORY: negative for tobacco, alcohol, or ilicit drug use.    Allergies    No Known Allergies    Intolerances      Neurological Examination:  General: Appearance is consistent with chronologic age.   Cognitive/Language: Awake, alert, and oriented to person, place, time and date. Fund of knowledge is appropriate.  Naming, comprehension intact. Nondysarthric.  Cranial Nerves  - Eyes: EOMI w/o nystagmus, skew or reported double vision.  PERRL.  No ptosis/weakness of eyelid closure.  - Face: Facial sensation normal V1 - 3, no facial asymmetry.  - Ears/Nose/Throat:  Hearing grossly intact b/l to finger rub.  Palate elevates midline.  Tongue and uvula midline.  Motor exam: Normal tone and bulk. No tenderness, twitching, tremors or involuntary movements.            Upper extremity                  Bicep     Tricep     HG                                                 R      5/5 5/5 5/5                                                    L       5/5 5/5 5/5              Lower extremity                   HF        KE        DF         PF                                                  R     5/5 5/5 5/5 5/5                                               L      5/5 5/5 5/5 5/5    Sensory examination: Intact to light touch and pinprick, temperature and vibration in all extremities.  Reflexes: 2+ b/l biceps, triceps, patella and achilles. Plantar response downgoing b/l. Christina, clonus absent.  Cerebellum: FTN intact. No dysmetria.    LABS:                        11.8   8.76  )-----------( 258      ( 08 Jul 2024 06:55 )             36.8     07-08    137  |  105  |  15  ----------------------------<  96  3.6   |  23  |  1.06    Ca    8.8      08 Jul 2024 06:55  Phos  3.8     07-08  Mg     1.9     07-08      Hemoglobin A1C:   Vitamin B12 Vitamin B12, Serum: 1033 pg/mL (07-08 @ 06:55)      CAPILLARY BLOOD GLUCOSE      POCT Blood Glucose.: 121 mg/dL (08 Jul 2024 00:36)      Urinalysis Basic - ( 08 Jul 2024 06:55 )    Color: x / Appearance: x / SG: x / pH: x  Gluc: 96 mg/dL / Ketone: x  / Bili: x / Urobili: x   Blood: x / Protein: x / Nitrite: x   Leuk Esterase: x / RBC: x / WBC x   Sq Epi: x / Non Sq Epi: x / Bacteria: x    RADIOLOGY, EKG AND ADDITIONAL TESTS: Reviewed.       NEUROLOGY CONSULT    HPI:  Patient is a 74 y/o M with PMH of seizures on lamotrigine, osteoporosis on alendronate presenting to ED with weakness and inability to care for self at home. Pt was recently admitted to [facility] side for enterocolitis for one week, given flagyl and ceftriaxone, and discharged on 7/3/24. Patient reported going to [facility] on 7/4 due to worsening symptoms, discharged same day on levaquin and cefpodoxime for concerns of PNA and took a few doses. Pt reported having difficult time walking secondary to generalized weakness.    Neurology consulted for refractory headache. Patient states that headache started a couple of months ago without triggering events. He feels dull pain around and behind his both eyes, 10/10, constantly there, not going away but not getting worse. Headache is associated with lightheadedness but denies spinning sensation. Patient denies nausea/vomiting in the beginning but recently has nausea due to enterocolitis. Denies light/noise sensitivity, vision changes, diplopia, flashes, ringing ear, confusion. Patient states he takes morphine for his chronic back pain and morphine can help with headache a little. Denies recreational drug use, smoking cigarettes or alcohol use. During hospitalization here, primary team tried multiple doses of Tylenol, Toradol, Dilaudid without improvement of headache.      MEDICATIONS  MEDICATIONS  (STANDING):  enoxaparin Injectable 40 milliGRAM(s) SubCutaneous every 24 hours  lamoTRIgine 100 milliGRAM(s) Oral every 12 hours  melatonin 3 milliGRAM(s) Oral at bedtime  multivitamin 1 Tablet(s) Oral daily  thiamine IVPB 500 milliGRAM(s) IV Intermittent daily    MEDICATIONS  (PRN):  acetaminophen     Tablet .. 650 milliGRAM(s) Oral every 6 hours PRN Temp greater or equal to 38C (100.4F), Mild Pain (1 - 3)  HYDROmorphone   Tablet 2 milliGRAM(s) Oral every 6 hours PRN Severe Pain (7 - 10)  lidocaine   4% Patch 1 Patch Transdermal daily PRN pain      FAMILY HISTORY:  FH: lymphoma (Sibling)      SOCIAL HISTORY: negative for tobacco, alcohol, or ilicit drug use.    Allergies    No Known Allergies    Intolerances      Neurological Examination:  General: Appearance is consistent with chronologic age.   Cognitive/Language: Awake, alert, and oriented to person, place, time and date. Fund of knowledge is appropriate.  Naming, comprehension intact. Nondysarthric.  Cranial Nerves  - Eyes: Visual field full. EOMI w/o nystagmus, skew or reported double vision.  PERRL.  No ptosis/weakness of eyelid closure.  - Face: Facial sensation normal V1 - 3, no facial asymmetry.  - Ears/Nose/Throat:  Hearing grossly intact b/l to finger rub.  Palate elevates midline.  Tongue and uvula midline.  Motor exam: Normal tone and bulk. No tenderness, twitching, tremors or involuntary movements.            Upper extremity                  Bicep     Tricep     HG                                                 R      5/5 5/5          5/5                                                    L       5/5 5/5          5/5              Lower extremity                   HF        KE        DF         PF                                                  R     5/5 5/5 5/5 5/5                                               L      5/5 5/5 5/5        5/5    Sensory examination: Intact to light touch and pinprick, temperature and vibration in all extremities.  Reflexes: 2+ b/l biceps, triceps, and achilles. 1+ L Patella, 0 R Patella. Plantar response downgoing b/l. Christina, clonus absent.  Cerebellum: FTN intact. HKS R abnormal.    LABS:                        11.8   8.76  )-----------( 258      ( 08 Jul 2024 06:55 )             36.8     07-08    137  |  105  |  15  ----------------------------<  96  3.6   |  23  |  1.06    Ca    8.8      08 Jul 2024 06:55  Phos  3.8     07-08  Mg     1.9     07-08      Hemoglobin A1C:   Vitamin B12 Vitamin B12, Serum: 1033 pg/mL (07-08 @ 06:55)      CAPILLARY BLOOD GLUCOSE      POCT Blood Glucose.: 121 mg/dL (08 Jul 2024 00:36)      Urinalysis Basic - ( 08 Jul 2024 06:55 )    Color: x / Appearance: x / SG: x / pH: x  Gluc: 96 mg/dL / Ketone: x  / Bili: x / Urobili: x   Blood: x / Protein: x / Nitrite: x   Leuk Esterase: x / RBC: x / WBC x   Sq Epi: x / Non Sq Epi: x / Bacteria: x    RADIOLOGY, EKG AND ADDITIONAL TESTS: Reviewed.

## 2024-07-08 NOTE — CONSULT NOTE ADULT - ATTENDING COMMENTS
Patient seen on 7/9/24  He reports severe 10/10 bilateral retro-orbital headache for the past 2 months  Stroke code called yesterday for facial droop which quickly resolved  CT head, CTA head/neck normal  Neurologic exam for me today is unremarkable other than bitemporal tenderness, however ESR/CRP are normal which rules out temporal arteritis   Of note he has been prescribed a total of 360mg morphine per day in the past, although last script was from Feb 2024  f/u MRI brain per stroke team  Can try toradol, reglan, depakote 500mg x 1 and dexamethasone 10mg x 1 for possible status migrainosus, although unlikely in patient without prior history of migraine headaches  I suspect there is a component of opioid induced pain hypersensitivity.

## 2024-07-08 NOTE — PROGRESS NOTE ADULT - PROBLEM SELECTOR PLAN 8
Patient with hx of osteoporosis, not currently taking meds but states he used to take alendronate ?everyday prior to onset of infection. Pt was supposedly switched to bisphosphonate injection right before admission.    - F/u vit D level  - F/[facility] rec [facility]

## 2024-07-08 NOTE — PROGRESS NOTE ADULT - ASSESSMENT
73y Male with PMHx of seizure disorder on Lamotrigine (stated last seizure was in the 1970s, doesn't remember what his seizures consisted of), cervical spinal stenosis with chronic back pain on PO Morphine, OA, osteoporosis, BPH, glaucoma, former smoker/EtOH use, L intertrochanteric fx with c/c/b provoked LLE DVT s/p 3 months of Eliquis, recent hospitalization at [facility] for enterocolitis (discharged 7/4), who was admitted to [facility] for failure to thrive and possible RLL PNA. Stroke code was called on 7/8 for headache not relieved by NSAIDs and transient L facial weakness. SBP at time of the code was in the low 100s. NIHSS of 2 for R sided ataxia. CTH negative, CTA H/N without high grade stenoses, patent sinuses. CTP negative. Not a candidate for TNK as patient with chronic dizziness/unsteady gait over the last several months and transient L facial weakness had resolved. Not a candidate for thrombectomy as patient without LVO.    A/P: Pt with cerebellar signs on neurologic exam however unclear if it is confounded by severe back pain radiating to lower extremities. Due to hx of difficulty ambulating over past few months with cerebellar signs present, would recommend MRI brain non contrast short stroke protocol to further evaluate for ischemic cause.     Plan:   - Can hold off on antiplatelet and statin for now pending MRI results as patient without intra/extracranial stenoses   - B12 level 1033, please obtain folic acid level   - Rest of care per primary team     Discussed with Stroke Attending, Dr. Low

## 2024-07-08 NOTE — DIETITIAN INITIAL EVALUATION ADULT - PROBLEM SELECTOR PROBLEM 2
[facility]    PROGRESS NOTE    Patient:  Zhanna Vaughn    :  1991    Date of Service:  2022      The encounter diagnosis was Generalized anxiety disorder.    Data:  Zhanna discussed feeling increased agitation, and recently becoming tearful after coping with traffic and routine changes. She discussed her friendship history and her wish to be more socially connected.    Intervention:  Cognitive Behavioral Strategies and Insight Oriented Strategies    Patient continues to be involved in service planning:  YES    Describe above interventions:  Gathered information on the clt's progress. Encouraged the clt to process her history of friendships. Helped the client to identify hopeless thinking and challenge it.     Patient's response to interventions:  Zhanna presented with anxious/depressed mood and congruent affect. She responded well to the therapist's challenging and identified thoughts that weren't helpful. Her mood and affect seemed to improve over the course of the session.    Continue to support patient's efforts and progress towards established treatment plan goals in the following ways:  Continue to help the clt challenge her thinking and experience more hopefulness.    Off-site:  No   This visit is being performed virtually.  Clinician Location: [facility]  Zhanna's Location: Home   This encounter was 50 minutes.    
Pneumonia

## 2024-07-08 NOTE — PHYSICAL THERAPY INITIAL EVALUATION ADULT - ADDITIONAL COMMENTS
pt lives in an apartment w/ 6 steps to enter. Ambulates w/ use of SC. Denies hx of recent falls. no home health aide

## 2024-07-08 NOTE — CONSULT NOTE ADULT - SUBJECTIVE AND OBJECTIVE BOX
PAIN MANAGEMENT CONSULT NOTE    Chief Complaint:    HPI:  Patient is a 74 y/o M with PMH of seizures on lamotrigine, osteoporosis on alendronate  presents to ED with weakness and inability to care for self at home. Pt states he was recently admitted to [facility] for one week and discharged on 7/3/24. Patient was found to have enterocolitis and given flagyl and ceftriaxone. Patient reported going to [facility] on 7/4 due to worsening symptoms. Patient was discharged same day on levaquin and cefpodoxime for concerns of PNA and took a few doses.  Pt since then has had difficult time at home caring for self including getting food.  Pt reported having difficult time walking secondary to generalized weakness.  Patient reported his symptoms initially started about  2-3 months ago and has been getting worse since. He's had watery diarrhea for over a week and  reported last bowel movement to be on 7/5, has not has one since then due to having poor po intake. Patient reported having a subjective fever, body aches, malaise, headaches, and generalized weakness. with difficulty standing and walking. He denied N,V, abdominal pain, or melena. He reported having urinary hesitancy but no dysuria or hematuria.    Per chart review, patient was admitted to [facility] on 6/26/24 with watery diarrhea and abdominal pain, found to be tachycardic with leukocytosis (WBC 20.4, neutrophil predominant) 2/2 sepsis i/s/o enterocolitis. Treated with CTX and flagyl with resolution of diarrhea, stool studies never sent.     In the ED:  Vital Signs: T 98.7 F, , /72, RR 17, SpO2 96% on room air  Labs: within normal limits  EKG: NSR.   CXR: RLL field consolidation  Interventions: 1L NS bolus, CTX 1g, azithromycin 500mg  Consults: PT (07 Jul 2024 15:57)      PAST MEDICAL & SURGICAL HISTORY:  Seizures      Osteoporosis      History of spinal surgery          FAMILY HISTORY:  FH: lymphoma (Sibling)        SOCIAL HISTORY:  [ ] Denies Smoking, Alcohol, or Drug Use      HOME MEDICATIONS:   Please refer to initial HNP    Allergies    No Known Allergies    Intolerances        PAIN MEDICATIONS:  acetaminophen     Tablet .. 650 milliGRAM(s) Oral every 6 hours PRN  HYDROmorphone   Tablet 2 milliGRAM(s) Oral every 6 hours PRN  lamoTRIgine 100 milliGRAM(s) Oral every 12 hours  melatonin 3 milliGRAM(s) Oral at bedtime    Heme:  enoxaparin Injectable 40 milliGRAM(s) SubCutaneous every 24 hours    Antibiotics:    Cardiovascular:    GI:    Endocrine:    All Other Medications:  lidocaine   4% Patch 1 Patch Transdermal daily PRN  multivitamin 1 Tablet(s) Oral daily  thiamine IVPB 500 milliGRAM(s) IV Intermittent daily      Vital Signs Last 24 Hrs  T(C): 36.8 (08 Jul 2024 12:02), Max: 36.9 (07 Jul 2024 20:30)  T(F): 98.3 (08 Jul 2024 12:02), Max: 98.5 (07 Jul 2024 20:30)  HR: 98 (08 Jul 2024 12:02) (80 - 98)  BP: 103/61 (08 Jul 2024 12:02) (93/53 - 148/69)  BP(mean): 67 (07 Jul 2024 20:30) (67 - 67)  RR: 18 (08 Jul 2024 12:02) (17 - 18)  SpO2: 96% (08 Jul 2024 12:02) (95% - 97%)    Parameters below as of 08 Jul 2024 12:02  Patient On (Oxygen Delivery Method): room air        LABS:                        11.8   8.76  )-----------( 258      ( 08 Jul 2024 06:55 )             36.8     07-08    137  |  105  |  15  ----------------------------<  96  3.6   |  23  |  1.06    Ca    8.8      08 Jul 2024 06:55  Phos  3.8     07-08  Mg     1.9     07-08        Urinalysis Basic - ( 08 Jul 2024 06:55 )    Color: x / Appearance: x / SG: x / pH: x  Gluc: 96 mg/dL / Ketone: x  / Bili: x / Urobili: x   Blood: x / Protein: x / Nitrite: x   Leuk Esterase: x / RBC: x / WBC x   Sq Epi: x / Non Sq Epi: x / Bacteria: x        RADIOLOGY:    Drug Screen:        REVIEW OF SYSTEMS:  CONSTITUTIONAL: Denies fever or fatigue   EYES: Denies eye pain, visual disturbances  HEENT: Denies difficulty hearing, throat/neck pain or stiffness  RESPIRATORY: Denies SOB, cough, wheezing  CARDIOVASCULAR: Denies chest pain, palpitations.   GASTROINTESTINAL: Endorses +flatus, BMs. Denies nausea, vomiting, abdominal or epigastric pain.   GENITOURINARY: Denies dysuria, frequency, or incontinence  NEUROLOGICAL: Endorses numbness, tingling to bilateral legs. Denies headaches, loss of strength, tremors, dizziness or lightheadedness with pain medications.   MUSCULOSKELETAL: Denies joint pain or swelling      FUNCTIONAL ASSESSMENT:  PAIN SCORE AT REST:         SCALE USED: (1-10 VNRS)  PAIN SCORE WITH ACTIVITY:         SCALE USED: (1-10 VNRS)    PAIN ASSESSMENT:  - 10/10 headache, frontal/facial pain around eyes + 10/10 back pain radiating down bilateral legs, burning to bottoms of feet, now up to neck    FOCUSED PHYSICAL EXAM  GENERAL: Laying in bed, NAD  NEURO: CN II-XII grossly intact, EOMI  PULM: unlabored  CV: Regular rate and rhythm  ABDOMEN: Soft, Nontender, Nondistended  EXTREMITIES:  2+ Peripheral Pulses, No clubbing, cyanosis, or edema  SKIN: No rashes or lesions      ASSESSMENT:  74 y/o M with PMH of seizures, osteoporosis presents to ED with weakness, poor po intake, and inability to care for self at home, admitted for safe discharge planning and PT eval.       PLAN:   - adjust tylenol to 1gm q8h  - start lyrica 25mg q8h  - adjust lidocaine patch to daily scheduled  - start oxycodone 5mg q6h prn moderate-severe pain***  - monitor closely for oversedation, ensure narcan is ordered  - escalate bowel regimen as needed for bowel movement daily  ***recs not finalized***    - Bowel regimen: Senna    - Nausea ppx: Zofran as needed  - Functional Goals: Pt will get OOB with PT today. Pt will resume previous level of activity without impairment from surgery.   - Additional Consults: None recommended.   - Additional Labs/Imaging:  None recommended.     - Discharge Planning: per primary team  - Pain Management follow up plan: will continue to follow    Plan d/w Dr. Avila.     Dior Haro NP  Acute Pain Service PAIN MANAGEMENT CONSULT NOTE    Chief Complaint:    HPI:  Patient is a 74 y/o M with PMH of seizures on lamotrigine, osteoporosis on alendronate  presents to ED with weakness and inability to care for self at home. Pt states he was recently admitted to [facility] for one week and discharged on 7/3/24. Patient was found to have enterocolitis and given flagyl and ceftriaxone. [facility]. Patient was discharged same day on levaquin and cefpodoxime for concerns of PNA and took a few doses.  Pt since then has had difficult time at home caring for self including getting food.  Pt reported having difficult time walking secondary to generalized weakness.  Patient reported his symptoms initially started about  2-3 months ago and has been getting worse since. He's had watery diarrhea for over a week and  reported last bowel movement to be on 7/5, has not has one since then due to having poor po intake. Patient reported having a subjective fever, body aches, malaise, headaches, and generalized weakness. with difficulty standing and walking. He denied N,V, abdominal pain, or melena. He reported having urinary hesitancy but no dysuria or hematuria.    Per chart review, patient was admitted to [facility] on 6/26/24 with watery diarrhea and abdominal pain, found to be tachycardic with leukocytosis (WBC 20.4, neutrophil predominant) 2/2 sepsis i/s/o enterocolitis. Treated with CTX and flagyl with resolution of diarrhea, stool studies never sent.     Patient seen and examined at bedside. Reports he has had chronic back pain starting in 1996. Has been taking morphine 200mg in AM, 60mg in afternoon, and 100mg at night. Reports he has not taken anything since he moved from [place] to [place] a few months ago.     iSTOP (2024):  A	N	O	02/05/2024	02/14/2024	morphine sulfate ir 30 mg tab	60	30	Emelia Richardson	[id number]	[id number]  A	N	O	02/05/2024	02/14/2024	morphine sulf er 100 mg tablet	30	30	Emelia Richardson	[id number]	[id number]  A	N	O	02/05/2024	02/14/2024	morphine sulf er 200 mg tablet	30	30	Emelia Richardson	[id number]	[id number]  A	N	O	01/03/2024	01/17/2024	morphine sulfate ir 30 mg tab	60	30	Jodie Mckinnon	[id number]	[id number]  A	N	O	01/03/2024	01/17/2024	morphine sulf er 100 mg tablet	30	30	JulioJodie gonzalez	[id number]	[id number]  A	N	O	01/03/2024	01/17/2024	morphine sulf er 200 mg tablet	30	30	Pratibha Jodie	[id number]	[id number]    In the ED:  Vital Signs: T 98.7 F, , /74, RR 17, SpO2 96% on room air  Labs: within normal limits  EKG: NSR.   CXR: RLL field consolidation  Interventions: 1L NS bolus, CTX 1g, azithromycin 500mg  Consults: PT (07 Jul 2024 15:57)      PAST MEDICAL & SURGICAL HISTORY:  Seizures      Osteoporosis      History of spinal surgery          FAMILY HISTORY:  FH: lymphoma (Sibling)        SOCIAL HISTORY:  [ ] Denies Smoking, Alcohol, or Drug Use      HOME MEDICATIONS:   Please refer to initial HNP    Allergies    No Known Allergies    Intolerances        PAIN MEDICATIONS:  acetaminophen     Tablet .. 650 milliGRAM(s) Oral every 6 hours PRN  HYDROmorphone   Tablet 2 milliGRAM(s) Oral every 6 hours PRN  lamoTRIgine 100 milliGRAM(s) Oral every 12 hours  melatonin 3 milliGRAM(s) Oral at bedtime    Heme:  enoxaparin Injectable 40 milliGRAM(s) SubCutaneous every 24 hours    Antibiotics:    Cardiovascular:    GI:    Endocrine:    All Other Medications:  lidocaine   4% Patch 1 Patch Transdermal daily PRN  multivitamin 1 Tablet(s) Oral daily  thiamine IVPB 500 milliGRAM(s) IV Intermittent daily      Vital Signs Last 24 Hrs  T(C): 36.8 (08 Jul 2024 12:02), Max: 36.9 (07 Jul 2024 20:30)  T(F): 98.3 (08 Jul 2024 12:02), Max: 98.5 (07 Jul 2024 20:30)  HR: 98 (08 Jul 2024 12:02) (80 - 98)  BP: 103/61 (08 Jul 2024 12:02) (93/53 - 148/69)  BP(mean): 67 (07 Jul 2024 20:30) (67 - 67)  RR: 18 (08 Jul 2024 12:02) (17 - 18)  SpO2: 96% (08 Jul 2024 12:02) (95% - 97%)    Parameters below as of 08 Jul 2024 12:02  Patient On (Oxygen Delivery Method): room air        LABS:                        11.8   8.76  )-----------( 258      ( 08 Jul 2024 06:55 )             36.8     07-08    137  |  105  |  15  ----------------------------<  96  3.6   |  23  |  1.06    Ca    8.8      08 Jul 2024 06:55  Phos  3.8     07-08  Mg     1.9     07-08        Urinalysis Basic - ( 08 Jul 2024 06:55 )    Color: x / Appearance: x / SG: x / pH: x  Gluc: 96 mg/dL / Ketone: x  / Bili: x / Urobili: x   Blood: x / Protein: x / Nitrite: x   Leuk Esterase: x / RBC: x / WBC x   Sq Epi: x / Non Sq Epi: x / Bacteria: x        RADIOLOGY:    Drug Screen:        REVIEW OF SYSTEMS:  CONSTITUTIONAL: Denies fever or fatigue   EYES: Denies eye pain, visual disturbances  HEENT: Denies difficulty hearing, throat/neck pain or stiffness  RESPIRATORY: Denies SOB, cough, wheezing  CARDIOVASCULAR: Denies chest pain, palpitations.   GASTROINTESTINAL: Endorses +flatus, BMs. Denies nausea, vomiting, abdominal or epigastric pain.   GENITOURINARY: Denies dysuria, frequency, or incontinence  NEUROLOGICAL: Endorses numbness, tingling to bilateral legs. Denies headaches, loss of strength, tremors, dizziness or lightheadedness with pain medications.   MUSCULOSKELETAL: Denies joint pain or swelling      FUNCTIONAL ASSESSMENT:  PAIN SCORE AT REST:         SCALE USED: (1-10 VNRS)  PAIN SCORE WITH ACTIVITY:         SCALE USED: (1-10 VNRS)    PAIN ASSESSMENT:  - 10/10 headache, frontal/facial pain around eyes + 10/10 back pain radiating down bilateral legs, burning to bottoms of feet, now up to neck    FOCUSED PHYSICAL EXAM  GENERAL: Laying in bed, NAD  NEURO: CN II-XII grossly intact, EOMI  PULM: unlabored  CV: Regular rate and rhythm  ABDOMEN: Soft, Nontender, Nondistended  EXTREMITIES:  2+ Peripheral Pulses, No clubbing, cyanosis, or edema  SKIN: No rashes or lesions      ASSESSMENT:  74 y/o M with PMH of seizures, osteoporosis presents to ED with weakness, poor po intake, and inability to care for self at home, admitted for safe discharge planning and PT eval.       PLAN:   - adjust tylenol to 1gm q8h  - start lyrica 25mg q8h  - adjust lidocaine patch to daily scheduled  - start oxycodone 5mg q6h prn moderate-severe pain***  - monitor closely for oversedation, ensure narcan is ordered  - escalate bowel regimen as needed for bowel movement daily  ***recs not finalized***    - Bowel regimen: Senna    - Nausea ppx: Zofran as needed  - Functional Goals: Pt will get OOB with PT today. Pt will resume previous level of activity without impairment from surgery.   - Additional Consults: None recommended.   - Additional Labs/Imaging:  None recommended.     - Discharge Planning: per primary team  - Pain Management follow up plan: will continue to follow    Plan d/w Dr. Avila.     Dior Haro NP  Acute Pain Service PAIN MANAGEMENT CONSULT NOTE    Chief Complaint:    HPI:  Patient is a 72 y/o M with PMH of seizures on lamotrigine, osteoporosis on alendronate  presents to ED with weakness and inability to care for self at home. Pt states he was recently admitted to [facility] for one week and discharged on 7/3/24. Patient was found to have enterocolitis and given flagyl and ceftriaxone. [facility] Patient was discharged same day on levaquin and cefpodoxime for concerns of PNA and took a few doses.  Pt since then has had difficult time at home caring for self including getting food.  Pt reported having difficult time walking secondary to generalized weakness.  Patient reported his symptoms initially started about  2-3 months ago and has been getting worse since. He's had watery diarrhea for over a week and  reported last bowel movement to be on 7/5, has not has one since then due to having poor po intake. Patient reported having a subjective fever, body aches, malaise, headaches, and generalized weakness. with difficulty standing and walking. He denied N,V, abdominal pain, or melena. He reported having urinary hesitancy but no dysuria or hematuria.    Per chart review, patient was admitted to [facility] on 6/26/24 with watery diarrhea and abdominal pain, found to be tachycardic with leukocytosis (WBC 20.4, neutrophil predominant) 2/2 sepsis i/s/o enterocolitis. Treated with CTX and flagyl with resolution of diarrhea, stool studies never sent.     Patient seen and examined at bedside. Reports he has had chronic back pain starting in 1996. Has been taking morphine 200mg in AM, 60mg in afternoon, and 100mg at night. Reports he has not taken anything since he moved from [place] to [place] a few months ago.     iSTOP (2024):  A	N	O	02/05/2024	02/14/2024	morphine sulfate ir 30 mg tab	60	30	Emelia Richardson	[id number]	[id number]  A	N	O	02/05/2024	02/14/2024	morphine sulf er 100 mg tablet	30	30	Emelia Richardson	[id number]	[id number]  A	N	O	02/05/2024	02/14/2024	morphine sulf er 200 mg tablet	30	30	Emelia Richardson	[id number]	[id number]  A	N	O	01/03/2024	01/17/2024	morphine sulfate ir 30 mg tab	60	30	Jodie Mckinnon	[id number]	[id number]  A	N	O	01/03/2024	01/17/2024	morphine sulf er 100 mg tablet	30	30	JulioJodie gonzalez	[id number]	[id number]  A	N	O	01/03/2024	01/17/2024	morphine sulf er 200 mg tablet	30	30	Pratibha Jodie	[id number]	[id number]    In the ED:  Vital Signs: T 98.7 F, , /74, RR 17, SpO2 96% on room air  Labs: within normal limits  EKG: NSR.   CXR: RLL field consolidation  Interventions: 1L NS bolus, CTX 1g, azithromycin 500mg  Consults: PT (07 Jul 2024 15:57)      PAST MEDICAL & SURGICAL HISTORY:  Seizures      Osteoporosis      History of spinal surgery          FAMILY HISTORY:  FH: lymphoma (Sibling)        SOCIAL HISTORY:  [ ] Denies Smoking, Alcohol, or Drug Use      HOME MEDICATIONS:   Please refer to initial HNP    Allergies    No Known Allergies    Intolerances        PAIN MEDICATIONS:  acetaminophen     Tablet .. 650 milliGRAM(s) Oral every 6 hours PRN  HYDROmorphone   Tablet 2 milliGRAM(s) Oral every 6 hours PRN  lamoTRIgine 100 milliGRAM(s) Oral every 12 hours  melatonin 3 milliGRAM(s) Oral at bedtime    Heme:  enoxaparin Injectable 40 milliGRAM(s) SubCutaneous every 24 hours    Antibiotics:    Cardiovascular:    GI:    Endocrine:    All Other Medications:  lidocaine   4% Patch 1 Patch Transdermal daily PRN  multivitamin 1 Tablet(s) Oral daily  thiamine IVPB 500 milliGRAM(s) IV Intermittent daily      Vital Signs Last 24 Hrs  T(C): 36.8 (08 Jul 2024 12:02), Max: 36.9 (07 Jul 2024 20:30)  T(F): 98.3 (08 Jul 2024 12:02), Max: 98.5 (07 Jul 2024 20:30)  HR: 98 (08 Jul 2024 12:02) (80 - 98)  BP: 103/61 (08 Jul 2024 12:02) (93/53 - 148/69)  BP(mean): 67 (07 Jul 2024 20:30) (67 - 67)  RR: 18 (08 Jul 2024 12:02) (17 - 18)  SpO2: 96% (08 Jul 2024 12:02) (95% - 97%)    Parameters below as of 08 Jul 2024 12:02  Patient On (Oxygen Delivery Method): room air        LABS:                        11.8   8.76  )-----------( 258      ( 08 Jul 2024 06:55 )             36.8     07-08    137  |  105  |  15  ----------------------------<  96  3.6   |  23  |  1.06    Ca    8.8      08 Jul 2024 06:55  Phos  3.8     07-08  Mg     1.9     07-08        Urinalysis Basic - ( 08 Jul 2024 06:55 )    Color: x / Appearance: x / SG: x / pH: x  Gluc: 96 mg/dL / Ketone: x  / Bili: x / Urobili: x   Blood: x / Protein: x / Nitrite: x   Leuk Esterase: x / RBC: x / WBC x   Sq Epi: x / Non Sq Epi: x / Bacteria: x        RADIOLOGY:    Drug Screen:        REVIEW OF SYSTEMS:  CONSTITUTIONAL: Denies fever or fatigue   EYES: Denies eye pain, visual disturbances  HEENT: Denies difficulty hearing, throat/neck pain or stiffness  RESPIRATORY: Denies SOB, cough, wheezing  CARDIOVASCULAR: Denies chest pain, palpitations.   GASTROINTESTINAL: Endorses +flatus, BMs. Denies nausea, vomiting, abdominal or epigastric pain.   GENITOURINARY: Denies dysuria, frequency, or incontinence  NEUROLOGICAL: Endorses numbness, tingling to bilateral legs. Denies headaches, loss of strength, tremors, dizziness or lightheadedness with pain medications.   MUSCULOSKELETAL: Denies joint pain or swelling      FUNCTIONAL ASSESSMENT:  PAIN SCORE AT REST:         SCALE USED: (1-10 VNRS)  PAIN SCORE WITH ACTIVITY:         SCALE USED: (1-10 VNRS)    PAIN ASSESSMENT:  - 10/10 headache, frontal/facial pain around eyes + 10/10 back pain radiating down bilateral legs, burning to bottoms of feet, now up to neck    FOCUSED PHYSICAL EXAM  GENERAL: Laying in bed, NAD  NEURO: CN II-XII grossly intact, EOMI  PULM: unlabored  CV: Regular rate and rhythm  ABDOMEN: Soft, Nontender, Nondistended  EXTREMITIES:  2+ Peripheral Pulses, No clubbing, cyanosis, or edema  SKIN: No rashes or lesions      ASSESSMENT:  72 y/o M with PMH of seizures, osteoporosis presents to ED with weakness, poor po intake, and inability to care for self at home, admitted for safe discharge planning and PT eval.       PLAN:   - adjust tylenol to 1gm q8h  - start lyrica 25mg q8h  - adjust lidocaine patch to daily scheduled  - dc PO dilaudid  - start oxycodone 5mg q6h prn moderate-severe pain***  - monitor closely for oversedation, ensure narcan is ordered  - escalate bowel regimen as needed for bowel movement daily  ***recs not finalized***    - Bowel regimen: Senna    - Nausea ppx: Zofran as needed  - Functional Goals: Pt will get OOB with PT today. Pt will resume previous level of activity without impairment from surgery.   - Additional Consults: None recommended.   - Additional Labs/Imaging:  None recommended.     - Discharge Planning: per primary team  - Pain Management follow up plan: will continue to follow    Plan d/w Dr. Avila.     Dior Haro NP  Acute Pain Service

## 2024-07-08 NOTE — DIETITIAN INITIAL EVALUATION ADULT - PROBLEM SELECTOR PLAN 1
Patient with recent admission to [facility], discharged on 7/3 to [facility], unable to care for self including with eating/ambulating. Diagnosed with infection at the time PNA v. enterocolitis and discharged on antibiotics. Does not meet sepsis criteria on admission. Electrolytes wnl. Low concern for metabolic causes of weakness, no focal deficits on exam which makes stroke unlikely. Likely weakness 2/2 deconditioning given recent hospital stay and poor access to nutrition.   - Passed bedside dysphagia, will start regular diet  - PT consult, f/u recs.  - nutrition consult  - r/o reversible causes --> f/u B12, TSH  - UA, bladder scan due to his urinary hesitancy.   - SW consult

## 2024-07-08 NOTE — CONSULT NOTE ADULT - ASSESSMENT
74 y/o M with PMH of seizures on lamotrigine, osteoporosis on alendronate presents with weakness and inability to care for self at home. Pt was recently admitted to [facility] for enterocolitis for one week, given flagyl and ceftriaxone, and discharged on 7/3/24. Patient reported going to [facility] on 7/4 due to worsening symptoms, discharged same day on levaquin and cefpodoxime for concerns of PNA and took a few doses. Pt reported having difficult time walking secondary to generalized weakness. Neurology consulted for refractory headache with lightheadedness. Headache started a couple of months ago, dull pain around and behind his both eyes, 10/10, constantly there, not going away but not getting worse. Primary team tried multiple doses of Tylenol, Toradol, Dilaudid without improvement of headache. Physical exam unremarkable.    Recommendations  Incomplete note 72 y/o M with PMH of seizures on lamotrigine, osteoporosis on alendronate presents with weakness and inability to care for self at home. Pt was recently admitted to [facility] for enterocolitis for one week, given flagyl and ceftriaxone, and discharged on 7/3/24. Admitted to [facility] for generalized weakness and possible PNA. Neurology consulted for refractory headache with lightheadedness. Headache started a couple of months ago, dull pain around and behind his both eyes, 10/10, constantly there, not going away but not getting worse. Primary team tried multiple doses of Tylenol, Toradol, Dilaudid without improvement of headache. Physical exam unremarkable.    Recommendations  Incomplete note 72 y/o M with PMH of seizures on lamotrigine, osteoporosis on alendronate presents with weakness and inability to care for self at home. Pt was recently admitted to [facility] side for enterocolitis for one week, given flagyl and ceftriaxone, and discharged on 7/3/24. Admitted to [facility] for generalized weakness and possible PNA. Neurology consulted for refractory headache with lightheadedness. Headache started a couple of months ago, dull pain around and behind his both eyes, 10/10, constantly there, not going away but not getting worse. Primary team tried multiple doses of Tylenol, Toradol, Dilaudid without improvement of headache. Physical exam RLE ataxia.    Recommendations  Incomplete note 74 y/o M with PMH of seizures on lamotrigine, osteoporosis on alendronate presents with weakness and inability to care for self at home. Pt was recently admitted to [facility] for enterocolitis for one week, given flagyl and ceftriaxone, and discharged on 7/3/24. Admitted to [facility] for generalized weakness and possible PNA. Stroke code called overnight for transient facial droop. CT/CTA/CTP unremarkable. Stroke team following, recommended MR Brain. Neurology consulted for refractory headache with lightheadedness. Headache started a couple of months ago, dull pain around and behind his both eyes, 10/10, constantly there, not going away but not getting worse. Primary team tried multiple doses of Tylenol, Toradol, Dilaudid without improvement of headache. Physical exam RLE ataxia.    Recommendations  - Can try Fioricet 2 tabs Q6h PRN  - MR Brain Noncontrast per Stroke team    Case discussed with Dr. Vail. 74 y/o M with PMH of seizures on lamotrigine, osteoporosis on alendronate presents with weakness and inability to care for self at home. Pt was recently admitted to [facility] for enterocolitis for one week, given flagyl and ceftriaxone, and discharged on 7/3/24. Admitted to [facility] for generalized weakness and possible PNA. Stroke code called overnight for transient facial droop. CT/CTA/CTP unremarkable. Stroke team following, recommended MR Brain. Neurology consulted for refractory headache with lightheadedness. Headache started a couple of months ago, dull pain around and behind his both eyes, 10/10, constantly there, not going away but not getting worse. Primary team tried multiple doses of Tylenol, Toradol, Dilaudid without improvement of headache. Physical exam with questionable RLE ataxia.    Recommendations  - Can try Fioricet 2 tabs Q6h PRN  - MR Brain Noncontrast per Stroke team    Case discussed with Dr. Vail.

## 2024-07-08 NOTE — DIETITIAN INITIAL EVALUATION ADULT - PERTINENT LABORATORY DATA
07-08    137  |  105  |  15  ----------------------------<  96  3.6   |  23  |  1.06    Ca    8.8      08 Jul 2024 06:55  Phos  3.8     07-08  Mg     1.9     07-08    POCT Blood Glucose.: 121 mg/dL (07-08-24 @ 00:36)

## 2024-07-08 NOTE — OCCUPATIONAL THERAPY INITIAL EVALUATION ADULT - GENERAL OBSERVATIONS, REHAB EVAL
MRS 4. PT Lynn present. Patient received semisupine in bed +heplock IV, room air, NAD. Patient A&Ox4, agreeable and tolerated session fairly.

## 2024-07-08 NOTE — DIETITIAN NUTRITION RISK NOTIFICATION - TREATMENT: THE FOLLOWING DIET HAS BEEN RECOMMENDED
Continue Regular diet, recommend add Ensure Plus High Protein (350kcal, 20g protein) x1/day and [employer] Standard 1.0 (324kcal, 16g protein) x1/day to promote intake.   >>Encourage & monitor PO intake. [place] dietary preferences as able.       Continue micronutrient supplementation.   Consider add bowel regimen.

## 2024-07-08 NOTE — PROGRESS NOTE ADULT - PROBLEM SELECTOR PLAN 4
Patient with hx of osteoporosis, not currently taking meds but states he used to take alendronate ?everyday prior to onset of infection.   - PT consult  - f/u  vit D level #R/O Pneumonia  Patient with recent admission to [facility]. PNA v. enterocolitis. VSS and labs wnl on admission. CXR in ED showed small R pleural effusion. Discharged from [place] on levaquin and cefpodoxime, unclear if completed course. Given CTX and azithromycin in the ED. Pt continues to show no signs of overt infection (no cough, fever, or leukocytosis)    PLAN:  - F/u blood cultures  - F/u CRP, ESR  - Hold off abx for now, procal: 0.06.

## 2024-07-08 NOTE — DIETITIAN INITIAL EVALUATION ADULT - EDUCATION DIETARY MODIFICATIONS
Discussed energy/protein dense foods and small frequent meals to promote nutritional status and wt restoration. Educated on oral nutrition supplements available, pt requests trial Ensure, RD encouraged trial of dairy free oral nutrition supplement to promote tolerance. Discussed Meals on Wheels for home. Pt aware RD remains available for additional questions/concerns./(2) meets goals/outcomes/verbalization

## 2024-07-08 NOTE — OCCUPATIONAL THERAPY INITIAL EVALUATION ADULT - TRANSFER SAFETY CONCERNS NOTED: SIT/STAND, REHAB EVAL
Strong peripheral pulses Patient noted with forward and backwards swaying during static standing/decreased balance during turns/decreased weight-shifting ability

## 2024-07-08 NOTE — PROGRESS NOTE ADULT - PROBLEM SELECTOR PLAN 6
Pt endorses poor sleep 2/2 HA and back pain.    PLAN:  - melatonin 3mg qhs Pt endorses poor sleep 2/2 HA and back pain.    PLAN:  - Melatonin 3mg qhs

## 2024-07-08 NOTE — OCCUPATIONAL THERAPY INITIAL EVALUATION ADULT - MODIFIED CLINICAL TEST OF SENSORY INTEGRATION IN BALANCE TEST
Patient functionally side stepped to the L x 5 with Min A x 1- decreased weight shifting, step length, narrow base of support requiring min verbal cues throughout for proper positioning and safety

## 2024-07-08 NOTE — DIETITIAN INITIAL EVALUATION ADULT - PROBLEM SELECTOR PLAN 2
#R/O Pneumonia  Patient with recent admission to [facility]. PNA v. enterocolitis. VSS and labs wnl, however CXR with ?consolidation RLL. Discharged from [place] on Levaquin and cefpodoxime, unclear if completed course. Given CTX and Azithromycin in the ED. No signs of overt infection. no cough, fever, or leukocytosis.  - aspiration precautions  - f/u blood cultures   - hold off abx , procal: 0.06.  - f/u UA

## 2024-07-08 NOTE — PHYSICAL THERAPY INITIAL EVALUATION ADULT - IMPAIRMENTS FOUND, PT EVAL
aerobic capacity/endurance/fine motor/gait, locomotion, and balance/joint integrity and mobility/muscle strength/neuromotor development and sensory integration/poor safety awareness/posture

## 2024-07-08 NOTE — PROGRESS NOTE ADULT - PROBLEM SELECTOR PLAN 5
F: s/p 1L NS   E: replete as needed  N: regular diet pending dysphagia screening  DVT ppx: lovenox  Dispo: RMF, pending PT Pt had spinal surgery in 1996. Reports he has had residual back pain since surgery. Also c/o chronic neck pain.  Per pt, takes morphine po 200mg in AM, 230mg in afternoon, and 100mg QHS    - C/w lidocaine patch  - C/w current pain regimen as above Pt had spinal surgery in 1996. Reports he has had residual back pain since surgery. Also c/o chronic neck pain.  Per pt, takes morphine po 200mg in AM, 230mg in afternoon, and 100mg QHS    - C/w lidocaine patch  - C/w current pain regimen as above  - F/u pain management

## 2024-07-08 NOTE — PROGRESS NOTE ADULT - PROBLEM SELECTOR PLAN 9
F: s/p 1L NS   E: replete as needed  N: regular diet  DVT ppx: lovenox  Dispo: RMF, pending PT BMI <19   BMI: BMI (kg/m2): 18.5 (07-07-24 @ 13:24)  Affects all aspects of care.  Dose medications by weight   Nutritional supplements per nutrition consult

## 2024-07-08 NOTE — CHART NOTE - NSCHARTNOTEFT_GEN_A_CORE
Patient Demographic Information (PDI)       PDI	First Name	Last Name	Birth Date	Gender	Street Address	[facility]	Zip Code  A	Cassius Helton	1950	Male	[address] 	[facility] [place] [facility]	[address]  B	Cassius Helton	1950	Male	[address]	[address]    Prescription Information      PDI Filter:    PDI	Current Rx	Drug Type	Rx Written	Rx Dispensed	Drug	Quantity	Days Supply	Prescriber Name	Prescriber HAI #	Payment Method	Dispenser  B	N	O	02/05/2024	02/14/2024	morphine sulfate ir 30 mg tab	60	30	Emelia Richardson	[id number]	[id number]  B	N	O	02/05/2024	02/14/2024	morphine sulf er 100 mg tablet	30	30	Emelia Richardson	[id number]	[id number]  B	N	O	02/05/2024	02/14/2024	morphine sulf er 200 mg tablet	30	30	Emelia Richardson	[id number]	[id number]  B	N	O	01/03/2024	01/17/2024	morphine sulfate ir 30 mg tab	60	30	Jodie Mckinnon	[id number]	[id number]  B	N	O	01/03/2024	01/17/2024	morphine sulf er 100 mg tablet	30	30	Jodie Mckinnon	[id number]	[id number]  B	N	O	01/03/2024	01/17/2024	morphine sulf er 200 mg tablet	30	30	Jodie Mckinnon	[id number]	[id number]  B	N	O	12/18/2023	12/20/2023	morphine sulfate ir 30 mg tab	60	30	Jodie Mckinnon	[id number]	[id number]  B	N	O	12/18/2023	12/20/2023	morphine sulf er 100 mg tablet	30	30	Jodie Mckinnon	[id number]	[id number]  B	N	O	12/18/2023	12/20/2023	morphine sulf er 200 mg tablet	30	30	Jodie Mckinnon	[id number]	[id number]  B	N	O	11/14/2023	11/22/2023	morphine sulfate ir 30 mg tab	60	30	Jodie Mckinnon	[id number]	[id number]  B	N	O	11/14/2023	11/22/2023	morphine sulf er 100 mg tablet	30	30	Jodie Mckinnon	[id number]	[id number]  B	N	O	11/14/2023	11/22/2023	morphine sulf er 200 mg tablet	30	30	Jodie Mckinnon	[id number]	[id number]  B	N	O	10/24/2023	10/25/2023	morphine sulfate ir 30 mg tab	60	30	Jodie Mckinnon	[id number]	[id number]  B	N	O	10/24/2023	10/25/2023	morphine sulf er 100 mg tablet	30	30	Jodie Mckinnon	[id number]	[id number]  B	N	O	10/24/2023	10/25/2023	morphine sulf er 200 mg tablet	30	30	Jodie Mckinnon	[id number]	[facility]  A	N	O	09/19/2023	09/22/2023	morphine sulfate ir 30 mg tab	60	30	Jodie Mckinnon	[id number]	[id number]  A	N	O	09/19/2023	09/22/2023	morphine sulf er 100 mg tablet	30	30	Jodie Mckinnon	[id number]	[facility]  A	N	O	09/19/2023	09/22/2023	morphine sulf er 200 mg tablet	30	30	Jodie Mckinnon	[id number]	[id number]  B	N	O	08/18/2023	08/21/2023	morphine sulfate ir 30 mg tab	60	30	Jodie Mckinnon	[id number]	[id number]  B	N	O	08/18/2023	08/21/2023	morphine sulf er 100 mg tablet	30	30	Jodie Mckinnon	[id number]	[id number]  B	N	O	08/18/2023	08/21/2023	morphine sulf er 200 mg tablet	30	30	Jodie Mckinnon	[id number]	[facility]  B	N	O	07/19/2023	07/20/2023	morphine sulfate ir 30 mg tab	60	30	Jodie Mckinnon	[id number]	[facility]  B	N	O	07/19/2023	07/20/2023	morphine sulf er 100 mg tablet	30	30	Jodie Mckinnon	[id number]	[id number]  B	N	O	07/20/2023	07/20/2023	morphine sulf er 200 mg tablet	30	30	Jodie Mckinnon	[id number]	[id number] Prescription Information      PDI Filter:    PDI	Current Rx	Drug Type	Rx Written	Rx Dispensed	Drug	Quantity	Days Supply	Prescriber Name	Prescriber HAI #	Payment Method	Dispenser  B	N	O	02/05/2024	02/14/2024	morphine sulfate ir 30 mg tab	60	30	Emelia Richardson	[id number]	[id number]  B	N	O	02/05/2024	02/14/2024	morphine sulf er 100 mg tablet	30	30	Emelia Richardson	[id number]	[facility]  B	N	O	02/05/2024	02/14/2024	morphine sulf er 200 mg tablet	30	30	Emelia Richardson	[id number]	[facility]  B	N	O	01/03/2024	01/17/2024	morphine sulfate ir 30 mg tab	60	30	Jodie Mckinnon	[id number]	[facility]  B	N	O	01/03/2024	01/17/2024	morphine sulf er 100 mg tablet	30	30	Jodie Mckinnon	[id number]	[facility]  B	N	O	01/03/2024	01/17/2024	morphine sulf er 200 mg tablet	30	30	Jodie Mckinnon	[id number]	[facility]  B	N	O	12/18/2023	12/20/2023	morphine sulfate ir 30 mg tab	60	30	Jodie Mckinnon	[id number]	[facility]  B	N	O	12/18/2023	12/20/2023	morphine sulf er 100 mg tablet	30	30	Jodie Mckinnon	[id number]	[facility]  B	N	O	12/18/2023	12/20/2023	morphine sulf er 200 mg tablet	30	30	Jodie Mckinnon	[id number]	[facility]  B	N	O	11/14/2023	11/22/2023	morphine sulfate ir 30 mg tab	60	30	Jodie Mckinnon	[id number]	[facility]  B	N	O	11/14/2023	11/22/2023	morphine sulf er 100 mg tablet	30	30	Jodie Mckinnon	[id number]	[facility]  B	N	O	11/14/2023	11/22/2023	morphine sulf er 200 mg tablet	30	30	Jodie Mckinnon	[id number]	[facility]  B	N	O	10/24/2023	10/25/2023	morphine sulfate ir 30 mg tab	60	30	Jodie Mckinnon	[id number]	[facility]  B	N	O	10/24/2023	10/25/2023	morphine sulf er 100 mg tablet	30	30	Jodie Mckinnon	[id number]	[facility]  B	N	O	10/24/2023	10/25/2023	morphine sulf er 200 mg tablet	30	30	Jodie Mckinnon	[id number]	[facility]  A	N	O	09/19/2023	09/22/2023	morphine sulfate ir 30 mg tab	60	30	Jodie Mckinnon	[id number]	[facility]  A	N	O	09/19/2023	09/22/2023	morphine sulf er 100 mg tablet	30	30	Jodie Mckinnon	[id number]	[facility]  A	N	O	09/19/2023	09/22/2023	morphine sulf er 200 mg tablet	30	30	Jodie Mckinnon	[id number]	[facility]  B	N	O	08/18/2023	08/21/2023	morphine sulfate ir 30 mg tab	60	30	Jodie Mckinnon	[id number]	[facility]  B	N	O	08/18/2023	08/21/2023	morphine sulf er 100 mg tablet	30	30	Jodie Mckinnon	[id number]	[facility]  B	N	O	08/18/2023	08/21/2023	morphine sulf er 200 mg tablet	30	30	Jodie Mckinnon	[id number]	[facility]  B	N	O	07/19/2023	07/20/2023	morphine sulfate ir 30 mg tab	60	30	Jodie Mckinnon	[id number]	[facility]  B	N	O	07/19/2023	07/20/2023	morphine sulf er 100 mg tablet	30	30	Jodie Mckinnon	[id number]	[facility]  B	N	O	07/20/2023	07/20/2023	morphine sulf er 200 mg tablet	30	30	Jodie Mckinnon	[id number]	[id number]

## 2024-07-08 NOTE — DIETITIAN INITIAL EVALUATION ADULT - PERTINENT MEDS FT
MEDICATIONS  (STANDING):  enoxaparin Injectable 40 milliGRAM(s) SubCutaneous every 24 hours  lamoTRIgine 100 milliGRAM(s) Oral every 12 hours  melatonin 3 milliGRAM(s) Oral at bedtime  multivitamin 1 Tablet(s) Oral daily  thiamine IVPB 500 milliGRAM(s) IV Intermittent daily    MEDICATIONS  (PRN):  acetaminophen     Tablet .. 650 milliGRAM(s) Oral every 6 hours PRN Temp greater or equal to 38C (100.4F), Mild Pain (1 - 3)  HYDROmorphone   Tablet 2 milliGRAM(s) Oral every 6 hours PRN Severe Pain (7 - 10)  lidocaine   4% Patch 1 Patch Transdermal daily PRN pain

## 2024-07-08 NOTE — DIETITIAN NUTRITION RISK NOTIFICATION - ADDITIONAL COMMENTS/DIETITIAN RECOMMENDATIONS
Pt reports poor PO intake x1 month PTA due to decreased mobility and therefore difficulty grocery shopping and preparing meals. Diet recall includes little to no intake, occasional frozen meals. Nutrition-focused physical examination notable for moderate-severe muscle and fat wasting. Per ASPEN guidelines, pt meets criteria for severe malnutrition.

## 2024-07-08 NOTE — CONSULT NOTE ADULT - CONSULT REASON
PM&R consult
hx chronic pain
transient L facial weakness, H/A not relieved by NSAIDs
Refractory headache

## 2024-07-08 NOTE — CONSULT NOTE ADULT - REASON FOR ADMISSION
inability to care for self

## 2024-07-08 NOTE — DIETITIAN INITIAL EVALUATION ADULT - ADD RECOMMEND
1. Continue Regular diet, recommend add Ensure Plus High Protein (350kcal, 20g protein) x1/day and [employer] Standard 1.0 (324kcal, 16g protein) x1/day to promote intake.   >>Encourage & monitor PO intake. [place] dietary preferences as able.   2. Continue micronutrient supplementation.   3. Monitor GI tolerance, weight trends, labs, & skin integrity.  4. Defer bowel and pain regimens to team.   >>Consider add bowel regimen, pt reports last BM x4 days ago.   5. RD to remain available for diet education/intervention prn.

## 2024-07-08 NOTE — CONSULT NOTE ADULT - ASSESSMENT
73y Male with PMHx of seizure disorder on Lamotrigine (stated last seizure was in the 1970s, doesn't remember what his seizures consisted of), cervical spinal stenosis with chronic back pain on PO Morphine, OA, osteoporosis, BPH, glaucoma, former smoker/EtOH use, L intertrochanteric fx with c/c/b provoked LLE DVT s/p 3 months of Eliquis, recent hospitalization at [facility] for enterocolitis (discharged 7/4), who was admitted to [facility] for failure to thrive and possible RLL PNA. Stroke code was called for headache not relieved by NSAIDs and transient L facial weakness. SBP at time of the code was in the low 100s. NIHSS of 2 for R sided ataxia. CTH negative, CTA H/N without high grade stenoses, patent sinuses. CTP negative. Not a candidate for TNK as patient with chronic dizziness/unsteady gait over the last several months and transient L facial weakness had resolved. Not a candidate for thrombectomy as patient without LVO.    Plan:   - Patient with cerebellar signs on neurologic assessment, would obtain MRI brain short stroke protocol to make sure difficulty ambulating over the last several months isn't due to a possible stroke in the posterior circulation   - Can hold off on antiplatelet and statin for now pending MRI results as patient without intra/extracranial stenoses   - F/u B12 level   - Stroke to follow. Rest of care per primary team     Discussed with Stroke Attending, Dr. Herrera

## 2024-07-08 NOTE — PROGRESS NOTE ADULT - PROBLEM SELECTOR PLAN 7
Patient with hx of seizures, reportedly on lamotrigine 100mg bid, unable to verify recent prescriptions on [employer].     PLAN:  - C/w home lamotrigine 100mg  - F/u lamotrigine levels  - Seizure precautions  - [facility]

## 2024-07-09 ENCOUNTER — TRANSCRIPTION ENCOUNTER (OUTPATIENT)
Age: 74
End: 2024-07-09

## 2024-07-09 DIAGNOSIS — K59.00 CONSTIPATION, UNSPECIFIED: ICD-10-CM

## 2024-07-09 DIAGNOSIS — N40.0 BENIGN PROSTATIC HYPERPLASIA WITHOUT LOWER URINARY TRACT SYMPTOMS: ICD-10-CM

## 2024-07-09 LAB
ANION GAP SERPL CALC-SCNC: 13 MMOL/L — SIGNIFICANT CHANGE UP (ref 5–17)
BASOPHILS # BLD AUTO: 0.05 K/UL — SIGNIFICANT CHANGE UP (ref 0–0.2)
BASOPHILS NFR BLD AUTO: 0.7 % — SIGNIFICANT CHANGE UP (ref 0–2)
BUN SERPL-MCNC: 13 MG/DL — SIGNIFICANT CHANGE UP (ref 7–23)
CALCIUM SERPL-MCNC: 9.3 MG/DL — SIGNIFICANT CHANGE UP (ref 8.4–10.5)
CHLORIDE SERPL-SCNC: 103 MMOL/L — SIGNIFICANT CHANGE UP (ref 96–108)
CO2 SERPL-SCNC: 23 MMOL/L — SIGNIFICANT CHANGE UP (ref 22–31)
CREAT SERPL-MCNC: 0.67 MG/DL — SIGNIFICANT CHANGE UP (ref 0.5–1.3)
CRP SERPL-MCNC: 3.4 MG/L — SIGNIFICANT CHANGE UP (ref 0–4)
CYSTATIN C SERPL-MCNC: 0.92 MG/L — SIGNIFICANT CHANGE UP (ref 0.82–1.52)
EGFR: 100 ML/MIN/1.73M2 — SIGNIFICANT CHANGE UP
EOSINOPHIL # BLD AUTO: 0.36 K/UL — SIGNIFICANT CHANGE UP (ref 0–0.5)
EOSINOPHIL NFR BLD AUTO: 4.7 % — SIGNIFICANT CHANGE UP (ref 0–6)
ERYTHROCYTE [SEDIMENTATION RATE] IN BLOOD: 10 MM/HR — SIGNIFICANT CHANGE UP
FOLATE SERPL-MCNC: 16.4 NG/ML — SIGNIFICANT CHANGE UP
GFR/BSA.PRED SERPLBLD CYS-BASED-ARV: 83 ML/MIN/1.73M2 — SIGNIFICANT CHANGE UP
GLUCOSE SERPL-MCNC: 98 MG/DL — SIGNIFICANT CHANGE UP (ref 70–99)
HCT VFR BLD CALC: 36.6 % — LOW (ref 39–50)
HGB BLD-MCNC: 12.3 G/DL — LOW (ref 13–17)
IMM GRANULOCYTES NFR BLD AUTO: 0.4 % — SIGNIFICANT CHANGE UP (ref 0–0.9)
LYMPHOCYTES # BLD AUTO: 1.08 K/UL — SIGNIFICANT CHANGE UP (ref 1–3.3)
LYMPHOCYTES # BLD AUTO: 14.2 % — SIGNIFICANT CHANGE UP (ref 13–44)
MAGNESIUM SERPL-MCNC: 1.8 MG/DL — SIGNIFICANT CHANGE UP (ref 1.6–2.6)
MCHC RBC-ENTMCNC: 31.8 PG — SIGNIFICANT CHANGE UP (ref 27–34)
MCHC RBC-ENTMCNC: 33.6 GM/DL — SIGNIFICANT CHANGE UP (ref 32–36)
MCV RBC AUTO: 94.6 FL — SIGNIFICANT CHANGE UP (ref 80–100)
MONOCYTES # BLD AUTO: 0.58 K/UL — SIGNIFICANT CHANGE UP (ref 0–0.9)
MONOCYTES NFR BLD AUTO: 7.6 % — SIGNIFICANT CHANGE UP (ref 2–14)
NEUTROPHILS # BLD AUTO: 5.5 K/UL — SIGNIFICANT CHANGE UP (ref 1.8–7.4)
NEUTROPHILS NFR BLD AUTO: 72.4 % — SIGNIFICANT CHANGE UP (ref 43–77)
NRBC # BLD: 0 /100 WBCS — SIGNIFICANT CHANGE UP (ref 0–0)
NT-PROBNP SERPL-SCNC: 162 PG/ML — SIGNIFICANT CHANGE UP (ref 0–300)
PHOSPHATE SERPL-MCNC: 2.7 MG/DL — SIGNIFICANT CHANGE UP (ref 2.5–4.5)
PLATELET # BLD AUTO: 247 K/UL — SIGNIFICANT CHANGE UP (ref 150–400)
POTASSIUM SERPL-MCNC: 3.6 MMOL/L — SIGNIFICANT CHANGE UP (ref 3.5–5.3)
POTASSIUM SERPL-SCNC: 3.6 MMOL/L — SIGNIFICANT CHANGE UP (ref 3.5–5.3)
RBC # BLD: 3.87 M/UL — LOW (ref 4.2–5.8)
RBC # FLD: 14.2 % — SIGNIFICANT CHANGE UP (ref 10.3–14.5)
SODIUM SERPL-SCNC: 139 MMOL/L — SIGNIFICANT CHANGE UP (ref 135–145)
T PALLIDUM AB TITR SER: NEGATIVE — SIGNIFICANT CHANGE UP
WBC # BLD: 7.6 K/UL — SIGNIFICANT CHANGE UP (ref 3.8–10.5)
WBC # FLD AUTO: 7.6 K/UL — SIGNIFICANT CHANGE UP (ref 3.8–10.5)

## 2024-07-09 PROCEDURE — 99233 SBSQ HOSP IP/OBS HIGH 50: CPT | Mod: GC

## 2024-07-09 PROCEDURE — 99222 1ST HOSP IP/OBS MODERATE 55: CPT

## 2024-07-09 PROCEDURE — 70551 MRI BRAIN STEM W/O DYE: CPT | Mod: 26

## 2024-07-09 RX ORDER — PREGABALIN 50 MG/1
50 CAPSULE ORAL EVERY 8 HOURS
Refills: 0 | Status: DISCONTINUED | OUTPATIENT
Start: 2024-07-09 | End: 2024-07-10

## 2024-07-09 RX ORDER — OXYCODONE HYDROCHLORIDE 100 MG/5ML
5 SOLUTION ORAL EVERY 8 HOURS
Refills: 0 | Status: DISCONTINUED | OUTPATIENT
Start: 2024-07-09 | End: 2024-07-10

## 2024-07-09 RX ORDER — LIDOCAINE HCL 28 MG/G
1 GEL TOPICAL
Qty: 0 | Refills: 0 | DISCHARGE
Start: 2024-07-09

## 2024-07-09 RX ORDER — POLYETHYLENE GLYCOL 3350 1 G/G
17 POWDER ORAL DAILY
Refills: 0 | Status: DISCONTINUED | OUTPATIENT
Start: 2024-07-09 | End: 2024-07-09

## 2024-07-09 RX ORDER — ACETAMINOPHEN 325 MG
2 TABLET ORAL
Qty: 0 | Refills: 0 | DISCHARGE
Start: 2024-07-09

## 2024-07-09 RX ORDER — LAMOTRIGINE 100 MG/1
1 TABLET ORAL
Qty: 0 | Refills: 0 | DISCHARGE
Start: 2024-07-09

## 2024-07-09 RX ORDER — OXYCODONE HYDROCHLORIDE 100 MG/5ML
1 SOLUTION ORAL
Qty: 0 | Refills: 0 | DISCHARGE
Start: 2024-07-09

## 2024-07-09 RX ORDER — PREGABALIN 50 MG/1
1 CAPSULE ORAL
Qty: 0 | Refills: 0 | DISCHARGE
Start: 2024-07-09

## 2024-07-09 RX ORDER — SENNOSIDES 8.6 MG
2 TABLET ORAL AT BEDTIME
Refills: 0 | Status: DISCONTINUED | OUTPATIENT
Start: 2024-07-09 | End: 2024-07-10

## 2024-07-09 RX ADMIN — LIDOCAINE HCL 1 PATCH: 28 GEL TOPICAL at 23:00

## 2024-07-09 RX ADMIN — LIDOCAINE HCL 1 PATCH: 28 GEL TOPICAL at 11:10

## 2024-07-09 RX ADMIN — ENOXAPARIN SODIUM 40 MILLIGRAM(S): 100 INJECTION SUBCUTANEOUS at 22:43

## 2024-07-09 RX ADMIN — OXYCODONE HYDROCHLORIDE 5 MILLIGRAM(S): 100 SOLUTION ORAL at 06:24

## 2024-07-09 RX ADMIN — OXYCODONE HYDROCHLORIDE 5 MILLIGRAM(S): 100 SOLUTION ORAL at 23:38

## 2024-07-09 RX ADMIN — LAMOTRIGINE 100 MILLIGRAM(S): 100 TABLET ORAL at 09:00

## 2024-07-09 RX ADMIN — PREGABALIN 25 MILLIGRAM(S): 50 CAPSULE ORAL at 13:06

## 2024-07-09 RX ADMIN — Medication 3 MILLIGRAM(S): at 22:42

## 2024-07-09 RX ADMIN — OXYCODONE HYDROCHLORIDE 5 MILLIGRAM(S): 100 SOLUTION ORAL at 13:06

## 2024-07-09 RX ADMIN — LAMOTRIGINE 100 MILLIGRAM(S): 100 TABLET ORAL at 22:42

## 2024-07-09 RX ADMIN — PREGABALIN 50 MILLIGRAM(S): 50 CAPSULE ORAL at 22:42

## 2024-07-09 RX ADMIN — Medication 1000 MILLIGRAM(S): at 13:06

## 2024-07-09 RX ADMIN — LIDOCAINE HCL 1 PATCH: 28 GEL TOPICAL at 19:25

## 2024-07-09 RX ADMIN — TAMSULOSIN HYDROCHLORIDE 0.4 MILLIGRAM(S): 0.4 CAPSULE ORAL at 22:42

## 2024-07-09 RX ADMIN — Medication 1 TABLET(S): at 11:10

## 2024-07-09 RX ADMIN — OXYCODONE HYDROCHLORIDE 5 MILLIGRAM(S): 100 SOLUTION ORAL at 22:44

## 2024-07-09 RX ADMIN — Medication 105 MILLIGRAM(S): at 11:10

## 2024-07-09 RX ADMIN — Medication 1000 MILLIGRAM(S): at 22:43

## 2024-07-09 RX ADMIN — PREGABALIN 25 MILLIGRAM(S): 50 CAPSULE ORAL at 06:24

## 2024-07-09 RX ADMIN — OXYCODONE HYDROCHLORIDE 5 MILLIGRAM(S): 100 SOLUTION ORAL at 14:00

## 2024-07-09 RX ADMIN — Medication 1000 MILLIGRAM(S): at 14:00

## 2024-07-09 RX ADMIN — Medication 1000 MILLIGRAM(S): at 23:38

## 2024-07-09 RX ADMIN — OXYCODONE HYDROCHLORIDE 5 MILLIGRAM(S): 100 SOLUTION ORAL at 07:16

## 2024-07-09 NOTE — PROGRESS NOTE ADULT - PROBLEM SELECTOR PLAN 12
F: s/p 1L NS   E: replete as needed  N: regular diet plus Ensure and [employer]  DVT ppx: lovenox  Dispo: RMF, pending PT

## 2024-07-09 NOTE — PROGRESS NOTE ADULT - PROBLEM SELECTOR PLAN 4
#R/O Pneumonia  Patient with recent admission to [facility]. PNA v. enterocolitis. VSS and labs wnl on admission. CXR in ED showed small R pleural effusion. Discharged from [place] on levaquin and cefpodoxime, unclear if completed course. Given CTX and azithromycin in the ED. Pt continues to show no signs of overt infection (no cough, fever, or leukocytosis)    PLAN:  - F/u blood cultures  - F/u CRP, ESR  - Hold off abx for now, procal: 0.06. Pt had spinal surgery in 1996. Reports he has had residual back pain since surgery. Also c/o chronic neck pain.  Per pt, takes morphine po 200mg in AM, 230mg in afternoon, and 100mg QHS    - C/w lidocaine patch  - C/w current pain regimen as above  - F/u pain management  - C/w tylenol 1gm q8hr standing, lyrica 50mg q8hr, lidocaine patch daily  - Start ibuprofen 400mg q7hr prn for moderate pain  - Increase oxycodone 5mg to q8hr for severe pain Pt had spinal surgery in 1996. Reports he has had residual back pain since surgery. Also c/o chronic neck pain.  Per pt, takes morphine po 200mg in AM, 230mg in afternoon, and 100mg QHS, last RX in February per [facility]    - C/w lidocaine patch  - F/u pain management  - C/w tylenol 1gm q8hr standing, lyrica 50mg q8hr, lidocaine patch daily  - Start ibuprofen 400mg q7hr prn for moderate pain  - Increase oxycodone 5mg to q8hr for severe pain

## 2024-07-09 NOTE — DISCHARGE NOTE PROVIDER - DETAILS OF MALNUTRITION DIAGNOSIS/DIAGNOSES
This patient has been assessed with a concern for Malnutrition and was treated during this hospitalization for the following Nutrition diagnosis/diagnoses:     -  07/08/2024: Severe protein-calorie malnutrition   -  07/08/2024: Underweight (BMI < 19)

## 2024-07-09 NOTE — PROGRESS NOTE ADULT - ASSESSMENT
74 y/o M with PMH of seizures on lamotrigine, osteoporosis on alendronate presents with weakness and inability to care for self at home. Pt was recently admitted to [facility] Morning side for enterocolitis for one week, given flagyl and ceftriaxone, and discharged on 7/3/24. Admitted to [facility] for generalized weakness and possible PNA. Stroke code called overnight for transient facial droop. CT/CTA/CTP unremarkable. Stroke team following, recommended MR Brain. Neurology consulted for refractory headache with lightheadedness. Headache started a couple of months ago, dull pain around and behind his both eyes, 10/10, constantly there, not going away but not getting worse. Primary team tried multiple doses of Tylenol, Toradol, Dilaudid without improvement of headache. Physical exam RLE ataxia, LE weakness. No sinus tenderness but b/l temporal tenderness noted. Temporal arteritis less likely given normal ESR/CRP.    Recommendations  - Fioricet 2 tabs Q6h PRN  - MR Brain Noncontrast per Stroke team    Case discussed with Dr. Vail.

## 2024-07-09 NOTE — DISCHARGE NOTE PROVIDER - NSDCMRMEDTOKEN_GEN_ALL_CORE_FT
acetaminophen 500 mg oral tablet: 2 tab(s) orally every 8 hours  calcium (as carbonate) 500 mg oral tablet: 2 tab(s) orally 2 times a day  lamoTRIgine 100 mg oral tablet: 1 tab(s) orally every 12 hours  lidocaine 4% topical film: Apply topically to affected area once a day  melatonin 3 mg oral tablet: 1 tab(s) orally once a day (at bedtime)  Multiple Vitamins oral tablet: 1 tab(s) orally once a day  oxyCODONE 5 mg oral tablet: 1 tab(s) orally every 6 hours As needed Moderate Pain (4 - 6)  pregabalin 25 mg oral capsule: 1 cap(s) orally every 8 hours  tamsulosin 0.4 mg oral capsule: 2 cap(s) orally once a day   Detail Level: Detailed Quality 431: Preventive Care And Screening: Unhealthy Alcohol Use - Screening: Patient not identified as an unhealthy alcohol user when screened for unhealthy alcohol use using a systematic screening method Quality 226: Preventive Care And Screening: Tobacco Use: Screening And Cessation Intervention: Patient screened for tobacco use and is an ex/non-smoker acetaminophen 500 mg oral tablet: 2 tab(s) orally every 8 hours  calcium (as carbonate) 500 mg oral tablet: 2 tab(s) orally 2 times a day  ibuprofen 400 mg oral tablet: 1 tab(s) orally every 8 hours As needed Moderate Pain (4 - 6)  lamoTRIgine 100 mg oral tablet: 1 tab(s) orally every 12 hours  lidocaine 4% topical film: Apply topically to affected area once a day  melatonin 3 mg oral tablet: 1 tab(s) orally once a day (at bedtime)  Multiple Vitamins oral tablet: 1 tab(s) orally once a day  oxyCODONE 5 mg oral tablet: 1 tab(s) orally every 6 hours As needed Moderate Pain (4 - 6)  pregabalin 50 mg oral capsule: 1 cap(s) orally every 8 hours  senna leaf extract oral tablet: 2 tab(s) orally once a day (at bedtime)  tamsulosin 0.4 mg oral capsule: 2 cap(s) orally once a day

## 2024-07-09 NOTE — PROGRESS NOTE ADULT - SUBJECTIVE AND OBJECTIVE BOX
PAIN MANAGEMENT CONSULT NOTE    Interval Events:  -       Since yesterday 6am, pt has required:  -     HOME MEDICATIONS:   Please refer to initial HNP    Allergies    No Known Allergies    Intolerances        PAIN MEDICATIONS:  acetaminophen     Tablet .. 1000 milliGRAM(s) Oral every 8 hours  lamoTRIgine 100 milliGRAM(s) Oral every 12 hours  melatonin 3 milliGRAM(s) Oral at bedtime  oxyCODONE    IR 5 milliGRAM(s) Oral every 6 hours PRN  pregabalin 25 milliGRAM(s) Oral every 8 hours    Heme:  enoxaparin Injectable 40 milliGRAM(s) SubCutaneous every 24 hours    Antibiotics:    Cardiovascular:    GI:    Endocrine:    All Other Medications:  lidocaine   4% Patch 1 Patch Transdermal daily  multivitamin 1 Tablet(s) Oral daily  tamsulosin 0.4 milliGRAM(s) Oral at bedtime  thiamine IVPB 500 milliGRAM(s) IV Intermittent daily      Vital Signs Last 24 Hrs  T(C): 36.9 (09 Jul 2024 06:26), Max: 37.1 (08 Jul 2024 20:22)  T(F): 98.4 (09 Jul 2024 06:26), Max: 98.7 (08 Jul 2024 20:22)  HR: 82 (09 Jul 2024 06:26) (82 - 98)  BP: 97/62 (09 Jul 2024 06:26) (97/62 - 148/69)  BP(mean): --  RR: 16 (09 Jul 2024 06:26) (16 - 18)  SpO2: 93% (09 Jul 2024 06:26) (93% - 97%)    Parameters below as of 09 Jul 2024 06:26  Patient On (Oxygen Delivery Method): room air        LABS:                        12.3   7.60  )-----------( 247      ( 09 Jul 2024 05:30 )             36.6     07-09    139  |  103  |  13  ----------------------------<  98  3.6   |  23  |  0.67    Ca    9.3      09 Jul 2024 05:30  Phos  2.7     07-09  Mg     1.8     07-09    TPro  6.9  /  Alb  3.9  /  TBili  0.2  /  DBili  x   /  AST  14  /  ALT  23  /  AlkPhos  80  07-08      Urinalysis Basic - ( 09 Jul 2024 05:30 )    Color: x / Appearance: x / SG: x / pH: x  Gluc: 98 mg/dL / Ketone: x  / Bili: x / Urobili: x   Blood: x / Protein: x / Nitrite: x   Leuk Esterase: x / RBC: x / WBC x   Sq Epi: x / Non Sq Epi: x / Bacteria: x        RADIOLOGY:    Drug Screen:        REVIEW OF SYSTEMS:  CONSTITUTIONAL: Denies fever or fatigue   EYES: Denies eye pain, visual disturbances  HEENT: Denies difficulty hearing, throat/neck pain or stiffness  RESPIRATORY: Denies SOB, cough, wheezing  CARDIOVASCULAR: Denies chest pain, palpitations.   GASTROINTESTINAL: Endorses +flatus, BMs. Denies nausea, vomiting, abdominal or epigastric pain.   GENITOURINARY: Denies dysuria, frequency, or incontinence  NEUROLOGICAL: Endorses *** numbness, tingling. Denies headaches, loss of strength, tremors, dizziness or lightheadedness with pain medications.   MUSCULOSKELETAL: Denies joint pain or swelling      FUNCTIONAL ASSESSMENT:  PAIN SCORE AT REST:         SCALE USED: (1-10 VNRS)  PAIN SCORE WITH ACTIVITY:         SCALE USED: (1-10 VNRS)    PAIN ASSESSMENT:    FOCUSED PHYSICAL EXAM  GENERAL: Laying in bed, NAD  NEURO: CN II-XII grossly intact, EOMI  PULM: unlabored  CV: Regular rate and rhythm  ABDOMEN: Soft, Nontender, Nondistended  EXTREMITIES:  2+ Peripheral Pulses, No clubbing, cyanosis, or edema  SKIN: No rashes or lesions      ASSESSMENT:  72 y/o M with PMH of seizures, osteoporosis presents to ED with weakness, poor po intake, and inability to care for self at home, admitted for safe discharge planning and PT eval.       PLAN:   - continue tylenol to 1gm q8h  - continue lyrica 25mg q8h  - continue lidocaine patch daily  - oxycodone 5mg q6h prn moderate-severe pain***  - monitor closely for oversedation, ensure narcan is ordered  - escalate bowel regimen as needed for bowel movement daily  ***recs not finalized***    - Bowel regimen: Senna    - Nausea ppx: Zofran as needed  - Functional Goals: Pt will get OOB with PT today. Pt will resume previous level of activity without impairment from surgery.   - Additional Consults: None recommended.   - Additional Labs/Imaging:  None recommended.     - Discharge Planning: per primary team  - Pain Management follow up plan: will continue to follow    Plan d/w Dr. Avila.     Dior Haro NP  Acute Pain Service PAIN MANAGEMENT CONSULT NOTE    Interval Events:  - NAEON  - pt reports improved sleep      Since yesterday 6am, pt has required:  - oxycodone 5mg x 2    HOME MEDICATIONS:   Please refer to initial HNP    Allergies    No Known Allergies    Intolerances        PAIN MEDICATIONS:  acetaminophen     Tablet .. 1000 milliGRAM(s) Oral every 8 hours  lamoTRIgine 100 milliGRAM(s) Oral every 12 hours  melatonin 3 milliGRAM(s) Oral at bedtime  oxyCODONE    IR 5 milliGRAM(s) Oral every 6 hours PRN  pregabalin 25 milliGRAM(s) Oral every 8 hours    Heme:  enoxaparin Injectable 40 milliGRAM(s) SubCutaneous every 24 hours    Antibiotics:    Cardiovascular:    GI:    Endocrine:    All Other Medications:  lidocaine   4% Patch 1 Patch Transdermal daily  multivitamin 1 Tablet(s) Oral daily  tamsulosin 0.4 milliGRAM(s) Oral at bedtime  thiamine IVPB 500 milliGRAM(s) IV Intermittent daily      Vital Signs Last 24 Hrs  T(C): 36.9 (09 Jul 2024 06:26), Max: 37.1 (08 Jul 2024 20:22)  T(F): 98.4 (09 Jul 2024 06:26), Max: 98.7 (08 Jul 2024 20:22)  HR: 82 (09 Jul 2024 06:26) (82 - 98)  BP: 97/62 (09 Jul 2024 06:26) (97/62 - 148/69)  BP(mean): --  RR: 16 (09 Jul 2024 06:26) (16 - 18)  SpO2: 93% (09 Jul 2024 06:26) (93% - 97%)    Parameters below as of 09 Jul 2024 06:26  Patient On (Oxygen Delivery Method): room air        LABS:                        12.3   7.60  )-----------( 247      ( 09 Jul 2024 05:30 )             36.6     07-09    139  |  103  |  13  ----------------------------<  98  3.6   |  23  |  0.67    Ca    9.3      09 Jul 2024 05:30  Phos  2.7     07-09  Mg     1.8     07-09    TPro  6.9  /  Alb  3.9  /  TBili  0.2  /  DBili  x   /  AST  14  /  ALT  23  /  AlkPhos  80  07-08      Urinalysis Basic - ( 09 Jul 2024 05:30 )    Color: x / Appearance: x / SG: x / pH: x  Gluc: 98 mg/dL / Ketone: x  / Bili: x / Urobili: x   Blood: x / Protein: x / Nitrite: x   Leuk Esterase: x / RBC: x / WBC x   Sq Epi: x / Non Sq Epi: x / Bacteria: x        RADIOLOGY:    Drug Screen:        REVIEW OF SYSTEMS:  CONSTITUTIONAL: Denies fever or fatigue   EYES: Denies eye pain, visual disturbances  HEENT: Denies difficulty hearing, throat/neck pain or stiffness  RESPIRATORY: Denies SOB, cough, wheezing  CARDIOVASCULAR: Denies chest pain, palpitations.   GASTROINTESTINAL: Endorses +flatus, BMs. Denies nausea, vomiting, abdominal or epigastric pain.   GENITOURINARY: Denies dysuria, frequency, or incontinence  NEUROLOGICAL: Denies new numbness, tingling. Denies headaches, loss of strength, tremors, dizziness or lightheadedness with pain medications.   MUSCULOSKELETAL: Denies joint pain or swelling      FUNCTIONAL ASSESSMENT:  PAIN SCORE AT REST:         SCALE USED: (1-10 VNRS)  PAIN SCORE WITH ACTIVITY:         SCALE USED: (1-10 VNRS)    PAIN ASSESSMENT:  - 10/10 unchanged headache, chronic back pain    FOCUSED PHYSICAL EXAM  GENERAL: Laying in bed, NAD  NEURO: CN II-XII grossly intact, EOMI  PULM: unlabored  CV: Regular rate and rhythm  ABDOMEN: Soft, Nontender, Nondistended  EXTREMITIES:  2+ Peripheral Pulses, No clubbing, cyanosis, or edema  SKIN: No rashes or lesions      ASSESSMENT:  74 y/o M with PMH of seizures, osteoporosis presents to ED with weakness, poor po intake, and inability to care for self at home, admitted for safe discharge planning and PT eval.       PLAN:   - continue tylenol 1gm q8h  - increase lyrica to 50mg q8h  - continue lidocaine patch daily  - start ibuprofen 400mg q8h prn moderate pain  - adjust oxycodone 5mg to q8h prn severe pain  - monitor closely for oversedation, ensure narcan is ordered  - escalate bowel regimen as needed for bowel movement daily      - Bowel regimen: Senna    - Nausea ppx: Zofran as needed  - Functional Goals: Pt will get OOB with PT today. Pt will resume previous level of activity without impairment from surgery.   - Additional Consults: None recommended.   - Additional Labs/Imaging:  None recommended.     - Discharge Planning: per primary team  - Pain Management follow up plan: will continue to follow    Plan d/w Dr. Avila.     Dior Haro NP  Acute Pain Service

## 2024-07-09 NOTE — DISCHARGE NOTE PROVIDER - NSDCQMSTAIRS_GEN_ALL_CORE
Reason for Call:  Other prescription    Detailed comments: Needs hidrocodone filled    Phone Number Patient can be reached at: Home number on file [phone] (home)    Best Time: any    Can we leave a detailed message on this number? YES    Call taken on 1/17/2023 at 11:45 AM by Dana Betancourt     Yes

## 2024-07-09 NOTE — PROGRESS NOTE ADULT - PROBLEM SELECTOR PLAN 11
Patient with hx of osteoporosis, not currently taking meds but states he used to take alendronate weekly prior to onset of infection. Pt was supposedly switched to forteo injection right before admission.  Vit D 34.2    - Hold outpt meds fro now as not sure what pt was using before admission

## 2024-07-09 NOTE — DISCHARGE NOTE PROVIDER - CARE PROVIDER_API CALL
Gregorio Hernandez on [address], appt for after CHRIS  Phone: [phone]  Fax: (   )    -  Established Patient  Follow Up Time:

## 2024-07-09 NOTE — PROGRESS NOTE ADULT - PROBLEM SELECTOR PLAN 10
F: s/p 1L NS   E: replete as needed  N: regular diet  DVT ppx: lovenox  Dispo: [facility], pending PT Patient with hx of seizures, reportedly on lamotrigine 100mg bid, unable to verify recent prescriptions on [facility].     PLAN:  - C/w home lamotrigine 100mg  - F/u lamotrigine levels  - Seizure precautions

## 2024-07-09 NOTE — PROGRESS NOTE ADULT - SUBJECTIVE AND OBJECTIVE BOX
OVERNIGHT EVENTS:    SUBJECTIVE / INTERVAL HPI: Patient seen and examined at bedside.       PHYSICAL EXAM:    General: NAD  HEENT: NC/AT; PERRL, anicteric sclera; MMM  Neck: supple  Cardiovascular: +S1/S2, RRR  Respiratory: CTA B/L; no W/R/R  Gastrointestinal: soft, NT/ND; +BSx4  Extremities: WWP; no edema, clubbing or cyanosis  Vascular: 2+ radial, DP/PT pulses B/L  Neurological: AAOx3; no focal deficits  Psychiatric: pleasant mood and affect  Dermatologic: no appreciable wounds or damage to the skin    VITAL SIGNS:  Vital Signs Last 24 Hrs  T(C): 37.1 (08 Jul 2024 20:22), Max: 37.1 (08 Jul 2024 20:22)  T(F): 98.7 (08 Jul 2024 20:22), Max: 98.7 (08 Jul 2024 20:22)  HR: 96 (08 Jul 2024 20:22) (86 - 98)  BP: 117/73 (08 Jul 2024 20:22) (103/61 - 148/69)  BP(mean): --  RR: 18 (08 Jul 2024 20:22) (18 - 18)  SpO2: 95% (08 Jul 2024 20:22) (95% - 97%)    Parameters below as of 08 Jul 2024 20:22  Patient On (Oxygen Delivery Method): room air          MEDICATIONS:  MEDICATIONS  (STANDING):  acetaminophen     Tablet .. 1000 milliGRAM(s) Oral every 8 hours  enoxaparin Injectable 40 milliGRAM(s) SubCutaneous every 24 hours  lamoTRIgine 100 milliGRAM(s) Oral every 12 hours  lidocaine   4% Patch 1 Patch Transdermal daily  melatonin 3 milliGRAM(s) Oral at bedtime  multivitamin 1 Tablet(s) Oral daily  pregabalin 25 milliGRAM(s) Oral every 8 hours  tamsulosin 0.4 milliGRAM(s) Oral at bedtime  thiamine IVPB 500 milliGRAM(s) IV Intermittent daily    MEDICATIONS  (PRN):  oxyCODONE    IR 5 milliGRAM(s) Oral every 6 hours PRN Moderate Pain (4 - 6)      ALLERGIES:  Allergies    No Known Allergies    Intolerances        LABS:                        11.8   8.76  )-----------( 258      ( 08 Jul 2024 06:55 )             36.8     07-08    140  |  105  |  19  ----------------------------<  103<H>  3.6   |  25  |  0.78    Ca    9.1      08 Jul 2024 14:55  Phos  3.8     07-08  Mg     1.9     07-08    TPro  6.9  /  Alb  3.9  /  TBili  0.2  /  DBili  x   /  AST  14  /  ALT  23  /  AlkPhos  80  07-08      Urinalysis Basic - ( 08 Jul 2024 14:55 )    Color: x / Appearance: x / SG: x / pH: x  Gluc: 103 mg/dL / Ketone: x  / Bili: x / Urobili: x   Blood: x / Protein: x / Nitrite: x   Leuk Esterase: x / RBC: x / WBC x   Sq Epi: x / Non Sq Epi: x / Bacteria: x      CAPILLARY BLOOD GLUCOSE      POCT Blood Glucose.: 121 mg/dL (08 Jul 2024 00:36)      RADIOLOGY & ADDITIONAL TESTS: Reviewed. OVERNIGHT EVENTS: NELSON.    SUBJECTIVE / INTERVAL HPI: Patient seen and examined at bedside. Pt resting comfortably in bed.       PHYSICAL EXAM:    General: NAD  HEENT: NC/AT; PERRL, anicteric sclera; MMM  Neck: supple  Cardiovascular: +S1/S2, RRR  Respiratory: CTA B/L; no W/R/R  Gastrointestinal: soft, NT/ND; +BSx4  Extremities: WWP; no edema, clubbing or cyanosis  Vascular: 2+ radial, DP/PT pulses B/L  Neurological: AAOx3; no focal deficits  Psychiatric: pleasant mood and affect  Dermatologic: no appreciable wounds or damage to the skin    VITAL SIGNS:  Vital Signs Last 24 Hrs  T(C): 37.1 (08 Jul 2024 20:22), Max: 37.1 (08 Jul 2024 20:22)  T(F): 98.7 (08 Jul 2024 20:22), Max: 98.7 (08 Jul 2024 20:22)  HR: 96 (08 Jul 2024 20:22) (86 - 98)  BP: 117/73 (08 Jul 2024 20:22) (103/61 - 148/69)  BP(mean): --  RR: 18 (08 Jul 2024 20:22) (18 - 18)  SpO2: 95% (08 Jul 2024 20:22) (95% - 97%)    Parameters below as of 08 Jul 2024 20:22  Patient On (Oxygen Delivery Method): room air          MEDICATIONS:  MEDICATIONS  (STANDING):  acetaminophen     Tablet .. 1000 milliGRAM(s) Oral every 8 hours  enoxaparin Injectable 40 milliGRAM(s) SubCutaneous every 24 hours  lamoTRIgine 100 milliGRAM(s) Oral every 12 hours  lidocaine   4% Patch 1 Patch Transdermal daily  melatonin 3 milliGRAM(s) Oral at bedtime  multivitamin 1 Tablet(s) Oral daily  pregabalin 25 milliGRAM(s) Oral every 8 hours  tamsulosin 0.4 milliGRAM(s) Oral at bedtime  thiamine IVPB 500 milliGRAM(s) IV Intermittent daily    MEDICATIONS  (PRN):  oxyCODONE    IR 5 milliGRAM(s) Oral every 6 hours PRN Moderate Pain (4 - 6)      ALLERGIES:  Allergies    No Known Allergies    Intolerances        LABS:                        11.8   8.76  )-----------( 258      ( 08 Jul 2024 06:55 )             36.8     07-08    140  |  105  |  19  ----------------------------<  103<H>  3.6   |  25  |  0.78    Ca    9.1      08 Jul 2024 14:55  Phos  3.8     07-08  Mg     1.9     07-08    TPro  6.9  /  Alb  3.9  /  TBili  0.2  /  DBili  x   /  AST  14  /  ALT  23  /  AlkPhos  80  07-08      Urinalysis Basic - ( 08 Jul 2024 14:55 )    Color: x / Appearance: x / SG: x / pH: x  Gluc: 103 mg/dL / Ketone: x  / Bili: x / Urobili: x   Blood: x / Protein: x / Nitrite: x   Leuk Esterase: x / RBC: x / WBC x   Sq Epi: x / Non Sq Epi: x / Bacteria: x      CAPILLARY BLOOD GLUCOSE      POCT Blood Glucose.: 121 mg/dL (08 Jul 2024 00:36)      RADIOLOGY & ADDITIONAL TESTS: Reviewed. OVERNIGHT EVENTS: NELSON.    SUBJECTIVE / INTERVAL HPI: Patient seen and examined at bedside. Pt resting comfortably in bed. States he feels a little bit better today. He endorses slightly better pain control. HA is now rated 8/10, same with back pain. He endorses sleeping well last night after the melatonin was given. Pt has not had a BM since 7/4. Denies fevers, chills, HA, chest pain, dizziness, SOB, n/v/d.      PHYSICAL EXAM:    General: NAD, frail, heavily tattooed  HEENT: NC/AT; PERRL, anicteric sclera; MMM  Neck: supple  Cardiovascular: +S1/S2, RRR  Respiratory: CTA B/L; no W/R/R  Gastrointestinal: soft, NT/ND; +BSx4  Extremities: WWP; no edema, clubbing or cyanosis  Vascular: 2+ radial, DP/PT pulses B/L  Neurological: AAOx3; no focal deficits  Psychiatric: pleasant mood and affect  Dermatologic: no appreciable wounds or damage to the skin    VITAL SIGNS:  Vital Signs Last 24 Hrs  T(C): 37.1 (08 Jul 2024 20:22), Max: 37.1 (08 Jul 2024 20:22)  T(F): 98.7 (08 Jul 2024 20:22), Max: 98.7 (08 Jul 2024 20:22)  HR: 96 (08 Jul 2024 20:22) (86 - 98)  BP: 117/73 (08 Jul 2024 20:22) (103/61 - 148/69)  BP(mean): --  RR: 18 (08 Jul 2024 20:22) (18 - 18)  SpO2: 95% (08 Jul 2024 20:22) (95% - 97%)    Parameters below as of 08 Jul 2024 20:22  Patient On (Oxygen Delivery Method): room air          MEDICATIONS:  MEDICATIONS  (STANDING):  acetaminophen     Tablet .. 1000 milliGRAM(s) Oral every 8 hours  enoxaparin Injectable 40 milliGRAM(s) SubCutaneous every 24 hours  lamoTRIgine 100 milliGRAM(s) Oral every 12 hours  lidocaine   4% Patch 1 Patch Transdermal daily  melatonin 3 milliGRAM(s) Oral at bedtime  multivitamin 1 Tablet(s) Oral daily  pregabalin 25 milliGRAM(s) Oral every 8 hours  tamsulosin 0.4 milliGRAM(s) Oral at bedtime  thiamine IVPB 500 milliGRAM(s) IV Intermittent daily    MEDICATIONS  (PRN):  oxyCODONE    IR 5 milliGRAM(s) Oral every 6 hours PRN Moderate Pain (4 - 6)      ALLERGIES:  Allergies    No Known Allergies    Intolerances        LABS:                        11.8   8.76  )-----------( 258      ( 08 Jul 2024 06:55 )             36.8     07-08    140  |  105  |  19  ----------------------------<  103<H>  3.6   |  25  |  0.78    Ca    9.1      08 Jul 2024 14:55  Phos  3.8     07-08  Mg     1.9     07-08    TPro  6.9  /  Alb  3.9  /  TBili  0.2  /  DBili  x   /  AST  14  /  ALT  23  /  AlkPhos  80  07-08      Urinalysis Basic - ( 08 Jul 2024 14:55 )    Color: x / Appearance: x / SG: x / pH: x  Gluc: 103 mg/dL / Ketone: x  / Bili: x / Urobili: x   Blood: x / Protein: x / Nitrite: x   Leuk Esterase: x / RBC: x / WBC x   Sq Epi: x / Non Sq Epi: x / Bacteria: x      CAPILLARY BLOOD GLUCOSE      POCT Blood Glucose.: 121 mg/dL (08 Jul 2024 00:36)      RADIOLOGY & ADDITIONAL TESTS: Reviewed. HOSPITAL COURSE: Pt is a 74 yo M with PMHX of seizures, osteoporosis, BPH, hx of spinal surgery, who presented with weakness, poor po intake, and inability to care for self at home, who is admitted for pain management and safe discharge planning. Pt was recently admitted to [facility] for tx of enterocolitis, d/c on 7/3/24 with flagyl and ceftriaxone. On 7/4, pt went to [facility] for worsening sxs, dx with pneumonia and started on levaquin and cefpodoxime. On 7/5, pt arrived to ED here after feeling weak and having difficulty caring for himself.     OVERNIGHT EVENTS: NELSON.    SUBJECTIVE / INTERVAL HPI: Patient seen and examined at bedside. Pt resting comfortably in bed. States he feels a little bit better today. He endorses slightly better pain control. HA is now rated 8/10, same with back pain. He endorses sleeping well last night after the melatonin was given. Pt has not had a BM since 7/4. Denies fevers, chills, HA, chest pain, dizziness, SOB, n/v/d.      PHYSICAL EXAM:    General: NAD, frail, heavily tattooed  HEENT: NC/AT; PERRL, anicteric sclera; MMM  Neck: supple  Cardiovascular: +S1/S2, RRR  Respiratory: CTA B/L; no W/R/R  Gastrointestinal: soft, NT/ND; +BSx4  Extremities: WWP; no edema, clubbing or cyanosis  Vascular: 2+ radial, DP/PT pulses B/L  Neurological: AAOx3; no focal deficits  Psychiatric: pleasant mood and affect  Dermatologic: no appreciable wounds or damage to the skin    VITAL SIGNS:  Vital Signs Last 24 Hrs  T(C): 37.1 (08 Jul 2024 20:22), Max: 37.1 (08 Jul 2024 20:22)  T(F): 98.7 (08 Jul 2024 20:22), Max: 98.7 (08 Jul 2024 20:22)  HR: 96 (08 Jul 2024 20:22) (86 - 98)  BP: 117/73 (08 Jul 2024 20:22) (103/61 - 148/69)  BP(mean): --  RR: 18 (08 Jul 2024 20:22) (18 - 18)  SpO2: 95% (08 Jul 2024 20:22) (95% - 97%)    Parameters below as of 08 Jul 2024 20:22  Patient On (Oxygen Delivery Method): room air          MEDICATIONS:  MEDICATIONS  (STANDING):  acetaminophen     Tablet .. 1000 milliGRAM(s) Oral every 8 hours  enoxaparin Injectable 40 milliGRAM(s) SubCutaneous every 24 hours  lamoTRIgine 100 milliGRAM(s) Oral every 12 hours  lidocaine   4% Patch 1 Patch Transdermal daily  melatonin 3 milliGRAM(s) Oral at bedtime  multivitamin 1 Tablet(s) Oral daily  pregabalin 25 milliGRAM(s) Oral every 8 hours  tamsulosin 0.4 milliGRAM(s) Oral at bedtime  thiamine IVPB 500 milliGRAM(s) IV Intermittent daily    MEDICATIONS  (PRN):  oxyCODONE    IR 5 milliGRAM(s) Oral every 6 hours PRN Moderate Pain (4 - 6)      ALLERGIES:  Allergies    No Known Allergies    Intolerances        LABS:                        11.8   8.76  )-----------( 258      ( 08 Jul 2024 06:55 )             36.8     07-08    140  |  105  |  19  ----------------------------<  103<H>  3.6   |  25  |  0.78    Ca    9.1      08 Jul 2024 14:55  Phos  3.8     07-08  Mg     1.9     07-08    TPro  6.9  /  Alb  3.9  /  TBili  0.2  /  DBili  x   /  AST  14  /  ALT  23  /  AlkPhos  80  07-08      Urinalysis Basic - ( 08 Jul 2024 14:55 )    Color: x / Appearance: x / SG: x / pH: x  Gluc: 103 mg/dL / Ketone: x  / Bili: x / Urobili: x   Blood: x / Protein: x / Nitrite: x   Leuk Esterase: x / RBC: x / WBC x   Sq Epi: x / Non Sq Epi: x / Bacteria: x      CAPILLARY BLOOD GLUCOSE      POCT Blood Glucose.: 121 mg/dL (08 Jul 2024 00:36)      RADIOLOGY & ADDITIONAL TESTS: Reviewed. HOSPITAL COURSE: Pt is a 74 yo M with PMHX of seizures, osteoporosis, BPH, hx of spinal surgery, who presented with weakness, poor po intake, and inability to care for self at home and is admitted for pain management and safe discharge planning. Pt was recently admitted to [facility] for tx of enterocolitis, d/c on 7/3/24 with flagyl and ceftriaxone. On 7/4, pt went to [facility] for worsening sxs, dx with pneumonia and started on levaquin and cefpodoxime. On 7/5, pt arrived to ED here after feeling weak/dizzy at home and having increasing difficulty performing ADLs, such as going to buy groceries, leading to poor po intake. Pt admitted for further management, found to have uncontrolled back pain and HAs. On 7/7, pt exhibited CN VI deficits and worsening HA, prompting stroke code. CT head was negative and sxs soon resolved. MRI head pending at this time. Pt's pain is currently managed with tylenol, lyrica, lidocaine patches, oxycodone prn. Of note, [facility] most recently rx in February. W/u for weakness pointing towards deconditioning due to recent hospital stay, poor access to nutrition, and inadequate pain control. Pt is currently being followed by pain management, neurology, PT/OT. He is pending dispo to [facility].    OVERNIGHT EVENTS: NELSON.    SUBJECTIVE / INTERVAL HPI: Patient seen and examined at bedside. Pt resting comfortably in bed. States he feels a little bit better today. He endorses slightly better pain control. HA is now rated 8/10, same with back pain. He endorses sleeping well last night after the melatonin was given. Pt has not had a BM since 7/4. Denies fevers, chills, HA, chest pain, dizziness, SOB, n/v/d.      PHYSICAL EXAM:    General: NAD, frail, heavily tattooed  HEENT: NC/AT; PERRL, anicteric sclera; MMM  Neck: supple  Cardiovascular: +S1/S2, RRR  Respiratory: CTA B/L; no W/R/R  Gastrointestinal: soft, NT/ND; +BSx4  Extremities: WWP; no edema, clubbing or cyanosis  Vascular: 2+ radial, DP/PT pulses B/L  Neurological: AAOx3; no focal deficits  Psychiatric: pleasant mood and affect  Dermatologic: no appreciable wounds or damage to the skin    VITAL SIGNS:  Vital Signs Last 24 Hrs  T(C): 37.1 (08 Jul 2024 20:22), Max: 37.1 (08 Jul 2024 20:22)  T(F): 98.7 (08 Jul 2024 20:22), Max: 98.7 (08 Jul 2024 20:22)  HR: 96 (08 Jul 2024 20:22) (86 - 98)  BP: 117/73 (08 Jul 2024 20:22) (103/61 - 148/69)  BP(mean): --  RR: 18 (08 Jul 2024 20:22) (18 - 18)  SpO2: 95% (08 Jul 2024 20:22) (95% - 97%)    Parameters below as of 08 Jul 2024 20:22  Patient On (Oxygen Delivery Method): room air          MEDICATIONS:  MEDICATIONS  (STANDING):  acetaminophen     Tablet .. 1000 milliGRAM(s) Oral every 8 hours  enoxaparin Injectable 40 milliGRAM(s) SubCutaneous every 24 hours  lamoTRIgine 100 milliGRAM(s) Oral every 12 hours  lidocaine   4% Patch 1 Patch Transdermal daily  melatonin 3 milliGRAM(s) Oral at bedtime  multivitamin 1 Tablet(s) Oral daily  pregabalin 25 milliGRAM(s) Oral every 8 hours  tamsulosin 0.4 milliGRAM(s) Oral at bedtime  thiamine IVPB 500 milliGRAM(s) IV Intermittent daily    MEDICATIONS  (PRN):  oxyCODONE    IR 5 milliGRAM(s) Oral every 6 hours PRN Moderate Pain (4 - 6)      ALLERGIES:  Allergies    No Known Allergies    Intolerances        LABS:                        11.8   8.76  )-----------( 258      ( 08 Jul 2024 06:55 )             36.8     07-08    140  |  105  |  19  ----------------------------<  103<H>  3.6   |  25  |  0.78    Ca    9.1      08 Jul 2024 14:55  Phos  3.8     07-08  Mg     1.9     07-08    TPro  6.9  /  Alb  3.9  /  TBili  0.2  /  DBili  x   /  AST  14  /  ALT  23  /  AlkPhos  80  07-08      Urinalysis Basic - ( 08 Jul 2024 14:55 )    Color: x / Appearance: x / SG: x / pH: x  Gluc: 103 mg/dL / Ketone: x  / Bili: x / Urobili: x   Blood: x / Protein: x / Nitrite: x   Leuk Esterase: x / RBC: x / WBC x   Sq Epi: x / Non Sq Epi: x / Bacteria: x      CAPILLARY BLOOD GLUCOSE      POCT Blood Glucose.: 121 mg/dL (08 Jul 2024 00:36)      RADIOLOGY & ADDITIONAL TESTS: Reviewed.

## 2024-07-09 NOTE — PROGRESS NOTE ADULT - PROBLEM SELECTOR PLAN 2
Pt reports severe HA on and off for past month. HA this AM, along with transient L sided facial droop, prompted stroke code. CT head was negative. Pt is back to baseline with no focal deficits, but still c/o HA that is refractory to toradol, ofirmev, and dilaudid.     PLAN:  - F/u MRI head  - F/u neuro recs  - C/w tylenol 650mg po q6hr for mild pain, dilaudid 2mg q6hr for severe pain  - Elevate head of bed  - Aspiration precautions Pt reports severe HA on and off for past month. HA this AM, along with transient L sided facial droop, prompted stroke code. CT head was negative. Pt is back to baseline with no focal deficits, but still c/o HA that is refractory to toradol, ofirmev, and dilaudid.   Neuro recommended fioricet prn, but held for now due to overlap with pain management recommendations    PLAN:  - F/u MRI head  - F/u neuro recs  - C/w pain regimen as recommended by pain management (see below)  - Elevate head of bed  - Aspiration precautions

## 2024-07-09 NOTE — DISCHARGE NOTE PROVIDER - ATTENDING DISCHARGE PHYSICAL EXAMINATION:
Gen: sitting upright in bed at time of exam  HEENT: MMM, clear OP  Neck: supple, trachea at midline  CV: RRR, +S1/S2  Pulm: adequate respiratory effort, no increased work of breathing  Abd: soft, NTND  Skin: warm and dry, no new rashes vs prior report  Ext: WWP, no LE edema   Neuro: AOx3, no gross focal neurological deficits  Psych: affect and behavior appropriate

## 2024-07-09 NOTE — PROGRESS NOTE ADULT - ASSESSMENT
Patient is a 72 y/o M with PMH of seizures, osteoporosis presents to ED with weakness, poor po intake, and inability to care for self at home, admitted for pain management, PT eval, and safe discharge planning.

## 2024-07-09 NOTE — PROGRESS NOTE ADULT - PROBLEM SELECTOR PLAN 5
Pt had spinal surgery in 1996. Reports he has had residual back pain since surgery. Also c/o chronic neck pain.  Per pt, takes morphine po 200mg in AM, 230mg in afternoon, and 100mg QHS    - C/w lidocaine patch  - C/w current pain regimen as above  - F/u pain management BMI <19   BMI: BMI (kg/m2): 18.5 (07-07-24 @ 13:24)  Affects all aspects of care.    - Dose medications by weight   - Nutritional supplements per nutrition consult

## 2024-07-09 NOTE — DISCHARGE NOTE PROVIDER - HOSPITAL COURSE
#Discharge: do not delete    Patient is 74 yo M with past medical history of seizures, osteoporosis, BPH, hx of spinal surgery, presented with weakness, poor po intake, and inability to care for self at home, admitted for pain management and safe discharge planning.    Hospital course (by problem):    #Generalized weakness  #Severe protein-calorie nutrition.  Patient with recent admission to [facility], discharged on 7/3 [facility], unable to care for self including with eating/ambulating, difficulty getting to the grocery store. Diagnosed with infection at the time PNA v. enterocolitis and discharged on antibiotics. Pt did not meet sepsis criteria on admission. Electrolytes wnl. Low concern for metabolic causes of weakness, no focal deficits on exam which makes stroke unlikely. BMI 18.5. TSH WNL. Vit B12 WNL. A1c 5.6%. Methylmalonic acid still pending at time of discharge. Pt received thiamine supplementation while admitted. Weakness is likely 2/2 deconditioning given recent hospital stay, poor access to nutrition, and inadequate pain control.     PLAN:  - D/c to [facility]  - C/w nutritional supplements  - C/w multivitamin    #Headache, unspecified.   Pt reports severe HA on and off for past month. CRUZ on 7/9, along with transient L sided facial droop, prompted stroke code. CT head was negative. Pt is back to baseline with no focal deficits, but still c/o HA that is refractory to toradol, ofirmev, and dilaudid. Firiocet 2 tabs q6hr prn was recommended by neurology, but not started while in patient due to pain management recommendations for tylenol 1gm q8hr standing (see below). Due to difficulty ambulating over past few months with some cerebllar signs rpesent on exam, Neurology also recommended MRI head per stroke protocol, but was never completed while admitted, as MRI kept getting pushed from schedule.    PLAN:  - F/u neurology outpt  - Elevate head of bed  - Aspiration precautions     #COLIN (acute kidney injury).   Cr on admission = 0.73. Cr 7/8 = 1.06. Given change of 0.3 within 48 hours, pt met criteria for COLIN. Cr back to normal at 0.67.    PLAN:  - Continue to monitor urine output    # Recent hx of pneumonia   Patient with [facility]. PNA v. enterocolitis. VSS and labs wnl on admission. CXR in ED showed small R pleural effusion. Discharged from [place] on levaquin and cefpodoxime, unclear if completed course. Given CTX and azithromycin in the ED. Pt continues to show no signs of overt infection (no cough, fever, or leukocytosis) with lungs CTA b/l, no w/r/r. Procal 0.06, CRP 3.4, ESR 10. Antibiotics held while admitted.    PLAN:  - Blood cultures NGTD  - Continue to monitor for any signs of infection.    # Back pain with history of spinal surgery.   Pt had spinal surgery in 1996. Reports he has had residual back pain since surgery. Also c/o chronic neck pain. Pain is rated as very severe.  Per pt, takes morphine po 200mg in AM, 230mg in afternoon, and 100mg QHS    - C/w tylenol 1gm q8hr, lyrica 25mg q8hr, oxycodone 5mg q6hr prn for moderate to severe pain  - C/w lidocaine patch  - F/u pain management outpt    #Benign prostatic hypertrophy  Pt has hx of urinary  takes tamsulosin 0.4mg 2tabs qhs at home. While admitted, decreased to 0.4mg 1 tab qhs. Pt also describing urinary hesitancy.  He was receiving bladder scans q6hr while admitted.     PLAN:  - C/w tamsulosin  - Continue to monitor urine output    #Insomnia   Pt endorsed poor sleep 2/2 HA and back pain, improved with melatonin.    PLAN:  - C/w melatonin 3mg qhs.    #Seizure disorder.   Patient with hx of seizures, on lamotrigine 100mg bid. Lamotrigine levels pending at discharge.    PLAN:  - C/w home lamotrigine 100mg  - Seizure precautions    #Osteoporosis.   Patient with hx of osteoporosis, not currently taking meds but states he used to take alendronate once a week prior to onset of infection. Pt was supposedly switched to forteo daily injection right before admission. Meds were held while admitted. Vit D 34.2.    PLAN:  - Resume home meds    Patient was discharged to: [facility]    New medications:   Changes to old medications:  Medications that were stopped:    Items to follow up as outpatient:    Physical exam at the time of discharge:  GENERAL: NAD, lying in bed comfortably  HEAD:  Atraumatic, normocephalic  EYES: EOMI, PERRL  NECK: Supple, trachea midline, no JVD  HEART: Regular rate and rhythm  LUNGS: Unlabored respirations.  Clear to auscultation bilaterally, no crackles, wheezing, or rhonchi     #Discharge: do not delete    Patient is 72 yo M with past medical history of seizures, osteoporosis, BPH, hx of spinal surgery, presented with weakness, poor po intake, and inability to care for self at home, admitted for pain management and safe discharge planning.    Hospital course (by problem):    #Generalized weakness  #Severe protein-calorie nutrition.  Patient with recent admission to [facility], discharged on 7/3 to [facility], unable to care for self including with eating/ambulating, difficulty getting to the grocery store. Diagnosed with infection at the time PNA v. enterocolitis and discharged on antibiotics. Pt did not meet sepsis criteria on admission. Electrolytes wnl. Low concern for metabolic causes of weakness, no focal deficits on exam which makes stroke unlikely. BMI 18.5. TSH WNL. Vit B12 WNL. A1c 5.6%. Methylmalonic acid still pending at time of discharge. Pt received thiamine supplementation while admitted. Weakness is likely 2/2 deconditioning given recent hospital stay, poor access to nutrition, and inadequate pain control.     PLAN:  - D/c to [facility]  - C/w nutritional supplements  - C/w multivitamin    #Headache, unspecified.   Pt reports severe HA on and off for past month. CRUZ on 7/9, along with transient L sided facial droop, prompted stroke code. CT head was negative. Pt is back to baseline with no focal deficits, but still c/o HA that is refractory to toradol, ofirmev, and dilaudid. Firiocet 2 tabs q6hr prn was recommended by neurology, but not started while in patient due to pain management recommendations for tylenol 1gm q8hr standing (see below). Due to difficulty ambulating over past few months with some cerebllar signs rpesent on exam, Neurology also recommended MRI head per stroke protocol, but was never completed while admitted, as MRI kept getting pushed from schedule.    PLAN:  - F/u neurology outpt  - Elevate head of bed  - Aspiration precautions     #COLIN (acute kidney injury).   Cr on admission = 0.73. Cr 7/8 = 1.06. Given change of 0.3 within 48 hours, pt met criteria for COLIN. Cr back to normal at 0.67.    PLAN:  - Continue to monitor urine output    # Recent hx of pneumonia   Patient with recent admission to [facility]. PNA v. enterocolitis. VSS and labs wnl on admission. CXR in ED showed small R pleural effusion. Discharged from [place] on levaquin and cefpodoxime, unclear if completed course. Given CTX and azithromycin in the ED. Pt continues to show no signs of overt infection (no cough, fever, or leukocytosis) with lungs CTA b/l, no w/r/r. Procal 0.06, CRP 3.4, ESR 10. Antibiotics held while admitted.    PLAN:  - Blood cultures NGTD  - Continue to monitor for any signs of infection.    # Back pain with history of spinal surgery.   Pt had spinal surgery in 1996. Reports he has had residual back pain since surgery. Also c/o chronic neck pain. Pain is rated as very severe.  Per pt, takes morphine po 200mg in AM, 230mg in afternoon, and 100mg QHS    - C/w tylenol 1gm q8hr, lyrica 25mg q8hr, oxycodone 5mg q6hr prn for moderate to severe pain  - C/w lidocaine patch  - F/u pain management outpt    #Benign prostatic hypertrophy  Pt has hx of urinary  takes tamsulosin 0.4mg 2tabs qhs at home. While admitted, decreased to 0.4mg 1 tab qhs. Pt also describing urinary hesitancy.  He was receiving bladder scans q6hr while admitted.     PLAN:  - C/w tamsulosin  - Continue to monitor urine output    #Insomnia   Pt endorsed poor sleep 2/2 HA and back pain, improved with melatonin.    PLAN:  - C/w melatonin 3mg qhs.    #Seizure disorder.   Patient with hx of seizures, on lamotrigine 100mg bid. Lamotrigine levels pending at discharge.    PLAN:  - C/w home lamotrigine 100mg  - Seizure precautions    #Osteoporosis.   Patient with hx of osteoporosis, not currently taking meds but states he used to take alendronate once a week prior to onset of infection. Pt was supposedly switched to forteo daily injection right before admission. Meds were held while admitted. Vit D 34.2.    PLAN:  - Resume home meds    Patient was discharged to: [facility]    New medications: tylenol 1gm q8hr, lyrica 25mg q8hr, oxycodone 5mg q6hr prn, melatonin, lidocaine patch  Changes to old medications:  Medications that were stopped:    Items to follow up as outpatient:    Physical exam at the time of discharge:  GENERAL: NAD, lying in bed comfortably  HEAD:  Atraumatic, normocephalic  EYES: EOMI, PERRL  NECK: Supple, trachea midline, no JVD  HEART: Regular rate and rhythm  LUNGS: Unlabored respirations.  Clear to auscultation bilaterally, no crackles, wheezing, or rhonchi     #Discharge: do not delete    Patient is 74 yo M with past medical history of seizures, osteoporosis, BPH, hx of spinal surgery, presented with weakness, poor po intake, and inability to care for self at home, admitted for pain management and safe discharge planning.    Hospital course (by problem):    #Generalized weakness  #Severe protein-calorie nutrition.  Patient with recent admission to [facility], discharged on 7/3 to [facility], unable to care for self including with eating/ambulating, difficulty getting to the grocery store. Diagnosed with infection at the time PNA v. enterocolitis and discharged on antibiotics. Pt did not meet sepsis criteria on admission. Electrolytes wnl. Low concern for metabolic causes of weakness, no focal deficits on exam which makes stroke unlikely. BMI 18.5. TSH WNL. Vit B12 WNL. A1c 5.6%. Methylmalonic acid, folate still pending at time of discharge. Pt received thiamine supplementation while admitted. Weakness is likely 2/2 deconditioning given recent hospital stay, poor access to nutrition, and inadequate pain control.   PLAN:  - D/c to [facility]  - C/w nutritional supplements  - C/w multivitamin    #Headache, unspecified.   Pt reports severe HA on and off for past month. CRUZ on 7/9, along with transient L sided facial droop, prompted stroke code. CT head was negative. Pt is back to baseline with no focal deficits, but still c/o HA that is refractory to toradol, ofirmev, and dilaudid. Firiocet 2 tabs q6hr prn was recommended by neurology, but not started while in patient due to pain management recommendations for tylenol 1gm q8hr standing (see below). Due to difficulty ambulating over past few months with some cerebllar signs rpesent on exam, Neurology also recommended MRI head per stroke protocol, but was never completed while admitted, as MRI kept getting pushed from schedule.  PLAN:  - F/u with PCP  - Elevate head of bed  - Aspiration precautions     #COLIN (acute kidney injury).   Cr on admission = 0.73. Cr 7/8 = 1.06. Given change of 0.3 within 48 hours, pt met criteria for COLIN. Cr back to normal at 0.67.  PLAN:  - Continue to monitor urine output    # Recent hx of pneumonia   Patient with recent admission to [facility]. PNA v. enterocolitis. VSS and labs wnl on admission. CXR in ED showed small R pleural effusion. Discharged from [place] on levaquin and cefpodoxime, unclear if completed course. Given CTX and azithromycin in the ED. Pt continues to show no signs of overt infection (no cough, fever, or leukocytosis) with lungs CTA b/l, no w/r/r. Procal 0.06, CRP 3.4, ESR 10. Antibiotics held while admitted.  PLAN:  - Blood cultures NGTD  - Continue to monitor for any signs of infection.    # Back pain with history of spinal surgery.   Pt had spinal surgery in 1996. Reports he has had residual back pain since surgery. Also c/o chronic neck pain. Pain is rated as very severe.  Per pt, takes morphine po 200mg in AM, 230mg in afternoon, and 100mg QHS  - C/w tylenol 1gm q8hr, lyrica 25mg q8hr, oxycodone 5mg q6hr prn for moderate to severe pain  - C/w lidocaine patch  - F/u with PCP    #Benign prostatic hypertrophy  Pt has hx of urinary  takes tamsulosin 0.4mg 2tabs qhs at home. While admitted, decreased to 0.4mg 1 tab qhs. Pt also describing urinary hesitancy.  He was receiving bladder scans q6hr while admitted.   PLAN:  - C/w tamsulosin  - Continue to monitor urine output    #Insomnia   Pt endorsed poor sleep 2/2 HA and back pain, improved with melatonin.  PLAN:  - C/w melatonin 3mg qhs.    #Seizure disorder.   Patient with hx of seizures, on lamotrigine 100mg bid. Lamotrigine levels pending at discharge.  PLAN:  - C/w home lamotrigine 100mg  - Seizure precautions    #Osteoporosis.   Patient with hx of osteoporosis, not currently taking meds but states he used to take alendronate once a week prior to onset of infection. Pt was supposedly switched to forteo daily injection right before admission. Meds were held while admitted. Vit D 34.2.  PLAN:  - Resume home meds    Patient was discharged to: [facility]    New medications: tylenol 1gm q8hr, lyrica 25mg q8hr, oxycodone 5mg q6hr prn, melatonin, lidocaine patch  Changes to old medications: None.  Medications that were stopped: None.    Items to follow up as outpatient:    Physical exam at the time of discharge:  General: NAD, frail, heavily tattooed  HEENT: NC/AT; PERRL, anicteric sclera; MMM  Neck: supple  Cardiovascular: +S1/S2, RRR  Respiratory: CTA B/L; no W/R/R  Gastrointestinal: soft, NT/ND; +BSx4  Extremities: WWP; no edema, clubbing or cyanosis  Vascular: 2+ radial, DP/PT pulses B/L  Neurological: AAOx3; no focal deficits  Psychiatric: pleasant mood and affect  Dermatologic: no appreciable wounds or damage to the skin   #Discharge: do not delete    Patient is 72 yo M with past medical history of seizures, osteoporosis, BPH, hx of spinal surgery, presented with weakness, poor po intake, and inability to care for self at home, admitted for pain management and safe discharge planning.    Hospital course (by problem):    #Generalized weakness  #Severe protein-calorie nutrition.  Patient with recent admission to [facility], discharged on 7/3 to [facility], unable to care for self including with eating/ambulating, difficulty getting to the grocery store. Diagnosed with infection at the time PNA v. enterocolitis and discharged on antibiotics. Pt did not meet sepsis criteria on admission. Electrolytes wnl. Low concern for metabolic causes of weakness, no focal deficits on exam which makes stroke unlikely. BMI 18.5. TSH WNL. Vit B12 WNL. A1c 5.6%. Methylmalonic acid, folate still pending at time of discharge. Pt received thiamine supplementation while admitted. Weakness is likely 2/2 deconditioning given recent hospital stay, poor access to nutrition, and inadequate pain control.   PLAN:  - D/c to [facility]  - C/w nutritional supplements  - C/w multivitamin    #Headache, unspecified.   Pt reports severe HA on and off for past month. CRUZ on 7/9, along with transient L sided facial droop, prompted stroke code. CT head was negative. Pt is back to baseline with no focal deficits, but still c/o HA that is refractory to toradol, ofirmev, and dilaudid. Firiocet 2 tabs q6hr prn was recommended by neurology, but not started while in patient due to pain management recommendations for tylenol 1gm q8hr standing (see below). Due to difficulty ambulating over past few months with some cerebllar signs rpesent on exam, Neurology also recommended MRI head per stroke protocol, but was never completed while admitted, as MRI kept getting pushed from schedule.  PLAN:  - F/u with PCP  - Elevate head of bed  - Aspiration precautions  - MRI head negative, stroke team recommended outpatient neuro follow-up. Discuss with PCP  - Trialed fioricet prior to discharge, if successful can discuss continuing this during outpatient follow-up.      #COLIN (acute kidney injury).   Cr on admission = 0.73. Cr 7/8 = 1.06. Given change of 0.3 within 48 hours, pt met criteria for COLIN. Cr back to normal at 0.67.  PLAN:  - Continue to monitor urine output    # Recent hx of pneumonia   Patient with recent admission to [facility]. PNA v. enterocolitis. VSS and labs wnl on admission. CXR in ED showed small R pleural effusion. Discharged from [place] on levaquin and cefpodoxime, unclear if completed course. Given CTX and azithromycin in the ED. Pt continues to show no signs of overt infection (no cough, fever, or leukocytosis) with lungs CTA b/l, no w/r/r. Procal 0.06, CRP 3.4, ESR 10. Antibiotics held while admitted.  PLAN:  - Blood cultures NGTD  - Continue to monitor for any signs of infection.    # Back pain with history of spinal surgery.   Pt had spinal surgery in 1996. Reports he has had residual back pain since surgery. Also c/o chronic neck pain. Pain is rated as very severe.  Per pt, takes morphine po 200mg in AM, 230mg in afternoon, and 100mg QHS  - C/w tylenol 1gm q8hr, lyrica 25mg q8hr, oxycodone 5mg q6hr prn for moderate to severe pain  - C/w lidocaine patch  - F/u with PCP    #Benign prostatic hypertrophy  Pt has hx of urinary  takes tamsulosin 0.4mg 2tabs qhs at home. While admitted, decreased to 0.4mg 1 tab qhs. Pt also describing urinary hesitancy.  He was receiving bladder scans q6hr while admitted.   PLAN:  - C/w tamsulosin  - Continue to monitor urine output    #Insomnia   Pt endorsed poor sleep 2/2 HA and back pain, improved with melatonin.  PLAN:  - C/w melatonin 3mg qhs.    #Seizure disorder.   Patient with hx of seizures, on lamotrigine 100mg bid. Lamotrigine levels pending at discharge.  PLAN:  - C/w home lamotrigine 100mg  - Seizure precautions    #Osteoporosis.   Patient with hx of osteoporosis, not currently taking meds but states he used to take alendronate once a week prior to onset of infection. Pt was supposedly switched to forteo daily injection right before admission. Meds were held while admitted. Vit D 34.2.  PLAN:  - Resume home meds    Patient was discharged to: [facility]    New medications: tylenol 1gm q8hr, lyrica 25mg q8hr, oxycodone 5mg q6hr prn, melatonin, lidocaine patch  Changes to old medications: None.  Medications that were stopped: None.    Items to follow up as outpatient:    Physical exam at the time of discharge:  General: NAD, frail, heavily tattooed  HEENT: NC/AT; PERRL, anicteric sclera; MMM  Neck: supple  Cardiovascular: +S1/S2, RRR  Respiratory: CTA B/L; no W/R/R  Gastrointestinal: soft, NT/ND; +BSx4  Extremities: WWP; no edema, clubbing or cyanosis  Vascular: 2+ radial, DP/PT pulses B/L  Neurological: AAOx3; no focal deficits  Psychiatric: pleasant mood and affect  Dermatologic: no appreciable wounds or damage to the skin   #Discharge: do not delete    Patient is 74 yo M with past medical history of seizures, osteoporosis, BPH, hx of spinal surgery, presented with weakness, poor po intake, and inability to care for self at home, admitted for pain management and safe discharge planning.    Hospital course (by problem):    #Generalized weakness  #Severe protein-calorie nutrition.  Patient with recent admission to [facility], discharged on 7/3 to [facility], unable to care for self including with eating/ambulating, difficulty getting to the grocery store. Diagnosed with infection at the time PNA v. enterocolitis and discharged on antibiotics. Pt did not meet sepsis criteria on admission. Electrolytes wnl. Low concern for metabolic causes of weakness, no focal deficits on exam which makes stroke unlikely. BMI 18.5. TSH WNL. Vit B12 WNL. A1c 5.6%. Methylmalonic acid, folate still pending at time of discharge. Pt received thiamine supplementation while admitted. Weakness is likely 2/2 deconditioning given recent hospital stay, poor access to nutrition, and inadequate pain control.   PLAN:  - D/c to [facility]  - C/w nutritional supplements  - C/w multivitamin    #Headache, unspecified.   Pt reports severe HA on and off for past month. CRUZ on 7/9, along with transient L sided facial droop, prompted stroke code. CT head was negative. Pt is back to baseline with no focal deficits, but still c/o HA that is refractory to toradol, ofirmev, and dilaudid. Firiocet 2 tabs q6hr prn was recommended by neurology, but not started while in patient due to pain management recommendations for tylenol 1gm q8hr standing (see below). Due to difficulty ambulating over past few months with some cerebllar signs rpesent on exam, Neurology also recommended MRI head per stroke protocol, but was never completed while admitted, as MRI kept getting pushed from schedule.  PLAN:  - F/u with PCP  - Elevate head of bed  - Aspiration precautions  - MRI head negative, stroke team recommended outpatient neuro follow-up for headache/cervical spinal stenosis. Discuss with PCP  - Trialed fioricet prior to discharge, if successful can discuss continuing this during outpatient follow-up.      #COLIN (acute kidney injury).   Cr on admission = 0.73. Cr 7/8 = 1.06. Given change of 0.3 within 48 hours, pt met criteria for COLIN. Cr back to normal at 0.67.  PLAN:  - Continue to monitor urine output    # Recent hx of pneumonia   Patient with recent admission to [facility]. PNA v. enterocolitis. VSS and labs wnl on admission. CXR in ED showed small R pleural effusion. Discharged from [place] on levaquin and cefpodoxime, unclear if completed course. Given CTX and azithromycin in the ED. Pt continues to show no signs of overt infection (no cough, fever, or leukocytosis) with lungs CTA b/l, no w/r/r. Procal 0.06, CRP 3.4, ESR 10. Antibiotics held while admitted.  PLAN:  - Blood cultures NGTD  - Continue to monitor for any signs of infection.    # Back pain with history of spinal surgery.   Pt had spinal surgery in 1996. Reports he has had residual back pain since surgery. Also c/o chronic neck pain. Pain is rated as very severe.  Per pt, takes morphine po 200mg in AM, 230mg in afternoon, and 100mg QHS  - C/w tylenol 1gm q8hr, lyrica 25mg q8hr, oxycodone 5mg q6hr prn for moderate to severe pain  - C/w lidocaine patch  - F/u with PCP    #Benign prostatic hypertrophy  Pt has hx of urinary  takes tamsulosin 0.4mg 2tabs qhs at home. While admitted, decreased to 0.4mg 1 tab qhs. Pt also describing urinary hesitancy.  He was receiving bladder scans q6hr while admitted.   PLAN:  - C/w tamsulosin  - Continue to monitor urine output    #Insomnia   Pt endorsed poor sleep 2/2 HA and back pain, improved with melatonin.  PLAN:  - C/w melatonin 3mg qhs.    #Seizure disorder.   Patient with hx of seizures, on lamotrigine 100mg bid. Lamotrigine levels pending at discharge.  PLAN:  - C/w home lamotrigine 100mg  - Seizure precautions    #Osteoporosis.   Patient with hx of osteoporosis, not currently taking meds but states he used to take alendronate once a week prior to onset of infection. Pt was supposedly switched to forteo daily injection right before admission. Meds were held while admitted. Vit D 34.2.  PLAN:  - Resume home meds    Patient was discharged to: [facility]    New medications: tylenol 1gm q8hr, lyrica 25mg q8hr, oxycodone 5mg q6hr prn, melatonin, lidocaine patch  Changes to old medications: None.  Medications that were stopped: None.    Items to follow up as outpatient:    Physical exam at the time of discharge:  General: NAD, frail, heavily tattooed  HEENT: NC/AT; PERRL, anicteric sclera; MMM  Neck: supple  Cardiovascular: +S1/S2, RRR  Respiratory: CTA B/L; no W/R/R  Gastrointestinal: soft, NT/ND; +BSx4  Extremities: WWP; no edema, clubbing or cyanosis  Vascular: 2+ radial, DP/PT pulses B/L  Neurological: AAOx3; no focal deficits  Psychiatric: pleasant mood and affect  Dermatologic: no appreciable wounds or damage to the skin   #Discharge: do not delete    Patient is 74 yo M with past medical history of seizures, osteoporosis, BPH, hx of spinal surgery, presented with weakness, poor po intake, and inability to care for self at home, admitted for pain management and safe discharge planning.    Hospital course (by problem):    #Generalized weakness  #Severe protein-calorie nutrition.  Patient with recent admission to [facility], discharged on 7/3 [facility], unable to care for self including with eating/ambulating, difficulty getting to the grocery store. Diagnosed with infection at the time PNA v. enterocolitis and discharged on antibiotics. Pt did not meet sepsis criteria on admission. Electrolytes wnl. Low concern for metabolic causes of weakness, no focal deficits on exam which makes stroke unlikely. BMI 18.5. TSH WNL. Vit B12 WNL. A1c 5.6%. Methylmalonic acid, folate still pending at time of discharge. Pt received thiamine supplementation while admitted. Weakness is likely 2/2 deconditioning given recent hospital stay, poor access to nutrition, and inadequate pain control.   PLAN:  - D/c to [facility]  - C/w nutritional supplements  - C/w multivitamin    #Headache, unspecified.   Pt reports severe HA on and off for past month. CRUZ on 7/9, along with transient L sided facial droop, prompted stroke code. CT head was negative. Pt is back to baseline with no focal deficits, but still c/o HA that is refractory to toradol, ofirmev, and dilaudid. Firiocet 2 tabs q6hr prn was recommended by neurology, but not started while in patient due to pain management recommendations for tylenol 1gm q8hr standing (see below). Due to difficulty ambulating over past few months with some cerebllar signs rpesent on exam, Neurology also recommended MRI head per stroke protocol, but was never completed while admitted, as MRI kept getting pushed from schedule.  PLAN:  - F/u with PCP  - Elevate head of bed  - Aspiration precautions  - MRI head negative, stroke team recommended outpatient neuro follow-up for headache/cervical spinal stenosis. Discuss with PCP  - Trialed fioricet prior to discharge, if successful can discuss continuing this during outpatient follow-up.      #COLIN (acute kidney injury).   Cr on admission = 0.73. Cr 7/8 = 1.06. Given change of 0.3 within 48 hours, pt met criteria for COLIN. Cr back to normal at 0.67.  PLAN:  - Continue to monitor urine output    # Recent hx of pneumonia   Patient with recent [facility] to [facility] PNA v. enterocolitis. VSS and labs wnl on admission. CXR in ED showed small R pleural effusion. Discharged from [facility] on levaquin and cefpodoxime, unclear if completed course. Given CTX and azithromycin in the ED. Pt continues to show no signs of overt infection (no cough, fever, or leukocytosis) with lungs CTA b/l, no w/r/r. Procal 0.06, CRP 3.4, ESR 10. Antibiotics held while admitted.  PLAN:  - Blood cultures NGTD  - Continue to monitor for any signs of infection.    # Back pain with history of spinal surgery.   Pt had spinal surgery in 1996. Reports he has had residual back pain since surgery. Also c/o chronic neck pain. Pain is rated as very severe.  Per pt, takes morphine po 200mg in AM, 230mg in afternoon, and 100mg QHS  - C/w tylenol 1gm q8hr, lyrica 50mg q8hr, oxycodone 5mg q6hr prn for moderate to severe pain  - C/w lidocaine patch  - F/u with PCP    #Benign prostatic hypertrophy  Pt has hx of urinary  takes tamsulosin 0.4mg 2tabs qhs at home. While admitted, decreased to 0.4mg 1 tab qhs. Pt also describing urinary hesitancy.  He was receiving bladder scans q6hr while admitted.   PLAN:  - C/w tamsulosin  - Continue to monitor urine output    #Insomnia   Pt endorsed poor sleep 2/2 HA and back pain, improved with melatonin.  PLAN:  - C/w melatonin 3mg qhs.    #Seizure disorder.   Patient with hx of seizures, on lamotrigine 100mg bid. Lamotrigine levels pending at discharge.  PLAN:  - C/w home lamotrigine 100mg  - Seizure precautions    #Osteoporosis.   Patient with hx of osteoporosis, not currently taking meds but states he used to take alendronate once a week prior to onset of infection. Pt was supposedly switched to forteo daily injection right before admission. Meds were held while admitted. Vit D 34.2.  PLAN:  - Resume home meds    Patient was discharged to: [facility], [place]    New medications: tylenol 1gm q8hr, lyrica 25mg q8hr, oxycodone 5mg q6hr prn, melatonin, lidocaine patch  Changes to old medications: None.  Medications that were stopped: None.    Items to follow up as outpatient:    Physical exam at the time of discharge:  General: NAD, frail, heavily tattooed  HEENT: NC/AT; PERRL, anicteric sclera; MMM  Neck: supple  Cardiovascular: +S1/S2, RRR  Respiratory: CTA B/L; no W/R/R  Gastrointestinal: soft, NT/ND; +BSx4  Extremities: WWP; no edema, clubbing or cyanosis  Vascular: 2+ radial, DP/PT pulses B/L  Neurological: AAOx3; no focal deficits  Psychiatric: pleasant mood and affect  Dermatologic: no appreciable wounds or damage to the skin   Patient is 74 yo M with past medical history of seizures, osteoporosis, BPH, hx of spinal surgery, presented with weakness, poor po intake, and inability to care for self at home, admitted for pain management and safe discharge planning.    Hospital course (by problem):    #Generalized weakness  #Severe protein-calorie nutrition.  Patient with recent admission to [facility], discharged on 7/3 [facility], unable to care for self including with eating/ambulating, difficulty getting to the grocery store. Diagnosed with infection at the time PNA v. enterocolitis and discharged on antibiotics. Pt did not meet sepsis criteria on admission. Electrolytes wnl. Low concern for metabolic causes of weakness, no focal deficits on exam which makes stroke unlikely. BMI 18.5. TSH WNL. Vit B12 WNL. A1c 5.6%. Methylmalonic acid, folate still pending at time of discharge. Pt received thiamine supplementation while admitted. Weakness is likely 2/2 deconditioning given recent hospital stay, poor access to nutrition, and inadequate pain control.   PLAN:  - D/c to [facility]  - C/w nutritional supplements  - C/w multivitamin    #Headache, unspecified.   Pt reports severe HA on and off for past month. CRUZ on 7/9, along with transient L sided facial droop, prompted stroke code. CT head was negative. Pt is back to baseline with no focal deficits, but still c/o HA that is refractory to toradol, ofirmev, and dilaudid. Due to difficulty ambulating over past few months with some cerebellar signs present on exam, got MRI head - no stroke on MRI   PLAN:  - F/u with PCP  - Elevate head of bed  - Aspiration precautions  - MRI head negative, stroke team recommended outpatient neuro follow-up for headache/cervical spinal stenosis. Discuss with PCP  - Trialed fioricet while inpatient, however, had minimal response to fioricet if successful can discuss continuing this during outpatient follow-up.   - [facility] ([facility], [facility]; was told in the past that they were monitoring his symptoms to determine if surgery was indicated. )      #COLIN (acute kidney injury).   Cr on admission = 0.73. Cr 7/8 = 1.06. Given change of 0.3 within 48 hours, pt met criteria for COLIN. Cr back to normal at 0.67.  PLAN:  - Continue to monitor urine output    # Recent hx of pneumonia   [facility] with [facility] PNA v. enterocolitis. VSS and labs wnl on admission. CXR in ED showed small R pleural effusion. Discharged from [place] on levaquin and cefpodoxime, unclear if completed course. Given CTX and azithromycin in the ED. Pt continues to show no signs of overt infection (no cough, fever, or leukocytosis) with lungs CTA b/l, no w/r/r. Procal 0.06, CRP 3.4, ESR 10. Antibiotics held while admitted.  PLAN:  - Blood cultures NGTD  - Continue to monitor for any signs of infection.    # Back pain with history of spinal surgery.   Pt had spinal surgery in 1996. Reports he has had residual back pain since surgery. Also c/o chronic neck pain. Pain is rated as very severe.  Per pt, takes morphine po 200mg in AM, 230mg in afternoon, and 100mg QHS  - C/w tylenol 1gm q8hr, lyrica 50mg q8hr, oxycodone 5mg q6hr prn for moderate to severe pain  - C/w lidocaine patch  - F/u with PCP  - [facility] with [facility]     #Benign prostatic hypertrophy  Pt has hx of urinary  takes tamsulosin 0.4mg 2tabs qhs at home. high dose alpha blocker may have contributed to lightheadedness.   While admitted, decreased to 0.4mg 1 tab qhs. Pt also describing urinary hesitancy.  He was receiving bladder scans q6hr while admitted.   PLAN:  - C/w tamsulosin 0.4mg qHS ;   - Continue to monitor urine output    #Insomnia   Pt endorsed poor sleep 2/2 HA and back pain, improved with melatonin.  PLAN:  - C/w melatonin 3mg qhs.    #Seizure disorder.   Patient with hx of seizures, on lamotrigine 100mg bid. Lamotrigine levels pending at discharge.  PLAN:  - C/w home lamotrigine 100mg  - Seizure precautions    #Osteoporosis.   Patient with hx of osteoporosis, not currently taking meds but states he used to take alendronate once a week prior to onset of infection. Pt was supposedly switched to forteo daily injection right before admission. Meds were held while admitted. Vit D 34.2.  PLAN:  - Resume home meds    Patient was discharged to: [facility]    New medications: tylenol 1gm q8hr, lyrica 25mg q8hr, oxycodone 5mg q6hr prn, melatonin, lidocaine patch  Changes to old medications: None.  Medications that were stopped: None.    Items to follow up as outpatient:    Physical exam at the time of discharge:  General: NAD, frail, heavily tattooed  HEENT: NC/AT; PERRL, anicteric sclera; MMM  Neck: supple  Cardiovascular: +S1/S2, RRR  Respiratory: CTA B/L; no W/R/R  Gastrointestinal: soft, NT/ND; +BSx4  Extremities: WWP; no edema, clubbing or cyanosis  Vascular: 2+ radial, DP/PT pulses B/L  Neurological: AAOx3; no focal deficits  Psychiatric: pleasant mood and affect  Dermatologic: no appreciable wounds or damage to the skin       Patient is 74 yo M with past medical history of seizures, osteoporosis, BPH, hx of spinal surgery, presented with weakness, poor po intake, and inability to care for self at home, admitted for pain management and safe discharge planning.    Hospital course (by problem):  #Generalized weakness  #Severe protein-calorie nutrition.  Patient with recent admission to [facility], discharged on 7/3 to [facility], unable to care for self including with eating/ambulating, difficulty getting to the grocery store. Diagnosed with infection at the time PNA v. enterocolitis and discharged on antibiotics. Pt did not meet sepsis criteria on admission. Electrolytes wnl. Low concern for metabolic causes of weakness, no focal deficits on exam which makes stroke unlikely. BMI 18.5. TSH WNL. Vit B12 WNL. A1c 5.6%. Methylmalonic acid, folate still pending at time of discharge. Pt received thiamine supplementation while admitted. Weakness is likely 2/2 deconditioning given recent hospital stay, poor access to nutrition, and inadequate pain control.   PLAN:  - D/c to [facility] - C/w nutritional supplements - C/w multivitamin    #Headache, unspecified.   Pt reports severe HA on and off for past month. CRUZ on 7/9, along with transient L sided facial droop, prompted stroke code. CT head was negative. Pt is back to baseline with no focal deficits, but still c/o HA that is refractory to toradol, ofirmev, and dilaudid. Due to difficulty ambulating over past few months with some cerebellar signs present on exam, got MRI head - no stroke on MRI   PLAN:  - F/u with PCP   - MRI head negative, stroke team recommended outpatient neuro follow-up for headache/cervical spinal stenosis. Discuss with PCP  - Trialed fioricet while inpatient, however, had minimal response to fioricet if successful can discuss continuing this during outpatient follow-up.   - follow [facility] with [facility] ([facility], [facility]; was told in the past that they were monitoring his symptoms to determine if surgery was indicated. )      #COLIN (acute kidney injury).   Cr on admission = 0.73. Cr 7/8 = 1.06. Given change of 0.3 within 48 hours, pt met criteria for COLIN. Cr back to normal at 0.67. ---Resolved    # Recent hx of pneumonia   Patient with recent admission to [facility]. PNA v. enterocolitis. VSS and labs wnl on admission. CXR in ED showed small R pleural effusion. Discharged from [place] on levaquin and cefpodoxime, unclear if completed course. Given CTX and azithromycin in the ED. Pt continues to show no signs of overt infection (no cough, fever, or leukocytosis) with lungs CTA b/l, no w/r/r. Procal 0.06, CRP 3.4, ESR 10. Antibiotics held while admitted. Remained afebrile, without leukocytosis, inflammatory markers low   PLAN: - Blood cultures NGTD  - No signs of pneumonia this admission     # Back pain with history of spinal surgery.   Pt had spinal surgery in 1996. Reports he has had residual back pain since surgery. Also c/o chronic neck pain. Pain is rated as very severe.  Per pt, takes morphine po 200mg in AM, 230mg in afternoon, and 100mg QHS  - C/w tylenol 1gm q8hr, lyrica 50mg q8hr, oxycodone 5mg q6hr prn for moderate to severe pain  - C/w lidocaine patch  - F/u with PCP  - [facility]     #Benign prostatic hypertrophy  Pt has hx of urinary  takes tamsulosin 0.4mg 2tabs qhs at home. high dose alpha blocker may have contributed to lightheadedness.   While admitted, decreased to 0.4mg 1 tab qhs. Pt also describing urinary hesitancy.  He was receiving bladder scans q6hr while admitted.   PLAN:  - C/w tamsulosin 0.4mg qHS ;     #Insomnia   Pt endorsed poor sleep 2/2 HA and back pain, improved with melatonin.  PLAN:  - C/w melatonin 3mg qhs.    #Seizure disorder.   Patient with hx of seizures, on lamotrigine 100mg bid. Lamotrigine levels pending at discharge.  PLAN:  - C/w home lamotrigine 100mg  - Seizure precautions    #Osteoporosis.   Patient with hx of osteoporosis, not currently taking meds but states he used to take alendronate once a week prior to onset of infection. Pt was supposedly switched to forteo daily injection right before admission. Meds were held while admitted. Vit D 34.2.  PLAN:  - Resume home meds ; f/u outpatient     Patient was discharged to: [facility]    New medications: tylenol 1gm q8hr, lyrica 25mg q8hr, oxycodone 5mg q6hr prn, melatonin, lidocaine patch  Changes to old medications: None.  Medications that were stopped: None.    Items to follow up as outpatient:    Physical exam at the time of discharge:  General: NAD, frail, heavily tattooed  HEENT: NC/AT; PERRL, anicteric sclera; MMM  Neck: supple  Cardiovascular: +S1/S2, RRR  Respiratory: CTA B/L; no W/R/R  Gastrointestinal: soft, NT/ND; +BSx4  Extremities: WWP; no edema, clubbing or cyanosis  Vascular: 2+ radial, DP/PT pulses B/L  Neurological: AAOx3; no focal deficits  Psychiatric: pleasant mood and affect  Dermatologic: no appreciable wounds or damage to the skin    ******************************************************  ATTENDING ATTESTATION  Patient seen and discussed with resident team on the day of discharge.   72 y/o M with hx of seizures, osteoporosis , chronic back pain ([facility])   presents to ED with weakness, poor po intake, and inability to care for self at home  # headache  - MRI head without stroke. some relief with pain regimen, but still present. possible component of opioid induced pain hypersensitivity given hx of high dose opiate use f[facility] with [facility]  ([facility], [facility]; was told in the past that they were monitoring his symptoms to determine if surgery was indicated. )   # back pain - not worsened from baseline ; some relief with oxycodone. pain regimen per pain medicine recs.   # severe protein calorie malnutrition - BMI 18.5 . supplements per nutrition recs.   # BPH - tamsulosin decreased to 0.4mg qHS, as 0.8 could be contributory to lightheadedness. No retention on this dose    counseled on return precautions. Advised to return to ED should symptoms recur / worsen

## 2024-07-09 NOTE — PROGRESS NOTE ADULT - PROBLEM SELECTOR PLAN 3
Cr on admission = 0.73. Cr today = 1.06. Given change of 0.3 within 48 hours, pt meets criteria for COLIN.   Pt also describing urinary hesitancy.     PLAN:  - F/u CMP  - F/u UA  - Bladder scan q6hr Cr on admission = 0.73. Cr today = 1.06. Given change of 0.3 within 48 hours, pt meets criteria for COLIN.  Cr today is 0.67.     PLAN:  - F/u CMPs  - F/u UA  - Bladder scan q6hr

## 2024-07-09 NOTE — PROGRESS NOTE ADULT - PROBLEM SELECTOR PLAN 9
BMI <19   BMI: BMI (kg/m2): 18.5 (07-07-24 @ 13:24)  Affects all aspects of care.  Dose medications by weight   Nutritional supplements per nutrition consult Pt endorses poor sleep 2/2 HA and back pain.    PLAN:  - Melatonin 3mg qhs

## 2024-07-09 NOTE — DISCHARGE NOTE PROVIDER - PROVIDER TOKENS
FREE:[LAST:[David],FIRST:[Gregorio],PHONE:[phone]],FAX:[(   [phone]    -],ADDRESS[facility] [address], [facility]],ESTABLISHEDPATIENT:[T]]

## 2024-07-09 NOTE — PROGRESS NOTE ADULT - SUBJECTIVE AND OBJECTIVE BOX
Neurology Progress Note    Interval History:    Patient was seen and examined, no acute event over night. Patient reports persistent headache.    Medications:  acetaminophen     Tablet .. 1000 milliGRAM(s) Oral every 8 hours  enoxaparin Injectable 40 milliGRAM(s) SubCutaneous every 24 hours  lamoTRIgine 100 milliGRAM(s) Oral every 12 hours  lidocaine   4% Patch 1 Patch Transdermal daily  melatonin 3 milliGRAM(s) Oral at bedtime  multivitamin 1 Tablet(s) Oral daily  oxyCODONE    IR 5 milliGRAM(s) Oral every 6 hours PRN  pregabalin 25 milliGRAM(s) Oral every 8 hours  tamsulosin 0.4 milliGRAM(s) Oral at bedtime  thiamine IVPB 500 milliGRAM(s) IV Intermittent daily      Vital Signs Last 24 Hrs  T(C): 36.4 (09 Jul 2024 11:54), Max: 37.1 (08 Jul 2024 20:22)  T(F): 97.6 (09 Jul 2024 11:54), Max: 98.7 (08 Jul 2024 20:22)  HR: 99 (09 Jul 2024 11:54) (82 - 99)  BP: 109/66 (09 Jul 2024 11:54) (97/62 - 117/73)  BP(mean): --  RR: 17 (09 Jul 2024 11:54) (16 - 18)  SpO2: 92% (09 Jul 2024 11:54) (92% - 95%)    Parameters below as of 09 Jul 2024 11:54  Patient On (Oxygen Delivery Method): room air    Neurological Examination:  General: Appearance is consistent with chronologic age.   Cognitive/Language: Awake, alert, and oriented to person, place, time and date. Fund of knowledge is appropriate.  Naming, comprehension intact. Nondysarthric.  Head: b/l temporal tenderness. No sinus tenderness.  Cranial Nerves  - Eyes: EOMI w/o nystagmus, skew or reported double vision. PERRL.  No ptosis/weakness of eyelid closure.  - Face: Facial sensation normal V1 - 3, no facial asymmetry.  - Ears/Nose/Throat: Palate elevates midline.  Tongue and uvula midline.  Motor exam: Normal tone and bulk. No tenderness, twitching, tremors or involuntary movements.            Upper extremity                  Bicep     Tricep     HG                                                 R      5/5        5/5          5/5                                                    L       5/5        5/5          5/5              Lower extremity                   HF        KE        DF         PF                                                  R     4/5       4/5       5/5       5/5                                               L      4/5      4/5       5/5        5/5    Sensory examination: Intact to light touch and pinprick, temperature and vibration in all extremities.  Reflexes: 2+ b/l biceps, triceps, and achilles. Patella areflexia. Plantar response downgoing b/l. Christina, clonus absent.  Cerebellum: FTN intact. HKS R abnormal.    Labs:  CBC Full  -  ( 09 Jul 2024 05:30 )  WBC Count : 7.60 K/uL  RBC Count : 3.87 M/uL  Hemoglobin : 12.3 g/dL  Hematocrit : 36.6 %  Platelet Count - Automated : 247 K/uL  Mean Cell Volume : 94.6 fl  Mean Cell Hemoglobin : 31.8 pg  Mean Cell Hemoglobin Concentration : 33.6 gm/dL  Auto Neutrophil # : 5.50 K/uL  Auto Lymphocyte # : 1.08 K/uL  Auto Monocyte # : 0.58 K/uL  Auto Eosinophil # : 0.36 K/uL  Auto Basophil # : 0.05 K/uL  Auto Neutrophil % : 72.4 %  Auto Lymphocyte % : 14.2 %  Auto Monocyte % : 7.6 %  Auto Eosinophil % : 4.7 %  Auto Basophil % : 0.7 %    07-09    139  |  103  |  13  ----------------------------<  98  3.6   |  23  |  0.67    Ca    9.3      09 Jul 2024 05:30  Phos  2.7     07-09  Mg     1.8     07-09    TPro  6.9  /  Alb  3.9  /  TBili  0.2  /  DBili  x   /  AST  14  /  ALT  23  /  AlkPhos  80  07-08    LIVER FUNCTIONS - ( 08 Jul 2024 14:55 )  Alb: 3.9 g/dL / Pro: 6.9 g/dL / ALK PHOS: 80 U/L / ALT: 23 U/L / AST: 14 U/L / GGT: x             Urinalysis Basic - ( 09 Jul 2024 05:30 )    Color: x / Appearance: x / SG: x / pH: x  Gluc: 98 mg/dL / Ketone: x  / Bili: x / Urobili: x   Blood: x / Protein: x / Nitrite: x   Leuk Esterase: x / RBC: x / WBC x   Sq Epi: x / Non Sq Epi: x / Bacteria: x

## 2024-07-09 NOTE — DISCHARGE NOTE PROVIDER - NSDCCPCAREPLAN_GEN_ALL_CORE_FT
PRINCIPAL DISCHARGE DIAGNOSIS  Diagnosis: Weakness  Assessment and Plan of Treatment:       SECONDARY DISCHARGE DIAGNOSES  Diagnosis: Headache, unspecified  Assessment and Plan of Treatment:     Diagnosis: Back pain with history of spinal surgery  Assessment and Plan of Treatment:      PRINCIPAL DISCHARGE DIAGNOSIS  Diagnosis: Weakness  Assessment and Plan of Treatment: Weakness is a lack of strength. You may feel weak all over your body or just in one specific part of your body. Weakness can be caused by a variety of things. In some cases, the cause may be unknown. Rest as needed, and try to get enough sleep. Most adults need 7–8 hours of quality sleep each night. Eat a healthy, well-balanced diet. You are going to subacute rehab where cecilia leung work with physical therapists and occupational therapists to try and rebuild your strength.      SECONDARY DISCHARGE DIAGNOSES  Diagnosis: Back pain with history of spinal surgery  Assessment and Plan of Treatment: Chronic back pain is back pain that lasts longer than 3 months. The cause of your back pain may not be known. Some common causes include: Wear and tear (degenerative disease) of the bones, disks, or tissues that connect bones to each other (ligaments) in your back. Inflammation and stiffness in your back (arthritis). If you have chronic back pain, you may have times when the pain is more intense (flare-ups). You can also learn to manage the pain with home care. We are sending you home with a new medication regimen. Please take lyrica 25mg every 8 hours, acetaminophen 500mg 2 tablets every 8 hours, and oxycodone 5mg every 6 hours as needed. Please follow up with a pain management specialist after leaving rehab.       Diagnosis: Headache, unspecified  Assessment and Plan of Treatment: An acute headache is pain or discomfort that may start suddenly and get worse quickly. You may have an acute headache only when you feel stress or eat certain foods. Other acute headache pain can happen every day, and sometimes several times a day. The cause of an acute headache may not be known. It may be triggered by stress, fatigue, hormones, food, or trauma. You should follow up with a neurologist after leaving rehab.

## 2024-07-09 NOTE — PROGRESS NOTE ADULT - PROBLEM SELECTOR PLAN 8
Patient with hx of osteoporosis, not currently taking meds but states he used to take alendronate ?everyday prior to onset of infection. Pt was supposedly switched to bisphosphonate injection right before admission.    - F/u vit D level  - [facility] Pt takes tamsulosin 0.4mg 2 tabs (0.8mg) BID at home    PLAN:  - Start tamsulosin 0.4 mg BID given BPs on lower end

## 2024-07-09 NOTE — PROGRESS NOTE ADULT - PROBLEM SELECTOR PLAN 1
Patient with recent admission to [facility], discharged on 7/3 to [facility] and [facility], unable to care for self including with eating/ambulating. Diagnosed with infection at the time PNA v. enterocolitis and discharged on antibiotics. Does not meet sepsis criteria on admission. Electrolytes wnl. Low concern for metabolic causes of weakness, no focal deficits on exam which makes stroke unlikely. TSH WNL. Likely weakness 2/2 deconditioning given recent hospital stay, poor access to nutrition, and inadequate pain control.    PLAN:  - F/u PT and OT recs  - F/u nutrition consults  - R/o reversible causes --> f/u B12, methylmalonic acid  - Start multivitamin, thiamine 500mg IV x 3d  - Monitor for refeeding syndrome  - F/u A1c Patient with recent admission to [facility], discharged on 7/3 to [facility], unable to care for self including with eating/ambulating. Diagnosed with infection at the time PNA v. enterocolitis and discharged on antibiotics. Does not meet sepsis criteria on admission. Electrolytes wnl. Low concern for metabolic causes of weakness, no focal deficits on exam which makes stroke unlikely. Likely weakness 2/2 deconditioning given recent hospital stay, poor access to nutrition, and inadequate pain control.  A1C 5.6%, Vit B12 1033  TSH 2.35    PLAN:  - F/u PT and OT recs  - F/u nutrition consults  - F/u methylmalonic acid  - C/w multivitamin  - C/w thiamine 500mg IV x 3d (today is day 2)  - Monitor for refeeding syndrome

## 2024-07-09 NOTE — PROGRESS NOTE ADULT - TIME BILLING
review of objective data, interview of patient, and discussion of assessment plan with patient/family/multidisciplinary team. I agree with ACP, Fellow documentation except where indicated above.
Bedside exam and interview   Reviewed vitals, labs   Discussed patient's plan of care with housestaff   Documentation of encounter
Bedside exam and interview   Reviewed vitals, labs   Discussed patient's plan of care with housestaff   Documentation of encounter

## 2024-07-09 NOTE — PROGRESS NOTE ADULT - PROBLEM SELECTOR PLAN 6
Pt endorses poor sleep 2/2 HA and back pain.    PLAN:  - Melatonin 3mg qhs #R/O Pneumonia  [facility] with [facility]. PNA v. enterocolitis. VSS and labs wnl on admission. CXR in ED showed small R pleural effusion. Discharged from [place] on levaquin and cefpodoxime, unclear if completed course. Given CTX and azithromycin in the ED. Pt continues to show no signs of overt infection (no cough, fever, or leukocytosis).  Procal: 0.06, CRP 3.4, ESR 10.    PLAN:  - F/u blood cultures   - Hold off abx for now,

## 2024-07-10 ENCOUNTER — TRANSCRIPTION ENCOUNTER (OUTPATIENT)
Age: 74
End: 2024-07-10

## 2024-07-10 VITALS
RESPIRATION RATE: 17 BRPM | DIASTOLIC BLOOD PRESSURE: 68 MMHG | SYSTOLIC BLOOD PRESSURE: 108 MMHG | TEMPERATURE: 98 F | HEART RATE: 104 BPM | OXYGEN SATURATION: 93 %

## 2024-07-10 PROCEDURE — 71045 X-RAY EXAM CHEST 1 VIEW: CPT

## 2024-07-10 PROCEDURE — 84443 ASSAY THYROID STIM HORMONE: CPT

## 2024-07-10 PROCEDURE — 70496 CT ANGIOGRAPHY HEAD: CPT | Mod: MC

## 2024-07-10 PROCEDURE — 70551 MRI BRAIN STEM W/O DYE: CPT | Mod: MC

## 2024-07-10 PROCEDURE — 97116 GAIT TRAINING THERAPY: CPT

## 2024-07-10 PROCEDURE — 70450 CT HEAD/BRAIN W/O DYE: CPT | Mod: MC

## 2024-07-10 PROCEDURE — 97161 PT EVAL LOW COMPLEX 20 MIN: CPT

## 2024-07-10 PROCEDURE — 36415 COLL VENOUS BLD VENIPUNCTURE: CPT

## 2024-07-10 PROCEDURE — 83921 ORGANIC ACID SINGLE QUANT: CPT

## 2024-07-10 PROCEDURE — 99285 EMERGENCY DEPT VISIT HI MDM: CPT

## 2024-07-10 PROCEDURE — 82607 VITAMIN B-12: CPT

## 2024-07-10 PROCEDURE — 83036 HEMOGLOBIN GLYCOSYLATED A1C: CPT

## 2024-07-10 PROCEDURE — 83735 ASSAY OF MAGNESIUM: CPT

## 2024-07-10 PROCEDURE — 99239 HOSP IP/OBS DSCHRG MGMT >30: CPT | Mod: GC

## 2024-07-10 PROCEDURE — 84145 PROCALCITONIN (PCT): CPT

## 2024-07-10 PROCEDURE — 85027 COMPLETE CBC AUTOMATED: CPT

## 2024-07-10 PROCEDURE — 85652 RBC SED RATE AUTOMATED: CPT

## 2024-07-10 PROCEDURE — 84439 ASSAY OF FREE THYROXINE: CPT

## 2024-07-10 PROCEDURE — 82962 GLUCOSE BLOOD TEST: CPT

## 2024-07-10 PROCEDURE — 70498 CT ANGIOGRAPHY NECK: CPT | Mod: MC

## 2024-07-10 PROCEDURE — 97165 OT EVAL LOW COMPLEX 30 MIN: CPT

## 2024-07-10 PROCEDURE — 82610 CYSTATIN C: CPT

## 2024-07-10 PROCEDURE — 82746 ASSAY OF FOLIC ACID SERUM: CPT

## 2024-07-10 PROCEDURE — 87040 BLOOD CULTURE FOR BACTERIA: CPT

## 2024-07-10 PROCEDURE — 82306 VITAMIN D 25 HYDROXY: CPT

## 2024-07-10 PROCEDURE — 80053 COMPREHEN METABOLIC PANEL: CPT

## 2024-07-10 PROCEDURE — 99233 SBSQ HOSP IP/OBS HIGH 50: CPT

## 2024-07-10 PROCEDURE — 93005 ELECTROCARDIOGRAM TRACING: CPT

## 2024-07-10 PROCEDURE — 86140 C-REACTIVE PROTEIN: CPT

## 2024-07-10 PROCEDURE — 80048 BASIC METABOLIC PNL TOTAL CA: CPT

## 2024-07-10 PROCEDURE — 80175 DRUG SCREEN QUAN LAMOTRIGINE: CPT

## 2024-07-10 PROCEDURE — 86780 TREPONEMA PALLIDUM: CPT

## 2024-07-10 PROCEDURE — 84100 ASSAY OF PHOSPHORUS: CPT

## 2024-07-10 PROCEDURE — 85025 COMPLETE CBC W/AUTO DIFF WBC: CPT

## 2024-07-10 PROCEDURE — 0042T: CPT | Mod: MC

## 2024-07-10 PROCEDURE — 83880 ASSAY OF NATRIURETIC PEPTIDE: CPT

## 2024-07-10 RX ORDER — PREGABALIN 50 MG/1
1 CAPSULE ORAL
Qty: 0 | Refills: 0 | DISCHARGE
Start: 2024-07-10

## 2024-07-10 RX ORDER — BUTALB/ACETAMINOPHEN/CAFFEINE 50-325-40
1 TABLET ORAL ONCE
Refills: 0 | Status: COMPLETED | OUTPATIENT
Start: 2024-07-10 | End: 2024-07-10

## 2024-07-10 RX ORDER — SENNOSIDES 8.6 MG
2 TABLET ORAL
Qty: 0 | Refills: 0 | DISCHARGE
Start: 2024-07-10

## 2024-07-10 RX ADMIN — PREGABALIN 50 MILLIGRAM(S): 50 CAPSULE ORAL at 13:03

## 2024-07-10 RX ADMIN — Medication 1 TABLET(S): at 11:06

## 2024-07-10 RX ADMIN — Medication 1000 MILLIGRAM(S): at 06:07

## 2024-07-10 RX ADMIN — Medication 1000 MILLIGRAM(S): at 07:00

## 2024-07-10 RX ADMIN — LAMOTRIGINE 100 MILLIGRAM(S): 100 TABLET ORAL at 09:00

## 2024-07-10 RX ADMIN — Medication 1 CAPSULE(S): at 10:12

## 2024-07-10 RX ADMIN — PREGABALIN 50 MILLIGRAM(S): 50 CAPSULE ORAL at 06:07

## 2024-07-10 RX ADMIN — OXYCODONE HYDROCHLORIDE 5 MILLIGRAM(S): 100 SOLUTION ORAL at 08:20

## 2024-07-10 RX ADMIN — LIDOCAINE HCL 1 PATCH: 28 GEL TOPICAL at 11:06

## 2024-07-10 RX ADMIN — OXYCODONE HYDROCHLORIDE 5 MILLIGRAM(S): 100 SOLUTION ORAL at 07:22

## 2024-07-10 RX ADMIN — Medication 105 MILLIGRAM(S): at 11:07

## 2024-07-10 RX ADMIN — Medication 1 CAPSULE(S): at 11:00

## 2024-07-10 RX ADMIN — Medication 1000 MILLIGRAM(S): at 13:03

## 2024-07-10 NOTE — PROGRESS NOTE ADULT - PROVIDER SPECIALTY LIST ADULT
Neurology
Rehab Medicine
Internal Medicine
Internal Medicine
Neurology
Pain Medicine
Internal Medicine
Neurology
Pain Medicine

## 2024-07-10 NOTE — DISCHARGE NOTE NURSING/CASE MANAGEMENT/SOCIAL WORK - [id number]_GEN_A_NUR
----- Message from Jud Park sent at 3/20/2024  2:05 PM CDT -----  Regarding: Patient advice  Contact: Ashia [phone]              Name of Caller:  Ashia  with [employer]      Contact Preference: [phone]     Nature of Call:    Requesting a call back to get more information on which test pt completed and which test are still needed      yes

## 2024-07-10 NOTE — PROGRESS NOTE ADULT - SUBJECTIVE AND OBJECTIVE BOX
PAIN MANAGEMENT CONSULT NOTE    Interval Events:  -       Since yesterday 6am, pt has required:  -      HOME MEDICATIONS:   Please refer to initial HNP    Allergies    No Known Allergies    Intolerances        PAIN MEDICATIONS:  acetaminophen     Tablet .. 1000 milliGRAM(s) Oral every 8 hours  ibuprofen  Tablet. 400 milliGRAM(s) Oral every 8 hours PRN  lamoTRIgine 100 milliGRAM(s) Oral every 12 hours  melatonin 3 milliGRAM(s) Oral at bedtime  oxyCODONE    IR 5 milliGRAM(s) Oral every 8 hours PRN  pregabalin 50 milliGRAM(s) Oral every 8 hours    Heme:  enoxaparin Injectable 40 milliGRAM(s) SubCutaneous every 24 hours    Antibiotics:    Cardiovascular:    GI:  senna 2 Tablet(s) Oral at bedtime    Endocrine:    All Other Medications:  lidocaine   4% Patch 1 Patch Transdermal daily  multivitamin 1 Tablet(s) Oral daily  tamsulosin 0.4 milliGRAM(s) Oral at bedtime  thiamine IVPB 500 milliGRAM(s) IV Intermittent daily      Vital Signs Last 24 Hrs  T(C): 36.6 (10 Jul 2024 06:19), Max: 36.9 (09 Jul 2024 20:53)  T(F): 97.8 (10 Jul 2024 06:19), Max: 98.4 (09 Jul 2024 20:53)  HR: 84 (10 Jul 2024 06:19) (84 - 99)  BP: 123/76 (10 Jul 2024 06:19) (103/64 - 123/76)  BP(mean): 91 (09 Jul 2024 22:38) (77 - 91)  RR: 18 (10 Jul 2024 06:19) (17 - 18)  SpO2: 95% (10 Jul 2024 06:19) (92% - 95%)    Parameters below as of 10 Jul 2024 06:19  Patient On (Oxygen Delivery Method): room air        LABS:                        12.3   7.60  )-----------( 247      ( 09 Jul 2024 05:30 )             36.6     07-09    139  |  103  |  13  ----------------------------<  98  3.6   |  23  |  0.67    Ca    9.3      09 Jul 2024 05:30  Phos  2.7     07-09  Mg     1.8     07-09    TPro  6.9  /  Alb  3.9  /  TBili  0.2  /  DBili  x   /  AST  14  /  ALT  23  /  AlkPhos  80  07-08      Urinalysis Basic - ( 09 Jul 2024 05:30 )    Color: x / Appearance: x / SG: x / pH: x  Gluc: 98 mg/dL / Ketone: x  / Bili: x / Urobili: x   Blood: x / Protein: x / Nitrite: x   Leuk Esterase: x / RBC: x / WBC x   Sq Epi: x / Non Sq Epi: x / Bacteria: x        RADIOLOGY:    Drug Screen:        REVIEW OF SYSTEMS:  CONSTITUTIONAL: Denies fever or fatigue   EYES: Denies eye pain, visual disturbances  HEENT: Denies difficulty hearing, throat/neck pain or stiffness  RESPIRATORY: Denies SOB, cough, wheezing  CARDIOVASCULAR: Denies chest pain, palpitations.   GASTROINTESTINAL: Endorses +flatus, BMs. Denies nausea, vomiting, abdominal or epigastric pain.   GENITOURINARY: Denies dysuria, frequency, or incontinence  NEUROLOGICAL: Endorses *** numbness, tingling. Denies headaches, loss of strength, tremors, dizziness or lightheadedness with pain medications.   MUSCULOSKELETAL: Denies joint pain or swelling      FUNCTIONAL ASSESSMENT:  PAIN SCORE AT REST:         SCALE USED: (1-10 VNRS)  PAIN SCORE WITH ACTIVITY:         SCALE USED: (1-10 VNRS)    PAIN ASSESSMENT:    FOCUSED PHYSICAL EXAM  GENERAL: Laying in bed, NAD  NEURO: CN II-XII grossly intact, EOMI  PULM: unlabored  CV: Regular rate and rhythm  ABDOMEN: Soft, Nontender, Nondistended  EXTREMITIES:  2+ Peripheral Pulses, No clubbing, cyanosis, or edema  SKIN: No rashes or lesions      ASSESSMENT: 72 y/o M with PMH of seizures, osteoporosis presents to ED with weakness, poor po intake, and inability to care for self at home, admitted for safe discharge planning and PT eval.       PLAN:   - continue tylenol 1gm q8h  - *** lyrica to 50mg q8h  - continue lidocaine patch daily  - ***ibuprofen 400mg q8h prn moderate pain  - ***oxycodone 5mg to q8h prn severe pain  - monitor closely for oversedation, ensure narcan is ordered  - escalate bowel regimen as needed for bowel movement daily  ***recs not finalized***    - Bowel regimen: Senna    - Nausea ppx: Zofran as needed  - Functional Goals: Pt will get OOB with PT today. Pt will resume previous level of activity without impairment from surgery.   - Additional Consults: None recommended.   - Additional Labs/Imaging:  None recommended.     - Discharge Planning: per primary team  - Pain Management follow up plan: will continue to follow    Plan d/w Dr. Avila.     Dior Haro NP  Acute Pain Service PAIN MANAGEMENT CONSULT NOTE    Interval Events:  - NAEON  - pt reports continued headache, neuro workup negative      Since yesterday 6am, pt has required:  - oxycodone 5mg x 2      HOME MEDICATIONS:   Please refer to initial HNP    Allergies    No Known Allergies    Intolerances        PAIN MEDICATIONS:  acetaminophen     Tablet .. 1000 milliGRAM(s) Oral every 8 hours  ibuprofen  Tablet. 400 milliGRAM(s) Oral every 8 hours PRN  lamoTRIgine 100 milliGRAM(s) Oral every 12 hours  melatonin 3 milliGRAM(s) Oral at bedtime  oxyCODONE    IR 5 milliGRAM(s) Oral every 8 hours PRN  pregabalin 50 milliGRAM(s) Oral every 8 hours    Heme:  enoxaparin Injectable 40 milliGRAM(s) SubCutaneous every 24 hours    Antibiotics:    Cardiovascular:    GI:  senna 2 Tablet(s) Oral at bedtime    Endocrine:    All Other Medications:  lidocaine   4% Patch 1 Patch Transdermal daily  multivitamin 1 Tablet(s) Oral daily  tamsulosin 0.4 milliGRAM(s) Oral at bedtime  thiamine IVPB 500 milliGRAM(s) IV Intermittent daily      Vital Signs Last 24 Hrs  T(C): 36.6 (10 Jul 2024 06:19), Max: 36.9 (09 Jul 2024 20:53)  T(F): 97.8 (10 Jul 2024 06:19), Max: 98.4 (09 Jul 2024 20:53)  HR: 84 (10 Jul 2024 06:19) (84 - 99)  BP: 123/76 (10 Jul 2024 06:19) (103/64 - 123/76)  BP(mean): 91 (09 Jul 2024 22:38) (77 - 91)  RR: 18 (10 Jul 2024 06:19) (17 - 18)  SpO2: 95% (10 Jul 2024 06:19) (92% - 95%)    Parameters below as of 10 Jul 2024 06:19  Patient On (Oxygen Delivery Method): room air        LABS:                        12.3   7.60  )-----------( 247      ( 09 Jul 2024 05:30 )             36.6     07-09    139  |  103  |  13  ----------------------------<  98  3.6   |  23  |  0.67    Ca    9.3      09 Jul 2024 05:30  Phos  2.7     07-09  Mg     1.8     07-09    TPro  6.9  /  Alb  3.9  /  TBili  0.2  /  DBili  x   /  AST  14  /  ALT  23  /  AlkPhos  80  07-08      Urinalysis Basic - ( 09 Jul 2024 05:30 )    Color: x / Appearance: x / SG: x / pH: x  Gluc: 98 mg/dL / Ketone: x  / Bili: x / Urobili: x   Blood: x / Protein: x / Nitrite: x   Leuk Esterase: x / RBC: x / WBC x   Sq Epi: x / Non Sq Epi: x / Bacteria: x        RADIOLOGY:    Drug Screen:        REVIEW OF SYSTEMS:  CONSTITUTIONAL: Denies fever or fatigue   EYES: Denies eye pain, visual disturbances  HEENT: Denies difficulty hearing, throat/neck pain or stiffness  RESPIRATORY: Denies SOB, cough, wheezing  CARDIOVASCULAR: Denies chest pain, palpitations.   GASTROINTESTINAL: Endorses +flatus, BMs. Denies nausea, vomiting, abdominal or epigastric pain.   GENITOURINARY: Denies dysuria, frequency, or incontinence  NEUROLOGICAL: Denies new numbness, tingling. Denies headaches, loss of strength, tremors, dizziness or lightheadedness with pain medications.   MUSCULOSKELETAL: Denies joint pain or swelling      FUNCTIONAL ASSESSMENT:  PAIN SCORE AT REST:         SCALE USED: (1-10 VNRS)  PAIN SCORE WITH ACTIVITY:         SCALE USED: (1-10 VNRS)    PAIN ASSESSMENT:  - 10/10 continued headache    FOCUSED PHYSICAL EXAM  GENERAL: Laying in bed, NAD  NEURO: CN II-XII grossly intact, EOMI  PULM: unlabored  CV: Regular rate and rhythm  ABDOMEN: Soft, Nontender, Nondistended  EXTREMITIES:  2+ Peripheral Pulses, No clubbing, cyanosis, or edema  SKIN: No rashes or lesions      ASSESSMENT: 72 y/o M with PMH of seizures, osteoporosis presents to ED with weakness, poor po intake, and inability to care for self at home, admitted for safe discharge planning and PT eval.       PLAN:   - agree with Fioricet prn, if pt tolerates well then would dc tylenol   - make tylenol 1gm q8h prn  - continue lyrica to 50mg q8h  - continue lidocaine patch daily  - continue ibuprofen 400mg q8h prn moderate pain  - continue oxycodone 5mg to q8h prn severe pain  - monitor closely for oversedation, ensure narcan is ordered  - escalate bowel regimen as needed for bowel movement daily    - Bowel regimen: Senna    - Nausea ppx: Zofran as needed  - Functional Goals: Pt will get OOB with PT today. Pt will resume previous level of activity without impairment from surgery.   - Additional Consults: None recommended.   - Additional Labs/Imaging:  None recommended.     - Discharge Planning: per primary team  - Pain Management follow up plan: will continue to follow    Plan d/w Dr. Avila.     Dior Haro NP  Acute Pain Service PAIN MANAGEMENT CONSULT NOTE    Interval Events:  - NAEON  - pt reports continued headache, neuro workup negative      Since yesterday 6am, pt has required:  - oxycodone 5mg x 2      HOME MEDICATIONS:   Please refer to initial HNP    Allergies    No Known Allergies    Intolerances        PAIN MEDICATIONS:  acetaminophen     Tablet .. 1000 milliGRAM(s) Oral every 8 hours  ibuprofen  Tablet. 400 milliGRAM(s) Oral every 8 hours PRN  lamoTRIgine 100 milliGRAM(s) Oral every 12 hours  melatonin 3 milliGRAM(s) Oral at bedtime  oxyCODONE    IR 5 milliGRAM(s) Oral every 8 hours PRN  pregabalin 50 milliGRAM(s) Oral every 8 hours    Heme:  enoxaparin Injectable 40 milliGRAM(s) SubCutaneous every 24 hours    Antibiotics:    Cardiovascular:    GI:  senna 2 Tablet(s) Oral at bedtime    Endocrine:    All Other Medications:  lidocaine   4% Patch 1 Patch Transdermal daily  multivitamin 1 Tablet(s) Oral daily  tamsulosin 0.4 milliGRAM(s) Oral at bedtime  thiamine IVPB 500 milliGRAM(s) IV Intermittent daily      Vital Signs Last 24 Hrs  T(C): 36.6 (10 Jul 2024 06:19), Max: 36.9 (09 Jul 2024 20:53)  T(F): 97.8 (10 Jul 2024 06:19), Max: 98.4 (09 Jul 2024 20:53)  HR: 84 (10 Jul 2024 06:19) (84 - 99)  BP: 123/76 (10 Jul 2024 06:19) (103/64 - 123/76)  BP(mean): 91 (09 Jul 2024 22:38) (77 - 91)  RR: 18 (10 Jul 2024 06:19) (17 - 18)  SpO2: 95% (10 Jul 2024 06:19) (92% - 95%)    Parameters below as of 10 Jul 2024 06:19  Patient On (Oxygen Delivery Method): room air        LABS:                        12.3   7.60  )-----------( 247      ( 09 Jul 2024 05:30 )             36.6     07-09    139  |  103  |  13  ----------------------------<  98  3.6   |  23  |  0.67    Ca    9.3      09 Jul 2024 05:30  Phos  2.7     07-09  Mg     1.8     07-09    TPro  6.9  /  Alb  3.9  /  TBili  0.2  /  DBili  x   /  AST  14  /  ALT  23  /  AlkPhos  80  07-08      Urinalysis Basic - ( 09 Jul 2024 05:30 )    Color: x / Appearance: x / SG: x / pH: x  Gluc: 98 mg/dL / Ketone: x  / Bili: x / Urobili: x   Blood: x / Protein: x / Nitrite: x   Leuk Esterase: x / RBC: x / WBC x   Sq Epi: x / Non Sq Epi: x / Bacteria: x        RADIOLOGY:    Drug Screen:        REVIEW OF SYSTEMS:  CONSTITUTIONAL: Denies fever or fatigue   EYES: Denies eye pain, visual disturbances  HEENT: Denies difficulty hearing, throat/neck pain or stiffness  RESPIRATORY: Denies SOB, cough, wheezing  CARDIOVASCULAR: Denies chest pain, palpitations.   GASTROINTESTINAL: Endorses +flatus, BMs. Denies nausea, vomiting, abdominal or epigastric pain.   GENITOURINARY: Denies dysuria, frequency, or incontinence  NEUROLOGICAL: Denies new numbness, tingling. Denies headaches, loss of strength, tremors, dizziness or lightheadedness with pain medications.   MUSCULOSKELETAL: Denies joint pain or swelling      FUNCTIONAL ASSESSMENT:  PAIN SCORE AT REST:         SCALE USED: (1-10 VNRS)  PAIN SCORE WITH ACTIVITY:         SCALE USED: (1-10 VNRS)    PAIN ASSESSMENT:  - 10/10 continued headache    FOCUSED PHYSICAL EXAM  GENERAL: Laying in bed, NAD  NEURO: CN II-XII grossly intact, EOMI  PULM: unlabored  CV: Regular rate and rhythm  ABDOMEN: Soft, Nontender, Nondistended  EXTREMITIES:  2+ Peripheral Pulses, No clubbing, cyanosis, or edema  SKIN: No rashes or lesions      ASSESSMENT: 74 y/o M with PMH of seizures, osteoporosis presents to ED with weakness, poor po intake, and inability to care for self at home, admitted for safe discharge planning and PT eval.       PLAN:   - agree with Fioricet prn, if pt tolerates well then would dc tylenol   - make tylenol 1gm q8h prn  - continue lyrica to 50mg q8h  - continue lidocaine patch daily  - continue ibuprofen 400mg q8h prn moderate pain  - continue oxycodone 5mg to q8h prn severe pain  - monitor closely for oversedation, ensure narcan is ordered  - escalate bowel regimen as needed for bowel movement daily    - Bowel regimen: Senna    - Nausea ppx: Zofran as needed  - Functional Goals: Pt will get OOB with PT today. Pt will resume previous level of activity without impairment from surgery.   - Additional Consults: None recommended.   - Additional Labs/Imaging:  None recommended.     - Discharge Planning: per primary team  - Pain Management follow up plan: will continue to follow    Plan pending d/w Dr. Avila.     Dior Haro NP  Acute Pain Service

## 2024-07-10 NOTE — PROGRESS NOTE ADULT - ASSESSMENT
I M       72 y/o M with PMH of seizures, osteoporosis presents to ED with weakness, poor po intake, and inability to care for self at home, admitted for pain management, PT eval, and safe discharge planning.       Nutritional Assessment:  · Nutritional Assessment	This patient has been assessed with a concern for Malnutrition and has been determined to have a diagnosis/diagnoses of Severe protein-calorie malnutrition and Underweight (BMI < 19).    This patient is being managed with:   Diet Regular-  Supplement Feeding Modality:  Oral  Ensure Enlive Cans or Servings Per Day:  1       Frequency:  Daily  Entered: Jul 8 2024  1:50PM    Problem/Plan - 1:  ·  Problem: Generalized weakness.   ·  Plan: Patient with recent admission to [facility], discharged on 7/3 to [facility], unable to care for self including with eating/ambulating. Diagnosed with infection at the time PNA v. enterocolitis and discharged on antibiotics. Does not meet sepsis criteria on admission. Electrolytes wnl. Low concern for metabolic causes of weakness, no focal deficits on exam which makes stroke unlikely. Likely weakness 2/2 deconditioning given recent hospital stay, poor access to nutrition, and inadequate pain control.  A1C 5.6%, Vit B12 1033  TSH 2.35    PLAN:  - F/u PT and OT recs  - F/u nutrition consults  - F/u methylmalonic acid  - C/w multivitamin  - C/w thiamine 500mg IV x 3d (today is day 2)  - Monitor for refeeding syndrome.    Problem/Plan - 2:  ·  Problem: Headache, unspecified.   ·  Plan: Pt reports severe HA on and off for past month. HA this AM, along with transient L sided facial droop, prompted stroke code. CT head was negative. Pt is back to baseline with no focal deficits, but still c/o HA that is refractory to toradol, ofirmev, and dilaudid.   Neuro recommended fioricet prn, but held for now due to overlap with pain management recommendations    PLAN:  - F/u MRI head  - F/u neuro recs  - C/w pain regimen as recommended by pain management (see below)  - Elevate head of bed  - Aspiration precautions.    Problem/Plan - 3:  ·  Problem: COLIN (acute kidney injury).   ·  Plan: Cr on admission = 0.73. Cr today = 1.06. Given change of 0.3 within 48 hours, pt meets criteria for COLIN.  Cr today is 0.67.     PLAN:  - F/u CMPs  - F/u UA  - Bladder scan q6hr.    Problem/Plan - 4:  ·  Problem: Back pain with history of spinal surgery.   ·  Plan: Pt had spinal surgery in 1996. Reports he has had residual back pain since surgery. Also c/o chronic neck pain.  Per pt, takes morphine po 200mg in AM, 230mg in afternoon, and 100mg QHS, last RX in February per [facility]    - C/w lidocaine patch  - F/u pain management  - C/w tylenol 1gm q8hr standing, lyrica 50mg q8hr, lidocaine patch daily  - Start ibuprofen 400mg q7hr prn for moderate pain  - Increase oxycodone 5mg to q8hr for severe pain.    Problem/Plan - 5:  ·  Problem: Severe protein-calorie malnutrition.   ·  Plan: BMI <19   BMI: BMI (kg/m2): 18.5 (07-07-24 @ 13:24)  Affects all aspects of care.    - Dose medications by weight   - Nutritional supplements per nutrition consult.    Problem/Plan - 6:  ·  Problem: Pneumonia.   ·  Plan: #R/O Pneumonia  [facility]. PNA v. enterocolitis. VSS and labs wnl on admission. CXR in ED showed small R pleural effusion. Discharged from [place] on levaquin and cefpodoxime, unclear if completed course. Given CTX and azithromycin in the ED. Pt continues to show no signs of overt infection (no cough, fever, or leukocytosis).  Procal: 0.06, CRP 3.4, ESR 10.    PLAN:  - F/u blood cultures   - Hold off abx for now,    Problem/Plan - 7:  ·  Problem: Constipation.   ·  Plan: Pt has not had a bowel movement since 7/4.    PLAN:  - Start senna 2 tablets qhs.    Problem/Plan - 8:  ·  Problem: Benign prostatic hyperplasia.   ·  Plan: Pt takes tamsulosin 0.4mg 2 tabs (0.8mg) BID at home    PLAN:  - Start tamsulosin 0.4 mg BID given BPs on lower end.    Problem/Plan - 9:  ·  Problem: Insomnia.   ·  Plan: Pt endorses poor sleep 2/2 HA and back pain.    PLAN:  - Melatonin 3mg qhs.    Problem/Plan - 10:  ·  Problem: Seizure disorder.   ·  Plan; Patient with hx of seizures, reportedly on lamotrigine 100mg bid, unable to verify recent prescriptions on [employer].     PLAN:  - C/w home lamotrigine 100mg  - F/u lamotrigine levels  - Seizure precautions.    Problem/Plan - 11:  ·  Problem: Osteoporosis.   ·  Plan: Patient with hx of osteoporosis, not currently taking meds but states he used to take alendronate weekly prior to onset of infection. Pt was supposedly switched to forteo injection right before admission.  Vit D 34.2    - Hold outpt meds fro now as not sure what pt was using before admission.    Problem/Plan - 12:  ·  Problem: Prophylactic measure.   ·  Plan: F: s/p 1L NS   E: replete as needed  N: regular diet plus Ensure and [employer]  DVT ppx: lovenox  Dispo: [facility],  [facility]

## 2024-07-10 NOTE — PROGRESS NOTE ADULT - REASON FOR ADMISSION
inability to care for self

## 2024-07-10 NOTE — PROGRESS NOTE ADULT - NUTRITIONAL ASSESSMENT
This patient has been assessed with a concern for Malnutrition and has been determined to have a diagnosis/diagnoses of Severe protein-calorie malnutrition and Underweight (BMI < 19).    This patient is being managed with:   Diet Regular-  Supplement Feeding Modality:  Oral  Ensure Enlive Cans or Servings Per Day:  1       Frequency:  Daily  Entered: Jul 8 2024  1:50PM  
This patient has been assessed with a concern for Malnutrition and has been determined to have a diagnosis/diagnoses of Severe protein-calorie malnutrition and Underweight (BMI < 19).    This patient is being managed with:   Diet Regular-  Supplement Feeding Modality:  Oral  Ensure Enlive Cans or Servings Per Day:  1       Frequency:  Daily  Entered: Jul 8 2024  1:50PM

## 2024-07-10 NOTE — PROGRESS NOTE ADULT - ATTENDING COMMENTS
see consult note dated 7/8/24
MRI brain negative for acute findings  Headache unchanged  Can try migraine cocktail above although status migrainosus is unlikely given no prior history of migraine   can f/u with neurology outpatient
74 y/o M with hx of seizures, osteoporosis , chronic back pain ([facility])   presents to ED with weakness, poor po intake, and inability to care for self at home    # headache   present x ~1 month ; f/u MRI head     # back pain   not worsened from baseline   [  ] start bowel regimen , miralax + senna while on opiate regimen for pain control     weakness, failure to thrive  [ ] if BUN /CR > 20 tomorrow, give bolus of 1L LR
72 y/o M with hx of seizures, osteoporosis , chronic back pain ([facility])   presents to ED with weakness, poor po intake, and inability to care for self at home    # headache   present x ~1 month   had stroke code earlier this admission   [ ] agree with neuro stroke recs for MRI head. f/u MRI head   trial fioricet for headache     # back pain   not worsened from baseline   [  ] start bowel regimen , while on opiate regimen for pain control     # severe protein calorie malnutrition   - nutritional supplements per nutrition consult recs

## 2024-07-10 NOTE — PROGRESS NOTE ADULT - SUBJECTIVE AND OBJECTIVE BOX
Patient is a 73y old  Male who presents with a chief complaint of inability to care for self (09 Jul 2024 13:25)      HOSPITAL COURSE:     OVERNIGHT EVENTS:    SUBJECTIVE:     ROS: otherwise negative      T(C): 36.9 (07-09-24 @ 20:53), Max: 36.9 (07-09-24 @ 06:26)  HR: 87 (07-09-24 @ 22:38) (82 - 99)  BP: 122/76 (07-09-24 @ 22:38) (97/62 - 122/76)  RR: 18 (07-09-24 @ 22:38) (16 - 18)  SpO2: 94% (07-09-24 @ 22:38) (92% - 94%)  Wt(kg): --Vital Signs Last 24 Hrs  T(C): 36.9 (09 Jul 2024 20:53), Max: 36.9 (09 Jul 2024 06:26)  T(F): 98.4 (09 Jul 2024 20:53), Max: 98.4 (09 Jul 2024 06:26)  HR: 87 (09 Jul 2024 22:38) (82 - 99)  BP: 122/76 (09 Jul 2024 22:38) (97/62 - 122/76)  BP(mean): 91 (09 Jul 2024 22:38) (77 - 91)  RR: 18 (09 Jul 2024 22:38) (16 - 18)  SpO2: 94% (09 Jul 2024 22:38) (92% - 94%)    Parameters below as of 09 Jul 2024 22:38  Patient On (Oxygen Delivery Method): room air        PHYSICAL EXAM:  Constitutional: resting comfortably in bed; NAD  Head: NC/AT  Eyes: PERRL, EOMI, anicteric sclera  ENT: no nasal discharge; MMM  Neck: supple; no JVD or thyromegaly  Respiratory: CTA B/L; no W/R/R, no retractions  Cardiac: +S1/S2; RRR; no M/R/G  Gastrointestinal: soft, NT/ND; no rebound or guarding; +BSx4  Back: spine midline, no bony tenderness or step-offs; no CVAT B/L  Extremities: WWP, no clubbing or cyanosis; no peripheral edema. Capillary refill <2 sec  Musculoskeletal: NROM x4; no joint swelling, tenderness or erythema  Vascular: 2+ radial, DP/PT pulses B/L  Dermatologic: skin warm, dry and intact; no rashes, wounds, or scars  Lymphatic: no submandibular or cervical LAD  Neurologic: AAOx3; CNII-XII grossly intact; no focal deficits  Psychiatric: affect and characteristics of appearance, verbalizations, behaviors are appropriate    LABS:                        12.3   7.60  )-----------( 247      ( 09 Jul 2024 05:30 )             36.6     07-09    139  |  103  |  13  ----------------------------<  98  3.6   |  23  |  0.67    Ca    9.3      09 Jul 2024 05:30  Phos  2.7     07-09  Mg     1.8     07-09    TPro  6.9  /  Alb  3.9  /  TBili  0.2  /  DBili  x   /  AST  14  /  ALT  23  /  AlkPhos  80  07-08        Urinalysis Basic - ( 09 Jul 2024 05:30 )    Color: x / Appearance: x / SG: x / pH: x  Gluc: 98 mg/dL / Ketone: x  / Bili: x / Urobili: x   Blood: x / Protein: x / Nitrite: x   Leuk Esterase: x / RBC: x / WBC x   Sq Epi: x / Non Sq Epi: x / Bacteria: x      CAPILLARY BLOOD GLUCOSE            Urinalysis Basic - ( 09 Jul 2024 05:30 )    Color: x / Appearance: x / SG: x / pH: x  Gluc: 98 mg/dL / Ketone: x  / Bili: x / Urobili: x   Blood: x / Protein: x / Nitrite: x   Leuk Esterase: x / RBC: x / WBC x   Sq Epi: x / Non Sq Epi: x / Bacteria: x        MEDICATIONS  (STANDING):  acetaminophen     Tablet .. 1000 milliGRAM(s) Oral every 8 hours  enoxaparin Injectable 40 milliGRAM(s) SubCutaneous every 24 hours  lamoTRIgine 100 milliGRAM(s) Oral every 12 hours  lidocaine   4% Patch 1 Patch Transdermal daily  melatonin 3 milliGRAM(s) Oral at bedtime  multivitamin 1 Tablet(s) Oral daily  pregabalin 50 milliGRAM(s) Oral every 8 hours  senna 2 Tablet(s) Oral at bedtime  tamsulosin 0.4 milliGRAM(s) Oral at bedtime  thiamine IVPB 500 milliGRAM(s) IV Intermittent daily    MEDICATIONS  (PRN):  ibuprofen  Tablet. 400 milliGRAM(s) Oral every 8 hours PRN Moderate Pain (4 - 6)  oxyCODONE    IR 5 milliGRAM(s) Oral every 8 hours PRN Severe Pain (7 - 10)      RADIOLOGY & ADDITIONAL TESTS: Reviewed

## 2024-07-10 NOTE — PROGRESS NOTE ADULT - SUBJECTIVE AND OBJECTIVE BOX
Neurology Progress Note    Interval History:    Patient was seen and examined, no acute event over night.     Medications:  acetaminophen     Tablet .. 1000 milliGRAM(s) Oral every 8 hours  enoxaparin Injectable 40 milliGRAM(s) SubCutaneous every 24 hours  ibuprofen  Tablet. 400 milliGRAM(s) Oral every 8 hours PRN  lamoTRIgine 100 milliGRAM(s) Oral every 12 hours  lidocaine   4% Patch 1 Patch Transdermal daily  melatonin 3 milliGRAM(s) Oral at bedtime  multivitamin 1 Tablet(s) Oral daily  oxyCODONE    IR 5 milliGRAM(s) Oral every 8 hours PRN  pregabalin 50 milliGRAM(s) Oral every 8 hours  senna 2 Tablet(s) Oral at bedtime  tamsulosin 0.4 milliGRAM(s) Oral at bedtime      Vital Signs Last 24 Hrs  T(C): 36.9 (10 Jul 2024 12:00), Max: 36.9 (09 Jul 2024 20:53)  T(F): 98.5 (10 Jul 2024 12:00), Max: 98.5 (10 Jul 2024 12:00)  HR: 104 (10 Jul 2024 12:00) (84 - 104)  BP: 108/68 (10 Jul 2024 12:00) (103/64 - 123/76)  BP(mean): 91 (09 Jul 2024 22:38) (77 - 91)  RR: 17 (10 Jul 2024 12:00) (17 - 18)  SpO2: 93% (10 Jul 2024 12:00) (93% - 95%)    Parameters below as of 10 Jul 2024 12:00  Patient On (Oxygen Delivery Method): room air      Neurological Examination:  General: Appearance is consistent with chronologic age.   Cognitive/Language: Awake, alert, and oriented to person, place, time and date. Fund of knowledge is appropriate. Naming, comprehension intact. Nondysarthric.  Cranial Nerves  - Eyes: EOMI w/o nystagmus, skew or reported double vision. PERRL. No ptosis/weakness of eyelid closure.  - Face: Facial sensation normal V1 - 3, no facial asymmetry.  - Ears/Nose/Throat: Palate elevates midline.  Tongue and uvula midline.  Motor exam: Normal tone and bulk. No tenderness, twitching, tremors or involuntary movements.            Upper extremity                  Bicep     Tricep     HG                                                 R      5/5        4/5          5/5                                                    L       5/5        4/5          5/5              Lower extremity                   HF        KE        DF         PF                                                  R     4/5       4/5       5/5       5/5                                               L      4/5      4/5       5/5        5/5    Sensory examination: Intact to light touch and pinprick, temperature and vibration in all extremities.  Reflexes: 2+ b/l biceps, triceps, and achilles. Patella areflexia. Plantar response downgoing b/l. Christina, clonus absent.  Cerebellum: FTN intact. HKS R abnormal.    Labs:  CBC Full  -  ( 09 Jul 2024 05:30 )  WBC Count : 7.60 K/uL  RBC Count : 3.87 M/uL  Hemoglobin : 12.3 g/dL  Hematocrit : 36.6 %  Platelet Count - Automated : 247 K/uL  Mean Cell Volume : 94.6 fl  Mean Cell Hemoglobin : 31.8 pg  Mean Cell Hemoglobin Concentration : 33.6 gm/dL  Auto Neutrophil # : 5.50 K/uL  Auto Lymphocyte # : 1.08 K/uL  Auto Monocyte # : 0.58 K/uL  Auto Eosinophil # : 0.36 K/uL  Auto Basophil # : 0.05 K/uL  Auto Neutrophil % : 72.4 %  Auto Lymphocyte % : 14.2 %  Auto Monocyte % : 7.6 %  Auto Eosinophil % : 4.7 %  Auto Basophil % : 0.7 %    07-09    139  |  103  |  13  ----------------------------<  98  3.6   |  23  |  0.67    Ca    9.3      09 Jul 2024 05:30  Phos  2.7     07-09  Mg     1.8     07-09    TPro  6.9  /  Alb  3.9  /  TBili  0.2  /  DBili  x   /  AST  14  /  ALT  23  /  AlkPhos  80  07-08    LIVER FUNCTIONS - ( 08 Jul 2024 14:55 )  Alb: 3.9 g/dL / Pro: 6.9 g/dL / ALK PHOS: 80 U/L / ALT: 23 U/L / AST: 14 U/L / GGT: x             Urinalysis Basic - ( 09 Jul 2024 05:30 )    Color: x / Appearance: x / SG: x / pH: x  Gluc: 98 mg/dL / Ketone: x  / Bili: x / Urobili: x   Blood: x / Protein: x / Nitrite: x   Leuk Esterase: x / RBC: x / WBC x   Sq Epi: x / Non Sq Epi: x / Bacteria: x      RADIOLOGY & ADDITIONAL TESTS: Reviewed   Neurology Progress Note    Interval History:    Patient was seen and examined, no acute events over night.     Medications:  acetaminophen     Tablet .. 1000 milliGRAM(s) Oral every 8 hours  enoxaparin Injectable 40 milliGRAM(s) SubCutaneous every 24 hours  ibuprofen  Tablet. 400 milliGRAM(s) Oral every 8 hours PRN  lamoTRIgine 100 milliGRAM(s) Oral every 12 hours  lidocaine   4% Patch 1 Patch Transdermal daily  melatonin 3 milliGRAM(s) Oral at bedtime  multivitamin 1 Tablet(s) Oral daily  oxyCODONE    IR 5 milliGRAM(s) Oral every 8 hours PRN  pregabalin 50 milliGRAM(s) Oral every 8 hours  senna 2 Tablet(s) Oral at bedtime  tamsulosin 0.4 milliGRAM(s) Oral at bedtime      Vital Signs Last 24 Hrs  T(C): 36.9 (10 Jul 2024 12:00), Max: 36.9 (09 Jul 2024 20:53)  T(F): 98.5 (10 Jul 2024 12:00), Max: 98.5 (10 Jul 2024 12:00)  HR: 104 (10 Jul 2024 12:00) (84 - 104)  BP: 108/68 (10 Jul 2024 12:00) (103/64 - 123/76)  BP(mean): 91 (09 Jul 2024 22:38) (77 - 91)  RR: 17 (10 Jul 2024 12:00) (17 - 18)  SpO2: 93% (10 Jul 2024 12:00) (93% - 95%)    Parameters below as of 10 Jul 2024 12:00  Patient On (Oxygen Delivery Method): room air      Neurological Examination:  General: Appearance is consistent with chronologic age.   Cognitive/Language: Awake, alert, and oriented to person, place, time and date. Fund of knowledge is appropriate. Naming, comprehension intact. Nondysarthric.  Cranial Nerves  - Eyes: EOMI w/o nystagmus, skew or reported double vision. PERRL. No ptosis/weakness of eyelid closure.  - Face: Facial sensation normal V1 - 3, no facial asymmetry.  - Ears/Nose/Throat: Palate elevates midline.  Tongue and uvula midline.  Motor exam: Normal tone and bulk. No tenderness, twitching, tremors or involuntary movements.            Upper extremity                  Bicep     Tricep     HG                                                 R      5/5        4/5          5/5                                                    L       5/5        4/5          5/5              Lower extremity                   HF        KE        DF         PF                                                  R     4/5       4/5       5/5       5/5                                               L      4/5      4/5       5/5        5/5    Sensory examination: Intact to light touch and pinprick, temperature and vibration in all extremities.  Reflexes: 2+ b/l biceps, triceps, and achilles. Patella areflexia. Plantar response downgoing b/l. Christina, clonus absent.  Cerebellum: FTN intact. HKS R abnormal.    Labs:  CBC Full  -  ( 09 Jul 2024 05:30 )  WBC Count : 7.60 K/uL  RBC Count : 3.87 M/uL  Hemoglobin : 12.3 g/dL  Hematocrit : 36.6 %  Platelet Count - Automated : 247 K/uL  Mean Cell Volume : 94.6 fl  Mean Cell Hemoglobin : 31.8 pg  Mean Cell Hemoglobin Concentration : 33.6 gm/dL  Auto Neutrophil # : 5.50 K/uL  Auto Lymphocyte # : 1.08 K/uL  Auto Monocyte # : 0.58 K/uL  Auto Eosinophil # : 0.36 K/uL  Auto Basophil # : 0.05 K/uL  Auto Neutrophil % : 72.4 %  Auto Lymphocyte % : 14.2 %  Auto Monocyte % : 7.6 %  Auto Eosinophil % : 4.7 %  Auto Basophil % : 0.7 %    07-09    139  |  103  |  13  ----------------------------<  98  3.6   |  23  |  0.67    Ca    9.3      09 Jul 2024 05:30  Phos  2.7     07-09  Mg     1.8     07-09    TPro  6.9  /  Alb  3.9  /  TBili  0.2  /  DBili  x   /  AST  14  /  ALT  23  /  AlkPhos  80  07-08    LIVER FUNCTIONS - ( 08 Jul 2024 14:55 )  Alb: 3.9 g/dL / Pro: 6.9 g/dL / ALK PHOS: 80 U/L / ALT: 23 U/L / AST: 14 U/L / GGT: x             Urinalysis Basic - ( 09 Jul 2024 05:30 )    Color: x / Appearance: x / SG: x / pH: x  Gluc: 98 mg/dL / Ketone: x  / Bili: x / Urobili: x   Blood: x / Protein: x / Nitrite: x   Leuk Esterase: x / RBC: x / WBC x   Sq Epi: x / Non Sq Epi: x / Bacteria: x      RADIOLOGY & ADDITIONAL TESTS: Reviewed

## 2024-07-10 NOTE — CHART NOTE - NSCHARTNOTEFT_GEN_A_CORE
MRI reviewed, negative for acute ischemia. Would continue to follow general neurology recommendations for prior history of cervical spinal stenosis and headache management. Stroke to sign off, rest of care per primary team.

## 2024-07-10 NOTE — DISCHARGE NOTE NURSING/CASE MANAGEMENT/SOCIAL WORK - PATIENT PORTAL LINK FT
You can access the FollowMyHealth Patient Portal offered by [facility] by registering at the following website: [email or web address]. By joining [facility]’s FollowMyHealth portal, you will also be able to view your health information using other applications (apps) compatible with our system.

## 2024-07-10 NOTE — PROGRESS NOTE ADULT - ASSESSMENT
72 y/o M with PMH of seizures on lamotrigine, osteoporosis on alendronate presents with weakness and inability to care for self at home. Pt was recently admitted to [facility] for enterocolitis for one week, given flagyl and ceftriaxone, and discharged on 7/3/24. Admitted to [facility] for generalized weakness and possible PNA. Stroke code called overnight for transient facial droop. CT/CTA/CTP unremarkable. Stroke team following, recommended MR Brain. Neurology consulted for refractory headache with lightheadedness. Headache started a couple of months ago, dull pain around and behind his both eyes, 10/10, constantly there, not going away but not getting worse. Primary team tried multiple doses of Tylenol, Toradol, Dilaudid without improvement of headache. Physical exam RLE ataxia, LE weakness. No sinus tenderness but b/l temporal tenderness noted. Temporal arteritis less likely given normal ESR/CRP. MRI negative for acute infarct, hemorrhage, mass.    Recommendations  - Can try migraine cocktail therapy with Toradol 30mg IV, Reglan 10mg IV, Depakote 500mg IV and Dexamethasone 8mg IV  - Start with Depakote and Dexamethasone, add Toradol and Reglan if the initial therapy is not sufficient    Case discussed with Dr. Vail. 72 y/o M with PMH of seizures on lamotrigine, osteoporosis on alendronate presents with weakness and inability to care for self at home. Pt was recently admitted to [facility] side for enterocolitis for one week, given flagyl and ceftriaxone, and discharged on 7/3/24. Admitted to [facility] for generalized weakness and possible PNA. Stroke code called overnight for transient facial droop. CT/CTA/CTP unremarkable. Stroke team following, recommended MR Brain. Neurology consulted for refractory headache with lightheadedness. Headache started a couple of months ago, dull pain around and behind his both eyes, 10/10, constantly there, not going away but not getting worse. Primary team tried multiple doses of Tylenol, Toradol, Dilaudid without improvement of headache. Physical exam RLE ataxia, LE weakness. No sinus tenderness but b/l temporal tenderness noted. Temporal arteritis less likely given normal ESR/CRP. MRI negative for acute infarct, hemorrhage, mass.    Recommendations  - Can try migraine cocktail therapy with Toradol 30mg IV, Reglan 10mg IV, Depakote 500mg IV and Dexamethasone 8mg IV  - Start with Depakote and Dexamethasone, add Toradol and Reglan if the initial therapy is not sufficient  - No further neurological workup    Case discussed with Dr. Vail.

## 2024-07-10 NOTE — PROGRESS NOTE ADULT - SUBJECTIVE AND OBJECTIVE BOX
Physical Medicine and Rehabilitation Progress Note :       Patient is a 73y old  Male who presents with a chief complaint of inability to care for self (10 Jul 2024 08:21)      HPI:  Patient is a 72 y/o M with PMH of seizures on lamotrigine, osteoporosis on alendronate  presents to ED with weakness and inability to care for self at home. Pt states he was recently admitted to [facility] side for one week and discharged on 7/3/24. Patient was found to have enterocolitis and given flagyl and ceftriaxone. Patient reported going to [facility] on 7/4 due to worsening symptoms. Patient was discharged same day on levaquin and cefpodoxime for concerns of PNA and took a few doses.  Pt since then has had difficult time at home caring for self including getting food.  Pt reported having difficult time walking secondary to generalized weakness.  Patient reported his symptoms initially started about  2-3 months ago and has been getting worse since. He's had watery diarrhea for over a week and  reported last bowel movement to be on 7/5, has not has one since then due to having poor po intake. Patient reported having a subjective fever, body aches, malaise, headaches, and generalized weakness. with difficulty standing and walking. He denied N,V, abdominal pain, or melena. He reported having urinary hesitancy but no dysuria or hematuria.    Per chart review, patient was admitted to [facility] on 6/26/24 with watery diarrhea and abdominal pain, found to be tachycardic with leukocytosis (WBC 20.4, neutrophil predominant) 2/2 sepsis i/s/o enterocolitis. Treated with CTX and flagyl with resolution of diarrhea, stool studies never sent.     In the ED:  Vital Signs: T 98.7 F, , /72, RR 17, SpO2 96% on room air  Labs: within normal limits  EKG: NSR.   CXR: RLL field consolidation  Interventions: 1L NS bolus, CTX 1g, azithromycin 500mg  Consults: PT (07 Jul 2024 15:57)                            12.3   7.60  )-----------( 247      ( 09 Jul 2024 05:30 )             36.6       07-09    139  |  103  |  13  ----------------------------<  98  3.6   |  23  |  0.67    Ca    9.3      09 Jul 2024 05:30  Phos  2.7     07-09  Mg     1.8     07-09    TPro  6.9  /  Alb  3.9  /  TBili  0.2  /  DBili  x   /  AST  14  /  ALT  23  /  AlkPhos  80  07-08    Vital Signs Last 24 Hrs  T(C): 36.9 (10 Jul 2024 12:00), Max: 36.9 (09 Jul 2024 20:53)  T(F): 98.5 (10 Jul 2024 12:00), Max: 98.5 (10 Jul 2024 12:00)  HR: 104 (10 Jul 2024 12:00) (84 - 104)  BP: 108/68 (10 Jul 2024 12:00) (103/64 - 123/76)  BP(mean): 91 (09 Jul 2024 22:38) (77 - 91)  RR: 17 (10 Jul 2024 12:00) (17 - 18)  SpO2: 93% (10 Jul 2024 12:00) (93% - 95%)    Parameters below as of 10 Jul 2024 12:00  Patient On (Oxygen Delivery Method): room air        MEDICATIONS  (STANDING):  acetaminophen     Tablet .. 1000 milliGRAM(s) Oral every 8 hours  enoxaparin Injectable 40 milliGRAM(s) SubCutaneous every 24 hours  lamoTRIgine 100 milliGRAM(s) Oral every 12 hours  lidocaine   4% Patch 1 Patch Transdermal daily  melatonin 3 milliGRAM(s) Oral at bedtime  multivitamin 1 Tablet(s) Oral daily  pregabalin 50 milliGRAM(s) Oral every 8 hours  senna 2 Tablet(s) Oral at bedtime  tamsulosin 0.4 milliGRAM(s) Oral at bedtime    MEDICATIONS  (PRN):  ibuprofen  Tablet. 400 milliGRAM(s) Oral every 8 hours PRN Moderate Pain (4 - 6)  oxyCODONE    IR 5 milliGRAM(s) Oral every 8 hours PRN Severe Pain (7 - 10)      T(C): 36.9 (07-10-24 @ 12:00), Max: 36.9 (07-09-24 @ 20:53)  HR: 104 (07-10-24 @ 12:00) (84 - 104)  BP: 108/68 (07-10-24 @ 12:00) (103/64 - 123/76)  RR: 17 (07-10-24 @ 12:00) (17 - 18)  SpO2: 93% (07-10-24 @ 12:00) (93% - 95%)        Physical Exam:     frail 73 y o man lying comfortably in semi Case's position , awake , alert , no acute complaints , feels tired     Head: normocephalic , atraumatic    Eyes: PERRLA , EOMI , no nystagmus , sclera anicteric    ENT / FACE: neg nasal discharge , uvula midline , no oropharyngeal erythema / exudate    Neck: supple , negative JVD , negative carotid bruits , no thyromegaly    Chest: CTA bilaterally , neg wheeze / rhonchi / rales / crackles / egophany    Cardiovascular: regular rate and rhythm , neg murmurs / rubs / gallops    Abdomen: soft , non distended , no tenderness to palpation in all 4 quadrants ,  normal bowel sounds     Extremities: WWP , neg cyanosis /clubbing / edema     Neurologic Exam:     Alert and oriented to person , place , date/year , speech fluent w/o dysarthria     Cranial Nerves:           II:                         pupils equal , round and reactive to light , visual fields intact         III/ IV/VI:             extraocular movements intact , neg nystagmus , neg ptosis        V:                        facial sensation intact , V1-3 normal        VII:                      face symmetric , no droop , normal eye closure and smile        VIII:                     hearing intact to finger rub bilaterally        IX and X:             no hoarseness , gag intact , palate/ uvula rise symmetrically        XI:                       SCM / trapezius strength intact bilateral        XII:                      no tongue deviation    Motor Exam:        > 3+/5 x 4 extremities , without drift       Sensation:         intact to light touch x 4 extremities                            no neglect or extinction on double simultaneous testing    DTR:           biceps/brachioradialis: equal                            patella/ankle: equal          neg Babinski          Coordination:            Finger to Nose:  neg dysmetria bilaterally        Functional Status Assessment :   7/9/2024       Pain Assessment/Number Scale (0-10) Adult  Presence of Pain: denies pain/discomfort (Rating = 0)  Pain Rating (0-10): Rest: 0 (no pain/absence of nonverbal indicators of pain)  Pain Rating (0-10): Activity: 0 (no pain/absence of nonverbal indicators of pain)    Safety      AM-PAC Functional Assessment: Basic Mobility  Type of Assessment: Daily assessment  Turning from your back to your side while in a flat bed without using bedrails?: 3 = A little assistance  Moving from lying on your back to sitting on the flat side of a flat bed without using bedrails?: 3 = A little assistance  Moving to and from a bed to a chair (including a wheelchair)?: 3 = A little assistance  Standing up from a chair using your arms (e.g. wheelchair or bedside chair)?: 3 = A little assistance  Walking in hospital room?: 3 = A little assistance  Climbing 3-5 steps with a railing?: 3 = A little assistance  Score: 18   Row Comment: Ask the patient "How much help from another person do you currently need? (If the patient hasn't done an activity recently, how much help from another person do you think he/she needs if he/she tried?)    Cognitive/Neuro      Cognitive/Neuro/Behavioral  Cognitive/Neuro/Behavioral [WDL Definition: Alert; opens eyes spontaneously; arouses to voice or touch; oriented x 4; follows commands; speech spontaneous, logical; purposeful motor response; behavior appropriate to situation]: WDL    Language Assistance  Preferred Language to Address Healthcare Preferred Language to Address Healthcare: English    Therapeutic Interventions      Bed Mobility  Bed Mobility Training Rolling/Turning: contact guard;  1 person assist;  nonverbal cues (demo/gestures);  verbal cues;  bed rails  Bed Mobility Training Sit-to-Supine: contact guard;  1 person assist;  nonverbal cues (demo/gestures);  verbal cues;  bed rails  Bed Mobility Training Supine-to-Sit: contact guard;  1 person assist;  verbal cues;  nonverbal cues (demo/gestures);  bed rails  Bed Mobility Training Limitations: decreased ability to use arms for pushing/pulling;  impaired ability to control trunk for mobility;  decreased ability to use legs for bridging/pushing;  decreased strength;  impaired balance;  impaired postural control    Sit-Stand Transfer Training  Transfer Training Sit-to-Stand Transfer: minimum assist (75% patient effort);  1 person assist;  nonverbal cues (demo/gestures);  verbal cues;  straight cane  Transfer Training Stand-to-Sit Transfer: minimum assist (75% patient effort);  1 person assist;  nonverbal cues (demo/gestures);  verbal cues;  straight cane  Sit-to-Stand Transfer Training Transfer Safety Analysis: decreased balance;  decreased step length;  decreased weight-shifting ability;  decreased strength;  impaired balance;  impaired postural control;  straight cane    Gait Training  Gait Training: minimum assist (75% patient effort);  1 person assist;  nonverbal cues (demo/gestures);  verbal cues;  straight cane;  50 feet  Gait Analysis: 3-point gait   decreased joyce;  crouch;  increased time in double stance;  decreased hip/knee flexion;  shuffling;  decreased step length;  decreased trunk rotation;  decreased weight-shifting ability;  decreased strength;  impaired balance;  impaired postural control;  50 feet;  straight cane    Therapeutic Exercise  Therapeutic Exercise Detail: BLE sitting on EOB x10: LAQ, ankle pumps, marching           PM&R Impression : as above      Current disposition plan recommendation :    subacute rehab placement

## 2024-07-11 LAB — LAMOTRIGINE SERPL-MCNC: 3.9 UG/ML — SIGNIFICANT CHANGE UP (ref 2–20)

## 2024-07-13 LAB
CULTURE RESULTS: SIGNIFICANT CHANGE UP
CULTURE RESULTS: SIGNIFICANT CHANGE UP
SPECIMEN SOURCE: SIGNIFICANT CHANGE UP
SPECIMEN SOURCE: SIGNIFICANT CHANGE UP

## 2024-07-15 LAB — METHYLMALONATE SERPL-SCNC: 129 NMOL/L — SIGNIFICANT CHANGE UP (ref 0–378)

## 2024-07-16 ENCOUNTER — TRANSCRIPTION ENCOUNTER (OUTPATIENT)
Age: 74
End: 2024-07-16

## 2024-07-17 RX ORDER — ACETAMINOPHEN 325 MG
2 TABLET ORAL
Refills: 0 | DISCHARGE

## 2024-07-17 RX ORDER — MIRABEGRON 50 MG/1
1 TABLET, EXTENDED RELEASE ORAL
Refills: 0 | DISCHARGE

## 2024-07-17 RX ORDER — MELOXICAM 7.5 MG/1
1 TABLET ORAL
Refills: 0 | DISCHARGE

## 2024-07-17 RX ORDER — ALENDRONATE SODIUM 35 MG/1
1 TABLET ORAL
Refills: 0 | DISCHARGE

## 2024-07-17 RX ORDER — CALCIUM CARBONATE 500(1250)
2 TABLET,CHEWABLE ORAL
Refills: 0 | DISCHARGE

## 2024-07-17 RX ORDER — TAMSULOSIN HYDROCHLORIDE 0.4 MG/1
2 CAPSULE ORAL
Refills: 0 | DISCHARGE

## 2024-07-17 RX ORDER — LAMOTRIGINE 100 MG/1
1 TABLET ORAL
Refills: 0 | DISCHARGE

## 2024-07-17 RX ORDER — TERIPARATIDE 250 UG/ML
20 INJECTION, SOLUTION SUBCUTANEOUS
Refills: 0 | DISCHARGE

## 2024-07-17 RX ORDER — GABAPENTIN
1 POWDER (GRAM) MISCELLANEOUS
Refills: 0 | DISCHARGE

## 2024-07-19 DIAGNOSIS — Z41.8 ENCOUNTER FOR OTHER PROCEDURES FOR PURPOSES OTHER THAN REMEDYING HEALTH STATE: ICD-10-CM

## 2024-07-19 DIAGNOSIS — G44.89 OTHER HEADACHE SYNDROME: ICD-10-CM

## 2024-07-19 DIAGNOSIS — G40.909 EPILEPSY, UNSPECIFIED, NOT INTRACTABLE, WITHOUT STATUS EPILEPTICUS: ICD-10-CM

## 2024-07-19 DIAGNOSIS — K59.00 CONSTIPATION, UNSPECIFIED: ICD-10-CM

## 2024-07-19 DIAGNOSIS — G47.00 INSOMNIA, UNSPECIFIED: ICD-10-CM

## 2024-07-19 DIAGNOSIS — R53.81 OTHER MALAISE: ICD-10-CM

## 2024-07-19 DIAGNOSIS — R62.7 ADULT FAILURE TO THRIVE: ICD-10-CM

## 2024-07-19 DIAGNOSIS — R64 CACHEXIA: ICD-10-CM

## 2024-07-19 DIAGNOSIS — G89.29 OTHER CHRONIC PAIN: ICD-10-CM

## 2024-07-19 DIAGNOSIS — E43 UNSPECIFIED SEVERE PROTEIN-CALORIE MALNUTRITION: ICD-10-CM

## 2024-07-19 DIAGNOSIS — R53.1 WEAKNESS: ICD-10-CM

## 2024-07-19 DIAGNOSIS — Z87.891 PERSONAL HISTORY OF NICOTINE DEPENDENCE: ICD-10-CM

## 2024-07-19 DIAGNOSIS — N40.1 BENIGN PROSTATIC HYPERPLASIA WITH LOWER URINARY TRACT SYMPTOMS: ICD-10-CM

## 2024-07-19 DIAGNOSIS — F10.90 ALCOHOL USE, UNSPECIFIED, UNCOMPLICATED: ICD-10-CM

## 2024-07-19 DIAGNOSIS — N17.9 ACUTE KIDNEY FAILURE, UNSPECIFIED: ICD-10-CM

## 2024-07-19 DIAGNOSIS — M54.9 DORSALGIA, UNSPECIFIED: ICD-10-CM

## 2024-07-19 DIAGNOSIS — Z59.41 FOOD INSECURITY: ICD-10-CM

## 2024-07-19 DIAGNOSIS — M81.0 AGE-RELATED OSTEOPOROSIS WITHOUT CURRENT PATHOLOGICAL FRACTURE: ICD-10-CM

## 2024-07-19 DIAGNOSIS — Z86.718 PERSONAL HISTORY OF OTHER VENOUS THROMBOSIS AND EMBOLISM: ICD-10-CM

## 2024-07-19 DIAGNOSIS — R39.11 HESITANCY OF MICTURITION: ICD-10-CM

## 2024-07-19 DIAGNOSIS — R26.81 UNSTEADINESS ON FEET: ICD-10-CM

## 2024-07-19 SDOH — ECONOMIC STABILITY - FOOD INSECURITY: FOOD INSECURITY: Z59.41

## 2024-07-30 ENCOUNTER — TRANSCRIPTION ENCOUNTER (OUTPATIENT)
Age: 74
End: 2024-07-30

## 2024-08-02 ENCOUNTER — TRANSCRIPTION ENCOUNTER (OUTPATIENT)
Age: 74
End: 2024-08-02

## 2024-08-10 ENCOUNTER — EMERGENCY (EMERGENCY)
Facility: HOSPITAL | Age: 74
LOS: 1 days | Discharge: HOME | End: 2024-08-10
Attending: STUDENT IN AN ORGANIZED HEALTH CARE EDUCATION/TRAINING PROGRAM | Admitting: STUDENT IN AN ORGANIZED HEALTH CARE EDUCATION/TRAINING PROGRAM
Payer: MEDICARE

## 2024-08-10 VITALS
DIASTOLIC BLOOD PRESSURE: 76 MMHG | SYSTOLIC BLOOD PRESSURE: 116 MMHG | HEART RATE: 101 BPM | TEMPERATURE: 98 F | RESPIRATION RATE: 18 BRPM | OXYGEN SATURATION: 94 % | WEIGHT: 130.07 LBS | HEIGHT: 69 IN

## 2024-08-10 VITALS
HEART RATE: 86 BPM | OXYGEN SATURATION: 94 % | RESPIRATION RATE: 16 BRPM | DIASTOLIC BLOOD PRESSURE: 74 MMHG | SYSTOLIC BLOOD PRESSURE: 127 MMHG | TEMPERATURE: 98 F

## 2024-08-10 DIAGNOSIS — Z98.890 OTHER SPECIFIED POSTPROCEDURAL STATES: Chronic | ICD-10-CM

## 2024-08-10 DIAGNOSIS — R19.7 DIARRHEA, UNSPECIFIED: ICD-10-CM

## 2024-08-10 PROBLEM — R56.9 UNSPECIFIED CONVULSIONS: Chronic | Status: ACTIVE | Noted: 2024-07-07

## 2024-08-10 PROBLEM — M81.0 AGE-RELATED OSTEOPOROSIS WITHOUT CURRENT PATHOLOGICAL FRACTURE: Chronic | Status: ACTIVE | Noted: 2024-07-07

## 2024-08-10 LAB
ALBUMIN SERPL ELPH-MCNC: 4.7 G/DL — SIGNIFICANT CHANGE UP (ref 3.3–5)
ALP SERPL-CCNC: 100 U/L — SIGNIFICANT CHANGE UP (ref 40–120)
ALT FLD-CCNC: 11 U/L — SIGNIFICANT CHANGE UP (ref 10–45)
ANION GAP SERPL CALC-SCNC: 10 MMOL/L — SIGNIFICANT CHANGE UP (ref 5–17)
AST SERPL-CCNC: 15 U/L — SIGNIFICANT CHANGE UP (ref 10–40)
BASOPHILS # BLD AUTO: 0.08 K/UL — SIGNIFICANT CHANGE UP (ref 0–0.2)
BASOPHILS NFR BLD AUTO: 0.9 % — SIGNIFICANT CHANGE UP (ref 0–2)
BILIRUB DIRECT SERPL-MCNC: <0.2 MG/DL — SIGNIFICANT CHANGE UP (ref 0–0.3)
BILIRUB INDIRECT FLD-MCNC: SIGNIFICANT CHANGE UP (ref 0.2–1)
BILIRUB SERPL-MCNC: 0.4 MG/DL — SIGNIFICANT CHANGE UP (ref 0.2–1.2)
BUN SERPL-MCNC: 16 MG/DL — SIGNIFICANT CHANGE UP (ref 7–23)
C DIFF GDH STL QL: NEGATIVE — SIGNIFICANT CHANGE UP
C DIFF GDH STL QL: SIGNIFICANT CHANGE UP
CALCIUM SERPL-MCNC: 10.1 MG/DL — SIGNIFICANT CHANGE UP (ref 8.4–10.5)
CHLORIDE SERPL-SCNC: 103 MMOL/L — SIGNIFICANT CHANGE UP (ref 96–108)
CO2 SERPL-SCNC: 29 MMOL/L — SIGNIFICANT CHANGE UP (ref 22–31)
CREAT SERPL-MCNC: 0.76 MG/DL — SIGNIFICANT CHANGE UP (ref 0.5–1.3)
EGFR: 96 ML/MIN/1.73M2 — SIGNIFICANT CHANGE UP
EOSINOPHIL # BLD AUTO: 0.17 K/UL — SIGNIFICANT CHANGE UP (ref 0–0.5)
EOSINOPHIL NFR BLD AUTO: 1.9 % — SIGNIFICANT CHANGE UP (ref 0–6)
GI PCR PANEL: SIGNIFICANT CHANGE UP
GLUCOSE SERPL-MCNC: 102 MG/DL — HIGH (ref 70–99)
HCT VFR BLD CALC: 41.8 % — SIGNIFICANT CHANGE UP (ref 39–50)
HGB BLD-MCNC: 13.6 G/DL — SIGNIFICANT CHANGE UP (ref 13–17)
IMM GRANULOCYTES NFR BLD AUTO: 0.4 % — SIGNIFICANT CHANGE UP (ref 0–0.9)
LIDOCAIN IGE QN: 17 U/L — SIGNIFICANT CHANGE UP (ref 7–60)
LYMPHOCYTES # BLD AUTO: 1.96 K/UL — SIGNIFICANT CHANGE UP (ref 1–3.3)
LYMPHOCYTES # BLD AUTO: 22 % — SIGNIFICANT CHANGE UP (ref 13–44)
MCHC RBC-ENTMCNC: 31.5 PG — SIGNIFICANT CHANGE UP (ref 27–34)
MCHC RBC-ENTMCNC: 32.5 GM/DL — SIGNIFICANT CHANGE UP (ref 32–36)
MCV RBC AUTO: 96.8 FL — SIGNIFICANT CHANGE UP (ref 80–100)
MONOCYTES # BLD AUTO: 0.85 K/UL — SIGNIFICANT CHANGE UP (ref 0–0.9)
MONOCYTES NFR BLD AUTO: 9.6 % — SIGNIFICANT CHANGE UP (ref 2–14)
NEUTROPHILS # BLD AUTO: 5.8 K/UL — SIGNIFICANT CHANGE UP (ref 1.8–7.4)
NEUTROPHILS NFR BLD AUTO: 65.2 % — SIGNIFICANT CHANGE UP (ref 43–77)
NRBC # BLD: 0 /100 WBCS — SIGNIFICANT CHANGE UP (ref 0–0)
PLATELET # BLD AUTO: 242 K/UL — SIGNIFICANT CHANGE UP (ref 150–400)
POTASSIUM SERPL-MCNC: 4.4 MMOL/L — SIGNIFICANT CHANGE UP (ref 3.5–5.3)
POTASSIUM SERPL-SCNC: 4.4 MMOL/L — SIGNIFICANT CHANGE UP (ref 3.5–5.3)
PROT SERPL-MCNC: 8 G/DL — SIGNIFICANT CHANGE UP (ref 6–8.3)
RBC # BLD: 4.32 M/UL — SIGNIFICANT CHANGE UP (ref 4.2–5.8)
RBC # FLD: 13.8 % — SIGNIFICANT CHANGE UP (ref 10.3–14.5)
SODIUM SERPL-SCNC: 142 MMOL/L — SIGNIFICANT CHANGE UP (ref 135–145)
WBC # BLD: 8.9 K/UL — SIGNIFICANT CHANGE UP (ref 3.8–10.5)
WBC # FLD AUTO: 8.9 K/UL — SIGNIFICANT CHANGE UP (ref 3.8–10.5)

## 2024-08-10 PROCEDURE — 99284 EMERGENCY DEPT VISIT MOD MDM: CPT

## 2024-08-10 PROCEDURE — 87507 IADNA-DNA/RNA PROBE TQ 12-25: CPT

## 2024-08-10 PROCEDURE — 87324 CLOSTRIDIUM AG IA: CPT

## 2024-08-10 PROCEDURE — 80076 HEPATIC FUNCTION PANEL: CPT

## 2024-08-10 PROCEDURE — 85025 COMPLETE CBC W/AUTO DIFF WBC: CPT

## 2024-08-10 PROCEDURE — 87449 NOS EACH ORGANISM AG IA: CPT

## 2024-08-10 PROCEDURE — 83690 ASSAY OF LIPASE: CPT

## 2024-08-10 PROCEDURE — 80048 BASIC METABOLIC PNL TOTAL CA: CPT

## 2024-08-10 PROCEDURE — 36415 COLL VENOUS BLD VENIPUNCTURE: CPT

## 2024-08-10 RX ORDER — BACTERIOSTATIC SODIUM CHLORIDE 0.9 %
1000 VIAL (ML) INJECTION ONCE
Refills: 0 | Status: COMPLETED | OUTPATIENT
Start: 2024-08-10 | End: 2024-08-10

## 2024-08-10 RX ADMIN — Medication 1000 MILLILITER(S): at 12:40

## 2024-08-10 RX ADMIN — Medication 1000 MILLILITER(S): at 12:04

## 2024-08-10 NOTE — CHART NOTE - NSCHARTNOTEFT_GEN_A_CORE
Patient chart reviewed and appreciated.  Patient seen in ED on marginal date.  Patient met with writer as per patient request.  Of note, patient was seen in ED , discharged and returned to request SW intervention.  On interview, patient reported wanting to be referred to sub acute rehab.  Patient was informed that he would not be referred to as sub acute rehab as he was able to ambulate.      Collateral contact obtained from Abdirizak Triana CM from patient's residence [phone].  Mr. Triana reported that the patient resides in an independent living housing.  He stated that the patient is able to care for himself in the community.  Patient was recently discharged from [facility].  At time of DC, the patient was referred to [facility] (is currently waiting for start of care).  Patient has also been referred to [facility] and [facility].  He is awaiting for services to begin.  Mr. Triana notes that the patient is [facility] connected and is followed by a PCP.  Patient notes that his next [facility] appointment is scheduled for October.  Writer encouraged patient to contact the [facility] and request that he be put on a cancellation list.  patient agreed with plan.     Patient will return to his private residence with follow up as noted.  DC transportation was arranged for patient.  Patient was also provided with a boxed food.   Patient discharged without incident.

## 2024-08-10 NOTE — ED PROVIDER NOTE - NSFOLLOWUPCLINICS_GEN_ALL_ED_FT
[facility]  Family Medicine  [address]  [place], [place] [address]  Phone: [phone]  Fax:

## 2024-08-10 NOTE — ED ADULT TRIAGE NOTE - WEIGHT IN KG
.  [facility]/pharmacy #[id number] - [place] - [address] [facility]  [address] [facility] [address]    Phone: [phone]   · Erika Rosario MD  Internal Medicine  [facility]  [phone]           59

## 2024-08-10 NOTE — ED ADULT TRIAGE NOTE - CHIEF COMPLAINT QUOTE
BIBEMS, complaining of diarrhea, weakness and lightheaded for the past few months. Patient states that he had incontinence of stool yesterday in his sleep.

## 2024-08-10 NOTE — ED PROVIDER NOTE - CLINICAL SUMMARY MEDICAL DECISION MAKING FREE TEXT BOX
pt c/o diarrhea x few wks w/o any acute changes today, non bloody, no Cdif risk factors, eating and drinking well, clinically well appearing, labs wnl, stool studies sent, no indication for any imaging, stable for dc, BRAT diet advised, to f/u w/pmd or gi, strict return precautions given

## 2024-08-10 NOTE — ED ADULT NURSE NOTE - CAS ELECT INFOMATION PROVIDED
DC instructions D/C instructions provided to pt and advise to f/u with PCP. A&Ox3 ambulatory with rollator/DC instructions

## 2024-08-10 NOTE — ED ADULT NURSE NOTE - NSFALLRISKASMTTYPE_ED_ALL_ED
COPD/PN Week 3 Survey      Responses   [facility] patient discharged from?  [place]   Does the patient have one of the following disease processes/diagnoses(primary or secondary)?  COPD/Pneumonia   Was the primary reason for admission:  Pneumonia   Week 3 attempt successful?  Yes   Call start time  1705   Call end time  1708   Discharge diagnosis  Pneumonia due to infectious organism    Meds reviewed with patient/caregiver?  Yes   Is the patient having any side effects they believe may be caused by any medication additions or changes?  No   Does the patient have all medications ordered at discharge?  Yes   Is the patient taking all medications as directed (includes completed medication regime)?  Yes   Does the patient have a primary care provider?   Yes   Does the patient have an appointment with their PCP or specialist within 7 days of discharge?  Yes   Has the patient kept scheduled appointments due by today?  Yes   Has home health visited the patient within 72 hours of discharge?  N/A   Pulse Ox monitoring  None   Psychosocial issues?  No   Did the patient receive a copy of their discharge instructions?  Yes   Nursing interventions  Reviewed instructions with patient   What is the patient's perception of their health status since discharge?  Improving   Nursing Interventions  Nurse provided patient education   Are the patient's immunizations up to date?   Yes   Nursing interventions  Educated on importance of maintaining up to date immunizations as advised by provider   If the patient is a current smoker, are they able to teach back resources for cessation?  Not a smoker, Smoking cessation medications   Is the patient/caregiver able to teach back the hierarchy of who to call/visit for symptoms/problems? PCP, Specialist, Home health nurse, Urgent Care, ED, 911  Yes   Is the patient/caregiver able to teach back signs and symptoms of worsening condition:  Fever/chills, Shortness of breath, Chest pain   Is the  patient/caregiver able to teach back importance of completing antibiotic course of treatment?  Yes   Week 3 call completed?  Yes          Franki Stallings RN   Initial (On Arrival)

## 2024-08-10 NOTE — ED PROVIDER NOTE - NSFOLLOWUPINSTRUCTIONS_ED_ALL_ED_FT
Diarrhea, Adult  eat bread, rice, cereal, toast, bananas, apple sauce, etc.  Diarrhea is when you pass loose and sometimes watery poop (stool) often. Diarrhea can make you feel weak and cause you to lose water in your body (get dehydrated). Losing water in your body can cause you to:  Feel tired and thirsty.  Have a dry mouth.  Go pee (urinate) less often.  Diarrhea often lasts 2–3 days. It can last longer if it is a sign of something more serious. Be sure to treat your diarrhea as told by your doctor.  Follow these instructions at home:  Eating and drinking  A bottle of clear juice and a glass of water.  Bread, rice, and cereal from the grain group.   Follow these instructions as told by your doctor:  Take an ORS (oral rehydration solution). This is a drink that helps you replace fluids and minerals your body lost. It is sold at pharmacies and stores.  Drink enough fluid to keep your pee (urine) pale yellow.  Drink fluids such as:  Water. You can also get fluids by sucking on ice chips.  Diluted fruit juice.  Low-calorie sports drinks.  Milk.  Avoid drinking fluids that have a lot of sugar or caffeine in them. These include soda, energy drinks, and regular sports drinks.  Avoid alcohol.  Eat bland, easy-to-digest foods in small amounts as you are able. These foods include:  Bananas.  Applesauce.  Rice.  Low-fat (lean) meats.  Toast.  Crackers.  Avoid spicy or fatty foods.  Medicines  Take over-the-counter and prescription medicines only as told by your doctor.  If you were prescribed antibiotics, take them as told by your doctor. Do not stop taking them even if you start to feel better.  General instructions  Washing hands with soap and water.  Wash your hands often using soap and water for 20 seconds. If soap and water are not available, use hand . Others in your home should wash their hands as well. Wash your hands:  After using the toilet or changing a diaper.  Before preparing, cooking, or serving food.  While caring for a sick person.  While visiting someone in a hospital.  Rest at home while you get better.  Take a warm bath to help with any burning or pain from having diarrhea.  Watch your condition for any changes.  Contact a doctor if:  You have a fever.  Your diarrhea gets worse.  You have new symptoms.  You vomit every time you eat or drink.  You feel light-headed, dizzy, or you have a headache.  You have muscle cramps.  You have signs of losing too much water in your body, such as:  Dark pee, very little pee, or no pee.  Cracked lips.  Dry mouth.  Sunken eyes.  Sleepiness.  Weakness.  You have bloody or black poop or poop that looks like tar.  You have very bad pain, cramping, or bloating in your belly (abdomen).  Your skin feels cold and clammy.  You feel confused.  Get help right away if:  You have chest pain.  Your heart is beating very quickly.  You have trouble breathing or you are breathing very quickly.  You feel very weak or you faint.  These symptoms may be an emergency. Get help right away. Call 911.  Do not wait to see if the symptoms will go away.  Do not drive yourself to the hospital.

## 2024-08-10 NOTE — ED PROVIDER NOTE - HISTORY ATTESTATION, MLM
Situation: Follow-up call for [facility] Program.    Key Assessments: Remainder of General Assessment and SDOH questions completed with Leon. Reports that therapy is going well. In addition to PT he has been wheeling himself around the Rehab facility in his wheelchair, both to pass the time and to build up his arm strength.     Actions Taken: Encouragement provided.    Plan: Follow-up with patient in 1 week to assist in Goal setting.    See hyperlinks within encounter for full documentation.   I have reviewed and confirmed nurses' notes...

## 2024-08-10 NOTE — ED ADULT NURSE NOTE - OBJECTIVE STATEMENT
Pt A&Ox3 ambulatory with rollator, pt complaints of diarrhea x months, headache and lightheaded when getting up and walking, weakness and poor appetite. [facility] tested his st[facility]. Endorses liquid stools unknown blood/color as per pt he is color blind. Pt came to ed after having an accident last night while sleeping and night sweats, concerned diarrhea is getting worst. Denies fever/chills, abdominal pain, nausea.

## 2024-08-10 NOTE — ED PROVIDER NOTE - PATIENT PORTAL LINK FT
You can access the FollowMyHealth Patient Portal offered by [facility] by registering at the following website: [email or web address]. By joining [facility]’s FollowMyHealth portal, you will also be able to view your health information using other applications (apps) compatible with our system.

## 2024-08-10 NOTE — ED PROVIDER NOTE - OBJECTIVE STATEMENT
The pt is a 72 y/o M, [facility] sz, who presents to ED c/o diarrhea x few wks. Pt states watery, non bloody diarrhea, 5-6 episodes / day, no acute changes in symptoms today. No recent abx use, no recent travel. Denies abd pain, n/v, fevers, chills, cp, sob, dizziness, syncope.

## 2025-01-22 NOTE — ED ADULT TRIAGE NOTE - ESI TRIAGE ACUITY LEVEL, MLM
Strep test is positive.  Give amoxicillin twice a day.  Give Tylenol or Motrin as needed for fever or pain.  Make sure he is drinking plenty of fluids.  Switch his toothbrush out in 3 days.  Recheck with your pediatrician if no improvement  He is considered contagious for 24 hours once he starts the amoxicillin  
3